# Patient Record
Sex: FEMALE | Race: WHITE | NOT HISPANIC OR LATINO | Employment: FULL TIME | URBAN - METROPOLITAN AREA
[De-identification: names, ages, dates, MRNs, and addresses within clinical notes are randomized per-mention and may not be internally consistent; named-entity substitution may affect disease eponyms.]

---

## 2017-01-27 ENCOUNTER — TELEPHONE (OUTPATIENT)
Dept: ORTHOPEDICS | Facility: CLINIC | Age: 52
End: 2017-01-27

## 2017-01-27 NOTE — TELEPHONE ENCOUNTER
Received faxed refill request from [facility] for tizanidine 4mg.           Prescription last written on 9/22/16  by Dr. De Luna  Last Fill Quantity: #30 with # refills: 0  Sig: take one tab nightly prn   Last Office Visit by provider: 8/26/16  Future Office Visit:   Visit date not found  Patient desires to have faxed or E-prescribe to pharmacy if available  Pharmacy selected in [facility].      Left message (consent to communicate on file) that we had received request for refill and are unable to fill due to appointment needed. Asked that she call to make an appointment to see Dr. De Luna. Pharmacy also informed.     GERRY Vega RN

## 2017-01-30 ENCOUNTER — TELEPHONE (OUTPATIENT)
Dept: ORTHOPEDICS | Facility: CLINIC | Age: 52
End: 2017-01-30

## 2017-01-30 DIAGNOSIS — G89.29 CHRONIC RIGHT-SIDED LOW BACK PAIN WITH RIGHT-SIDED SCIATICA: Primary | ICD-10-CM

## 2017-01-30 DIAGNOSIS — M54.41 CHRONIC RIGHT-SIDED LOW BACK PAIN WITH RIGHT-SIDED SCIATICA: Primary | ICD-10-CM

## 2017-01-30 NOTE — TELEPHONE ENCOUNTER
Received refill request from [facility] in [place].      Last refill request was in Sept. 2016 and last office visit was in 8/26/16    Last visit plan: PLAN:  1. Acetaminophen/Aleve/ice as needed for improved pain control.  2. Activity modification as discussed, including limitation of activities that cause pain/discomfort.  3. Initiate use of Tizanidine as prescribed at night as needed for improvement of pain/discomfort.  4. Formal physical therapy - exercises to include quadriceps/hamstring/hip adductor/gluteal strengthening/stretching along with pelvic stabilization exercises, restoration of postural and dynamic muscle imbalances, correction of gait abnormalities, and use of manual therapy/modalities as needed with home exercise prescription.  5. Follow-up after 4-6 weeks of physical therapy if not improving for further evaluation/medical care.  If symptoms resolve completely, can follow-up as needed.  Consider MRI of the lumbar spine without contrast, right sacroiliac joint corticosteroid injection, etc. as deemed appropriate moving forward.  Instructed to contact our office should the condition evolve or worsen, or should any new/progressive neurologic symptoms develop.    Please review and inform if you would like patient to follow up.     [place] ATC/THAI

## 2017-01-30 NOTE — TELEPHONE ENCOUNTER
Please call patient to obtain an update on symptoms to determine if needs to come in for an appointment for further evaluation of current medical state or if can obtain additional refill at this time.    Negro De Luna DO, CAQSM  [facility]

## 2017-01-31 NOTE — TELEPHONE ENCOUNTER
Called patient and LVM to have patient call back with the below information needed.    Location/radicular pain    Worse/better    Medications/treatments:

## 2017-02-08 NOTE — TELEPHONE ENCOUNTER
I called and LVM with a request to call us back with the information from the previous message.    Jeanmarie Latham, ATC

## 2017-02-09 NOTE — TELEPHONE ENCOUNTER
Called patient and LVM asking for a return call with an update on the information requested (Location/radicular pain, Worse/better, Medications/treatments)    Michell Kim M.Ed., ATC

## 2017-02-10 NOTE — TELEPHONE ENCOUNTER
Called patient, 3rd attempt, and LVM asking for a return call regarding status.  Michell Kim M.Ed., ATC

## 2017-02-14 NOTE — TELEPHONE ENCOUNTER
Multiple attempts were made to reach patient, she did not return calls to clinic.  Closing encounter at this time.

## 2017-03-01 DIAGNOSIS — N95.1 FEMALE CLIMACTERIC: ICD-10-CM

## 2017-03-01 RX ORDER — CITALOPRAM HYDROBROMIDE 40 MG/1
40 TABLET ORAL DAILY
Qty: 90 TABLET | Refills: 0 | Status: SHIPPED | OUTPATIENT
Start: 2017-03-01 | End: 2017-06-07

## 2017-03-01 NOTE — TELEPHONE ENCOUNTER
Citalopram      Last Written Prescription Date:  12/1/16  Last Fill Quantity: 90,   # refills: 0  Last Office Visit with [facility], [facility] or [facility] prescribing provider: 9/15/16  Future Office visit:   None scheduled    Routing refill request to provider for review/approval because:  Pt was given 90 day rx with instructions to make appt, none scheduled.  KARLA Boyle R.N.

## 2017-03-01 NOTE — TELEPHONE ENCOUNTER
She needs to make an appointment with Dr Reed for additional refills after this   3 months refill sent in

## 2017-06-07 ENCOUNTER — OFFICE VISIT (OUTPATIENT)
Dept: INTERNAL MEDICINE | Facility: CLINIC | Age: 52
End: 2017-06-07
Payer: COMMERCIAL

## 2017-06-07 VITALS
BODY MASS INDEX: 33.72 KG/M2 | OXYGEN SATURATION: 96 % | RESPIRATION RATE: 16 BRPM | TEMPERATURE: 98.4 F | DIASTOLIC BLOOD PRESSURE: 74 MMHG | HEART RATE: 79 BPM | WEIGHT: 209.8 LBS | SYSTOLIC BLOOD PRESSURE: 110 MMHG | HEIGHT: 66 IN

## 2017-06-07 DIAGNOSIS — E06.3 HYPOTHYROIDISM DUE TO HASHIMOTO'S THYROIDITIS: ICD-10-CM

## 2017-06-07 DIAGNOSIS — G47.9 SLEEP DISORDER: ICD-10-CM

## 2017-06-07 DIAGNOSIS — N95.1 FEMALE CLIMACTERIC: Primary | ICD-10-CM

## 2017-06-07 DIAGNOSIS — M54.30 SCIATIC LEG PAIN: ICD-10-CM

## 2017-06-07 LAB
ALBUMIN UR-MCNC: ABNORMAL MG/DL
ALT SERPL W P-5'-P-CCNC: 32 U/L (ref 0–50)
ANION GAP SERPL CALCULATED.3IONS-SCNC: 9 MMOL/L (ref 3–14)
APPEARANCE UR: ABNORMAL
BILIRUB UR QL STRIP: ABNORMAL
BUN SERPL-MCNC: 15 MG/DL (ref 7–30)
CALCIUM SERPL-MCNC: 9.1 MG/DL (ref 8.5–10.1)
CHLORIDE SERPL-SCNC: 109 MMOL/L (ref 94–109)
CHOLEST SERPL-MCNC: 219 MG/DL
CO2 SERPL-SCNC: 26 MMOL/L (ref 20–32)
COLOR UR AUTO: ABNORMAL
CREAT SERPL-MCNC: 0.67 MG/DL (ref 0.52–1.04)
ERYTHROCYTE [DISTWIDTH] IN BLOOD BY AUTOMATED COUNT: 14.8 % (ref 10–15)
GFR SERPL CREATININE-BSD FRML MDRD: ABNORMAL ML/MIN/1.7M2
GLUCOSE SERPL-MCNC: 113 MG/DL (ref 70–99)
GLUCOSE UR STRIP-MCNC: ABNORMAL MG/DL
HCT VFR BLD AUTO: 39 % (ref 35–47)
HDLC SERPL-MCNC: 33 MG/DL
HGB BLD-MCNC: 12.8 G/DL (ref 11.7–15.7)
HGB UR QL STRIP: ABNORMAL
KETONES UR STRIP-MCNC: ABNORMAL MG/DL
LDLC SERPL CALC-MCNC: 151 MG/DL
LEUKOCYTE ESTERASE UR QL STRIP: ABNORMAL
MCH RBC QN AUTO: 31.1 PG (ref 26.5–33)
MCHC RBC AUTO-ENTMCNC: 32.8 G/DL (ref 31.5–36.5)
MCV RBC AUTO: 95 FL (ref 78–100)
NITRATE UR QL: ABNORMAL
NONHDLC SERPL-MCNC: 186 MG/DL
PH UR STRIP: ABNORMAL PH (ref 5–7)
PLATELET # BLD AUTO: 336 10E9/L (ref 150–450)
POTASSIUM SERPL-SCNC: 4 MMOL/L (ref 3.4–5.3)
RBC # BLD AUTO: 4.11 10E12/L (ref 3.8–5.2)
SODIUM SERPL-SCNC: 144 MMOL/L (ref 133–144)
SP GR UR STRIP: ABNORMAL (ref 1–1.03)
TRIGL SERPL-MCNC: 177 MG/DL
TSH SERPL DL<=0.05 MIU/L-ACNC: 0.31 MU/L (ref 0.4–4)
URN SPEC COLLECT METH UR: ABNORMAL
UROBILINOGEN UR STRIP-ACNC: ABNORMAL EU/DL (ref 0.2–1)
WBC # BLD AUTO: 8.4 10E9/L (ref 4–11)

## 2017-06-07 PROCEDURE — 84460 ALANINE AMINO (ALT) (SGPT): CPT | Performed by: NURSE PRACTITIONER

## 2017-06-07 PROCEDURE — 84443 ASSAY THYROID STIM HORMONE: CPT | Performed by: NURSE PRACTITIONER

## 2017-06-07 PROCEDURE — 99396 PREV VISIT EST AGE 40-64: CPT | Performed by: NURSE PRACTITIONER

## 2017-06-07 PROCEDURE — 80061 LIPID PANEL: CPT | Performed by: NURSE PRACTITIONER

## 2017-06-07 PROCEDURE — 80048 BASIC METABOLIC PNL TOTAL CA: CPT | Performed by: NURSE PRACTITIONER

## 2017-06-07 PROCEDURE — 36415 COLL VENOUS BLD VENIPUNCTURE: CPT | Performed by: NURSE PRACTITIONER

## 2017-06-07 PROCEDURE — 85027 COMPLETE CBC AUTOMATED: CPT | Performed by: NURSE PRACTITIONER

## 2017-06-07 RX ORDER — GABAPENTIN 300 MG/1
600 CAPSULE ORAL 3 TIMES DAILY
Qty: 180 CAPSULE | Refills: 5 | Status: SHIPPED | OUTPATIENT
Start: 2017-06-07 | End: 2018-04-02

## 2017-06-07 RX ORDER — LEVOTHYROXINE SODIUM 150 UG/1
150 TABLET ORAL DAILY
Qty: 90 TABLET | Refills: 3 | Status: SHIPPED | OUTPATIENT
Start: 2017-06-07 | End: 2018-06-22

## 2017-06-07 RX ORDER — CITALOPRAM HYDROBROMIDE 40 MG/1
40 TABLET ORAL DAILY
Qty: 90 TABLET | Refills: 0 | Status: SHIPPED | OUTPATIENT
Start: 2017-06-07 | End: 2017-09-10

## 2017-06-07 RX ORDER — ZOLPIDEM TARTRATE 5 MG/1
5 TABLET ORAL
Qty: 30 TABLET | Refills: 1 | Status: SHIPPED | OUTPATIENT
Start: 2017-06-07 | End: 2018-05-09

## 2017-06-07 NOTE — MR AVS SNAPSHOT
After Visit Summary   6/7/2017    Arabella Hart    MRN: [id number]           Patient Information     Date Of Birth          1965        Visit Information        Provider Department      6/7/2017 7:40 AM Ladi López NP [facility]        Today's Diagnoses     Female climacteric    -  1    Hypothyroidism due to Hashimoto's thyroiditis        Sleep disorder          Care Instructions      Preventive Health Recommendations  Female Ages 50 - 64    Yearly exam: See your health care provider every year in order to  o Review health changes.   o Discuss preventive care.    o Review your medicines if your doctor has prescribed any.      Get a Pap test every three years (unless you have an abnormal result and your provider advises testing more often).    If you get Pap tests with HPV test, you only need to test every 5 years, unless you have an abnormal result.     You do not need a Pap test if your uterus was removed (hysterectomy) and you have not had cancer.    You should be tested each year for STDs (sexually transmitted diseases) if you're at risk.     Have a mammogram every 1 to 2 years.    Have a colonoscopy at age 50, or have a yearly FIT test (stool test). These exams screen for colon cancer.      Have a cholesterol test every 5 years, or more often if advised.    Have a diabetes test (fasting glucose) every three years. If you are at risk for diabetes, you should have this test more often.     If you are at risk for osteoporosis (brittle bone disease), think about having a bone density scan (DEXA).    Shots: Get a flu shot each year. Get a tetanus shot every 10 years.    Nutrition:     Eat at least 5 servings of fruits and vegetables each day.    Eat whole-grain bread, whole-wheat pasta and brown rice instead of white grains and rice.    Talk to your provider about Calcium and Vitamin D.     Lifestyle    Exercise at least 150 minutes a week (30 minutes a day, 5 days  a week). This will help you control your weight and prevent disease.    Limit alcohol to one drink per day.    No smoking.     Wear sunscreen to prevent skin cancer.     See your dentist every six months for an exam and cleaning.    See your eye doctor every 1 to 2 years.            Follow-ups after your visit        Additional Services     GASTROENTEROLOGY ADULT REF PROCEDURE ONLY       Last Lab Result: Creatinine (mg/dL)       Date                     Value                 01/29/2016               0.64             ----------  Body mass index is 33.86 kg/(m^2).     Needed:  No  Language:  English    Patient will be contacted to schedule procedure.     Please be aware that coverage of these services is subject to the terms and limitations of your health insurance plan.  Call member services at your health plan with any benefit or coverage questions.  Any procedures must be performed at a [facility] facility OR coordinated by your clinic's referral office.    Please bring the following with you to your appointment:    (1) Any X-Rays, CTs or MRIs which have been performed.  Contact the facility where they were done to arrange for  prior to your scheduled appointment.    (2) List of current medications   (3) This referral request   (4) Any documents/labs given to you for this referral                  Who to contact     If you have questions or need follow up information about today's clinic visit or your schedule please contact [facility] directly at [phone].  Normal or non-critical lab and imaging results will be communicated to you by MyChart, letter or phone within 4 business days after the clinic has received the results. If you do not hear from us within 7 days, please contact the clinic through MyChart or phone. If you have a critical or abnormal lab result, we will notify you by phone as soon as possible.  Submit refill requests through [employer] or call your pharmacy and  "they will forward the refill request to us. Please allow 3 business days for your refill to be completed.          Additional Information About Your Visit        [employer] Information     [employer] gives you secure access to your electronic health record. If you see a primary care provider, you can also send messages to your care team and make appointments. If you have questions, please call your primary care clinic.  If you do not have a primary care provider, please call [phone] and they will assist you.        Care EveryWhere ID     This is your Care EveryWhere ID. This could be used by other organizations to access your [facility] medical records  [id number]        Your Vitals Were     Pulse Temperature Respirations Height Last Period Pulse Oximetry    79 98.4  F (36.9  C) (Oral) 16 5' 6\" (1.676 m) 08/12/2012 96%    BMI (Body Mass Index)                   33.86 kg/m2            Blood Pressure from Last 3 Encounters:   06/07/17 110/74   09/15/16 110/80   08/26/16 118/68    Weight from Last 3 Encounters:   06/07/17 209 lb 12.8 oz (95.2 kg)   09/15/16 205 lb 9.6 oz (93.3 kg)   08/26/16 203 lb (92.1 kg)              We Performed the Following     ALT     Basic metabolic panel     CBC with platelets     GASTROENTEROLOGY ADULT REF PROCEDURE ONLY     Lipid Profile     TSH     UA reflex to Microscopic and Culture          Where to get your medicines      These medications were sent to [facility]/pharmacy #[id number] - [place], [place] - [address]  [address] [facility] [address]     Phone:  [phone]     citalopram 40 MG tablet    levothyroxine 150 MCG tablet         Some of these will need a paper prescription and others can be bought over the counter.  Ask your nurse if you have questions.     Bring a paper prescription for each of these medications     zolpidem 5 MG tablet          Primary Care Provider Office Phone # Fax #    ERNESTINA Serna [phone]       [facility] [address] [facility] [address]        Thank you!     Thank you for choosing [facility]  for your care. Our goal is always to provide you with excellent care. Hearing back from our patients is one way we can continue to improve our services. Please take a few minutes to complete the written survey that you may receive in the mail after your visit with us. Thank you!             Your Updated Medication List - Protect others around you: Learn how to safely use, store and throw away your medicines at www.disposemymeds.org.          This list is accurate as of: 6/7/17  8:37 AM.  Always use your most recent med list.                   Brand Name Dispense Instructions for use    ACETAMINOPHEN PO      Take 325-750 mg by mouth every 6 hours as needed.       ALLEGRA ALLERGY PO      Take  by mouth.       citalopram 40 MG tablet    celeXA    90 tablet    Take 1 tablet (40 mg) by mouth daily       docusate sodium 100 MG capsule    COLACE    100 capsule    Take 1 capsule by mouth 2 times daily as needed for constipation.       FIBERCON 625 MG tablet   Generic drug:  calcium polycarbophil     30    1 TABLET TWICE DAILY AS NEEDED       gabapentin 300 MG capsule    NEURONTIN    180 capsule    Take 2 capsules (600 mg) by mouth 3 times daily       ibuprofen 600 MG tablet    ADVIL/MOTRIN    30 tablet    Take 1 tablet (600 mg) by mouth every 6 hours as needed for pain (mild)       levothyroxine 150 MCG tablet    SYNTHROID/LEVOTHROID    90 tablet    Take 1 tablet (150 mcg) by mouth daily       nicotine 21 MG/24HR 24 hr patch    NICODERM CQ    30 patch    Place 1 patch onto the skin every 24 hours       zolpidem 5 MG tablet    AMBIEN    30 tablet    Take 1 tablet (5 mg) by mouth nightly as needed for sleep

## 2017-06-07 NOTE — TELEPHONE ENCOUNTER
Pt left voice message. She has appt with Ladi today and did not request refill for Gabapentin. Pt states her prescription will  next month and asks if refill can be sent.     Gabapentin 300mg      Last Written Prescription Date:  16  Last Fill Quantity: 180,   # refills: 5  Last Office Visit with [facility], [facility] or  Health prescribing provider: 17  Future Office visit:       Routing refill request to provider for review/approval because:  Drug not on the [facility], [facility] or  Health refill protocol or controlled substance

## 2017-06-07 NOTE — PROGRESS NOTES
SUBJECTIVE:     CC: Arabella Hart is an 52 year old woman who presents for preventive health visit.     Healthy Habits:  Do you get at least three servings of calcium containing foods daily (dairy, green leafy vegetables, etc.)? Answers for HPI/ROS submitted by the patient on 6/3/2017   Annual Exam:  Getting at least 3 servings of Calcium per day:: Yes  Bi-annual eye exam:: Yes  Dental care twice a year:: NO  Sleep apnea or symptoms of sleep apnea:: Daytime drowsiness, Excessive snoring  Diet:: Regular (no restrictions)  Frequency of exercise:: 4-5 days/week  Taking medications regularly:: Yes  Medication side effects:: None  Additional concerns today:: YES  PHQ-2 Score: 0  Duration of exercise:: 30-45 minutes        Amount of exercise or daily activities, outside of work: 4 day(s) per week    Problems taking medications regularly No    Medication side effects: No    Have you had an eye exam in the past two years? yes    Do you see a dentist twice per year? yes    Do you have sleep apnea, excessive snoring or daytime drowsiness?yes            Today's PHQ-2 Score:   PHQ-2 ( 1999 [employer]) 6/3/2017 3/3/2017   Q1: Little interest or pleasure in doing things 0 -   Q2: Feeling down, depressed or hopeless 0 -   PHQ-2 Score 0 -   Q1: Little interest or pleasure in doing things Not at all Not at all   Q2: Feeling down, depressed or hopeless Not at all Not at all   PHQ-2 Score 0 0       Abuse: Current or Past(Physical, Sexual or Emotional)- No  Do you feel safe in your environment - Yes    Social History   Substance Use Topics     Smoking status: Current Some Day Smoker     Packs/day: 0.50     Years: 15.00     Types: Cigarettes     Smokeless tobacco: Never Used     Alcohol use Yes      Comment: 1-2 drinks seldom     The patient does not drink >3 drinks per day nor >7 drinks per week.    Recent Labs   Lab Test  01/29/16   0834  06/20/14   0938  05/30/13   0916   CHOL  201*  168  187   HDL  37*  35*  45*   LDL  123*   104  119   TRIG  207*  145  112   CHOLHDLRATIO   --   4.9  4.1   NHDL  164*   --    --        Reviewed orders with patient.  Reviewed health maintenance and updated orders accordingly - Yes    Mammo Decision Support:  Patient over age 50, mutual decision to screen reflected in health maintenance.    Pertinent mammograms are reviewed under the imaging tab.  History of abnormal Pap smear: Status post hysterectomy. Pap still indicated. no    Reviewed and updated as needed this visit by clinical staff  Tobacco  Allergies  Meds  Med Hx  Surg Hx  Fam Hx  Soc Hx        Reviewed and updated as needed this visit by Provider            ROS:  C: NEGATIVE for fever, chills, change in weight  ENT: NEGATIVE for ear, mouth and throat problems  R: NEGATIVE for significant cough or SOB  CV: NEGATIVE for chest pain, palpitations or peripheral edema  GI: NEGATIVE for nausea, abdominal pain, heartburn, or change in bowel habits  M: NEGATIVE for significant arthralgias or myalgia  N: NEGATIVE for weakness, dizziness or paresthesias  P: NEGATIVE for changes in mood or affect     BP Readings from Last 3 Encounters:   06/07/17 110/74   09/15/16 110/80   08/26/16 118/68    Wt Readings from Last 3 Encounters:   06/07/17 209 lb 12.8 oz (95.2 kg)   09/15/16 205 lb 9.6 oz (93.3 kg)   08/26/16 203 lb (92.1 kg)                  Patient Active Problem List   Diagnosis     Acute reaction to stress     Allergic rhinitis     CARDIOVASCULAR SCREENING; LDL GOAL LESS THAN 160     Uric acid kidney stones     Menorrhagia     ASCUS on Pap smear     Sleep disturbances     S/P hysterectomy     RUQ abdominal pain     Calculus of gallbladder with acute on chronic cholecystitis without obstruction     History of Hashimoto thyroiditis     Hypothyroidism due to Hashimoto's thyroiditis     Irritable bowel syndrome without diarrhea     NAGI (generalized anxiety disorder)     Tobacco dependence syndrome     Past Surgical History:   Procedure Laterality Date      C LIGATE FALLOPIAN TUBE,POSTPARTUM      Tubal Ligation     C NONSPECIFIC PROCEDURE      Right Ankle Surgery     LAPAROSCOPIC CHOLECYSTECTOMY N/A 2015    Procedure: LAPAROSCOPIC CHOLECYSTECTOMY;  Surgeon: Maria Fernanda Regalado MD;  Location: [facility] OR     LAPAROSCOPIC HYSTERECTOMY TOTAL, BILATERAL SALPINGO-OOPHORECTOMY, COMBINED  2012    Procedure: COMBINED LAPAROSCOPIC HYSTERECTOMY TOTAL, SALPINGO-OOPHORECTOMY;  LAPAROSCOPIC HYSTERECTOMY TOTAL, Bilateral SALPINGO-OOPHORECTOMY, pelvic exam under anesthesia;  Surgeon: Jolene Baez MD;  Location: [facility] OR       Social History   Substance Use Topics     Smoking status: Current Some Day Smoker     Packs/day: 0.50     Years: 15.00     Types: Cigarettes     Smokeless tobacco: Never Used     Alcohol use Yes      Comment: 1-2 drinks seldom     Family History   Problem Relation Age of Onset     Breast Cancer Paternal Grandmother      Thyroid Disease Mother      Hypothyroid     CANCER Mother      Ovarian - age 61     Coronary Artery Disease Paternal Grandfather      Hypertension Father      Heart complication after MI.       CANCER Father      Throat (Epiglottis) smoker     HEART DISEASE Father       of heart attack     Coronary Artery Disease Father      Breast Cancer Paternal Aunt      and P great aunt     Breast Cancer Paternal Aunt      Coronary Artery Disease Brother          Current Outpatient Prescriptions   Medication Sig Dispense Refill     zolpidem (AMBIEN) 5 MG tablet Take 1 tablet (5 mg) by mouth nightly as needed for sleep 30 tablet 1     citalopram (CELEXA) 40 MG tablet Take 1 tablet (40 mg) by mouth daily 90 tablet 0     levothyroxine (SYNTHROID/LEVOTHROID) 150 MCG tablet Take 1 tablet (150 mcg) by mouth daily 90 tablet 3     gabapentin (NEURONTIN) 300 MG capsule Take 2 capsules (600 mg) by mouth 3 times daily 180 capsule 5     nicotine (NICODERM CQ) 21 MG/24HR patch 2h hr Place 1 patch onto the skin every 24 hours 30 patch  "2     ibuprofen (ADVIL,MOTRIN) 600 MG tablet Take 1 tablet (600 mg) by mouth every 6 hours as needed for pain (mild) 30 tablet 0     docusate sodium (COLACE) 100 MG capsule Take 1 capsule by mouth 2 times daily as needed for constipation. 100 capsule 0     Fexofenadine HCl (ALLEGRA ALLERGY PO) Take  by mouth.         ACETAMINOPHEN PO Take 325-750 mg by mouth every 6 hours as needed.         FIBERCON 625 MG OR TABS 1 TABLET TWICE DAILY AS NEEDED 30 1     [DISCONTINUED] citalopram (CELEXA) 40 MG tablet Take 1 tablet (40 mg) by mouth daily 90 tablet 0     [DISCONTINUED] levothyroxine (SYNTHROID, LEVOTHROID) 150 MCG tablet Take 1 tablet (150 mcg) by mouth daily 90 tablet 3     OBJECTIVE:     /74 (BP Location: Right arm, Patient Position: Chair, Cuff Size: Adult Large)  Pulse 79  Temp 98.4  F (36.9  C) (Oral)  Resp 16  Ht 5' 6\" (1.676 m)  Wt 209 lb 12.8 oz (95.2 kg)  LMP 08/12/2012  SpO2 96%  BMI 33.86 kg/m2  EXAM:  GENERAL: alert, no distress and obese  NECK: no adenopathy, no asymmetry, masses, or scars and thyroid normal to palpation  RESP: lungs clear to auscultation - no rales, rhonchi or wheezes  CV: regular rate and rhythm, normal S1 S2, no S3 or S4, no murmur, click or rub, no peripheral edema and peripheral pulses strong  ABDOMEN: soft, nontender, no hepatosplenomegaly, no masses and bowel sounds normal  MS: no gross musculoskeletal defects noted, no edema  PSYCH: mentation appears normal, affect normal/bright    ASSESSMENT/PLAN:         ICD-10-CM    1. Health care maintenance Z00.00 CBC with platelets     Basic metabolic panel     Lipid Profile     ALT     GASTROENTEROLOGY ADULT REF PROCEDURE ONLY     UA reflex to Microscopic and Culture     CANCELED: Pap imaged thin layer screen with HPV - recommended age 30 - 65 years (select HPV order below)   2. Insomnia G47.00    3. Female climacteric N95.1 citalopram (CELEXA) 40 MG tablet   4. Hypothyroidism due to Hashimoto's thyroiditis E03.8 levothyroxine " "(SYNTHROID/LEVOTHROID) 150 MCG tablet    E06.3 TSH   5. Sleep disorder G47.9 zolpidem (AMBIEN) 5 MG tablet       COUNSELING:   Reviewed preventive health counseling, as reflected in patient instructions         reports that she has been smoking Cigarettes.  She has a 7.50 pack-year smoking history. She has never used smokeless tobacco.  Tobacco Cessation Action Plan: Information offered: Patient not interested at this time  Estimated body mass index is 33.86 kg/(m^2) as calculated from the following:    Height as of this encounter: 5' 6\" (1.676 m).    Weight as of this encounter: 209 lb 12.8 oz (95.2 kg).   Weight management plan: Discussed healthy diet and exercise guidelines and patient will follow up in 6 months in clinic to re-evaluate.    Counseling Resources:  ATP IV Guidelines  Pooled Cohorts Equation Calculator  Breast Cancer Risk Calculator  FRAX Risk Assessment  ICSI Preventive Guidelines  Dietary Guidelines for Americans, 2010  USDA's MyPlate  ASA Prophylaxis  Lung CA Screening    Ladi López NP  [facility]  "

## 2017-06-07 NOTE — NURSING NOTE
"Chief Complaint   Patient presents with     Physical     needs pap,fasting       Initial /74 (BP Location: Right arm, Patient Position: Chair, Cuff Size: Adult Large)  Pulse 79  Temp 98.4  F (36.9  C) (Oral)  Resp 16  Ht 5' 6\" (1.676 m)  Wt 209 lb 12.8 oz (95.2 kg)  LMP 08/12/2012  SpO2 96%  BMI 33.86 kg/m2 Estimated body mass index is 33.86 kg/(m^2) as calculated from the following:    Height as of this encounter: 5' 6\" (1.676 m).    Weight as of this encounter: 209 lb 12.8 oz (95.2 kg).  Medication Reconciliation: complete    "

## 2017-06-08 ASSESSMENT — PATIENT HEALTH QUESTIONNAIRE - PHQ9: SUM OF ALL RESPONSES TO PHQ QUESTIONS 1-9: 4

## 2017-06-27 ENCOUNTER — TELEPHONE (OUTPATIENT)
Dept: GASTROENTEROLOGY | Facility: CLINIC | Age: 52
End: 2017-06-27

## 2017-06-27 NOTE — TELEPHONE ENCOUNTER
Third attempt to reach patient to schedule Colonoscopy. Not Scheduled at [facility]. Left Messages

## 2017-09-10 DIAGNOSIS — N95.1 FEMALE CLIMACTERIC: ICD-10-CM

## 2017-09-11 RX ORDER — CITALOPRAM HYDROBROMIDE 40 MG/1
TABLET ORAL
Qty: 90 TABLET | Refills: 0 | Status: SHIPPED | OUTPATIENT
Start: 2017-09-11 | End: 2017-12-15

## 2017-09-11 NOTE — TELEPHONE ENCOUNTER
Citalopram     Last Written Prescription Date: 06/07/17  Last Fill Quantity: 90, # refills: 0  [place] Office Visit with [facility] [place] provider:  06/07/17 López        Last PHQ-9 score on record=   PHQ-9 SCORE 6/7/2017   Total Score -   Total Score 4

## 2017-12-15 DIAGNOSIS — N95.1 FEMALE CLIMACTERIC: ICD-10-CM

## 2017-12-18 RX ORDER — CITALOPRAM HYDROBROMIDE 40 MG/1
TABLET ORAL
Qty: 90 TABLET | Refills: 1 | Status: SHIPPED | OUTPATIENT
Start: 2017-12-18 | End: 2018-06-22

## 2017-12-18 NOTE — TELEPHONE ENCOUNTER
Requested Prescriptions   Signed Prescriptions Disp Refills     citalopram (CELEXA) 40 MG tablet 90 tablet 1     Sig: TAKE 1 TABLET (40 MG) BY MOUTH DAILY    SSRIs Protocol Passed    12/15/2017  3:32 AM       Passed - Recent or future visit with authorizing provider    Patient had office visit in the last year or has a visit in the next 30 days with authorizing provider.  See chart review.              Passed - Medication is NOT Bupropion    If the medication is Bupropion (Wellbutrin), and the patient is taking for smoking cessation; OK to refill.         Passed - Patient is age 18 or older       Passed - No active pregnancy on record       Passed - No positive pregnancy test in last 12 months        Prescription approved per [facility] Refill Protocol.

## 2018-01-30 ENCOUNTER — TELEPHONE (OUTPATIENT)
Dept: INTERNAL MEDICINE | Facility: CLINIC | Age: 53
End: 2018-01-30

## 2018-01-30 NOTE — TELEPHONE ENCOUNTER
1/30/2018    Call Regarding Preventive Health Screening Colonoscopy and Mammogram    Attempt 3    Message on voicemail    Comments:       Outreach   Belkis Huitron

## 2018-04-02 DIAGNOSIS — M54.30 SCIATIC LEG PAIN: ICD-10-CM

## 2018-04-02 RX ORDER — GABAPENTIN 300 MG/1
CAPSULE ORAL
Qty: 180 CAPSULE | Refills: 2 | Status: SHIPPED | OUTPATIENT
Start: 2018-04-02 | End: 2018-05-26

## 2018-05-09 ENCOUNTER — OFFICE VISIT (OUTPATIENT)
Dept: FAMILY MEDICINE | Facility: CLINIC | Age: 53
End: 2018-05-09
Payer: COMMERCIAL

## 2018-05-09 VITALS
DIASTOLIC BLOOD PRESSURE: 88 MMHG | SYSTOLIC BLOOD PRESSURE: 130 MMHG | HEART RATE: 76 BPM | HEIGHT: 65 IN | BODY MASS INDEX: 35.49 KG/M2 | WEIGHT: 213 LBS | TEMPERATURE: 98.3 F

## 2018-05-09 DIAGNOSIS — R59.0 CERVICAL LYMPHADENOPATHY: Primary | ICD-10-CM

## 2018-05-09 DIAGNOSIS — Z12.31 ENCOUNTER FOR SCREENING MAMMOGRAM FOR BREAST CANCER: ICD-10-CM

## 2018-05-09 DIAGNOSIS — Z12.11 SPECIAL SCREENING FOR MALIGNANT NEOPLASMS, COLON: ICD-10-CM

## 2018-05-09 DIAGNOSIS — F17.200 TOBACCO DEPENDENCE SYNDROME: ICD-10-CM

## 2018-05-09 DIAGNOSIS — E06.3 HYPOTHYROIDISM DUE TO HASHIMOTO'S THYROIDITIS: ICD-10-CM

## 2018-05-09 DIAGNOSIS — R03.0 ELEVATED BLOOD PRESSURE READING WITHOUT DIAGNOSIS OF HYPERTENSION: ICD-10-CM

## 2018-05-09 PROBLEM — N95.1 MENOPAUSAL FLUSHING: Status: ACTIVE | Noted: 2018-05-09

## 2018-05-09 LAB
BASOPHILS # BLD AUTO: 0.1 10E9/L (ref 0–0.2)
BASOPHILS NFR BLD AUTO: 0.8 %
DIFFERENTIAL METHOD BLD: NORMAL
EOSINOPHIL # BLD AUTO: 0.4 10E9/L (ref 0–0.7)
EOSINOPHIL NFR BLD AUTO: 4.3 %
ERYTHROCYTE [DISTWIDTH] IN BLOOD BY AUTOMATED COUNT: 13.9 % (ref 10–15)
ERYTHROCYTE [SEDIMENTATION RATE] IN BLOOD BY WESTERGREN METHOD: 7 MM/H (ref 0–30)
HCT VFR BLD AUTO: 42.3 % (ref 35–47)
HGB BLD-MCNC: 14 G/DL (ref 11.7–15.7)
LYMPHOCYTES # BLD AUTO: 2 10E9/L (ref 0.8–5.3)
LYMPHOCYTES NFR BLD AUTO: 20.5 %
MCH RBC QN AUTO: 30.7 PG (ref 26.5–33)
MCHC RBC AUTO-ENTMCNC: 33.1 G/DL (ref 31.5–36.5)
MCV RBC AUTO: 93 FL (ref 78–100)
MONOCYTES # BLD AUTO: 0.6 10E9/L (ref 0–1.3)
MONOCYTES NFR BLD AUTO: 5.7 %
NEUTROPHILS # BLD AUTO: 6.8 10E9/L (ref 1.6–8.3)
NEUTROPHILS NFR BLD AUTO: 68.7 %
PLATELET # BLD AUTO: 375 10E9/L (ref 150–450)
RBC # BLD AUTO: 4.56 10E12/L (ref 3.8–5.2)
WBC # BLD AUTO: 10 10E9/L (ref 4–11)

## 2018-05-09 PROCEDURE — 36415 COLL VENOUS BLD VENIPUNCTURE: CPT | Performed by: FAMILY MEDICINE

## 2018-05-09 PROCEDURE — 84443 ASSAY THYROID STIM HORMONE: CPT | Performed by: FAMILY MEDICINE

## 2018-05-09 PROCEDURE — 86140 C-REACTIVE PROTEIN: CPT | Performed by: FAMILY MEDICINE

## 2018-05-09 PROCEDURE — 85652 RBC SED RATE AUTOMATED: CPT | Performed by: FAMILY MEDICINE

## 2018-05-09 PROCEDURE — 85025 COMPLETE CBC W/AUTO DIFF WBC: CPT | Performed by: FAMILY MEDICINE

## 2018-05-09 PROCEDURE — 99214 OFFICE O/P EST MOD 30 MIN: CPT | Performed by: FAMILY MEDICINE

## 2018-05-09 ASSESSMENT — ANXIETY QUESTIONNAIRES
3. WORRYING TOO MUCH ABOUT DIFFERENT THINGS: NOT AT ALL
GAD7 TOTAL SCORE: 0
2. NOT BEING ABLE TO STOP OR CONTROL WORRYING: NOT AT ALL
5. BEING SO RESTLESS THAT IT IS HARD TO SIT STILL: NOT AT ALL
1. FEELING NERVOUS, ANXIOUS, OR ON EDGE: NOT AT ALL
7. FEELING AFRAID AS IF SOMETHING AWFUL MIGHT HAPPEN: NOT AT ALL
6. BECOMING EASILY ANNOYED OR IRRITABLE: NOT AT ALL
IF YOU CHECKED OFF ANY PROBLEMS ON THIS QUESTIONNAIRE, HOW DIFFICULT HAVE THESE PROBLEMS MADE IT FOR YOU TO DO YOUR WORK, TAKE CARE OF THINGS AT HOME, OR GET ALONG WITH OTHER PEOPLE: NOT DIFFICULT AT ALL

## 2018-05-09 ASSESSMENT — PATIENT HEALTH QUESTIONNAIRE - PHQ9: 5. POOR APPETITE OR OVEREATING: NOT AT ALL

## 2018-05-09 NOTE — MR AVS SNAPSHOT
After Visit Summary   5/9/2018    Arabella Hart    MRN: [id number]           Patient Information     Date Of Birth          1965        Visit Information        Provider Department      5/9/2018 10:40 AM Ely Dozier MD [facility]        Today's Diagnoses     Cervical lymphadenopathy    -  1    Hypothyroidism due to Hashimoto's thyroiditis        Encounter for screening mammogram for breast cancer        Special screening for malignant neoplasms, colon        Tobacco dependence syndrome        Elevated blood pressure reading without diagnosis of hypertension           Follow-ups after your visit        Additional Services     GASTROENTEROLOGY ADULT REF PROCEDURE ONLY Hunter Portillo [phone]; [facility]       Last Lab Result: Creatinine (mg/dL)       Date                     Value                 06/07/2017               0.67             ----------  There is no height or weight on file to calculate BMI.     Needed:  No  Language:  English    Patient will be contacted to schedule procedure.     Please be aware that coverage of these services is subject to the terms and limitations of your health insurance plan.  Call member services at your health plan with any benefit or coverage questions.  Any procedures must be performed at a [place] facility OR coordinated by your clinic's referral office.    Please bring the following with you to your appointment:    (1) Any X-Rays, CTs or MRIs which have been performed.  Contact the facility where they were done to arrange for  prior to your scheduled appointment.    (2) List of current medications   (3) This referral request   (4) Any documents/labs given to you for this referral                  Follow-up notes from your care team     Return in about 2 months (around 7/9/2018) for Yearly Physical Exam.      Future tests that were ordered for you today     Open Future Orders        Priority Expected  "Expires Ordered    US Head Neck Soft Tissue Routine  5/9/2019 5/9/2018    *MA Screening Digital Bilateral Routine  5/9/2019 5/9/2018            Who to contact     If you have questions or need follow up information about today's clinic visit or your schedule please contact [facility] directly at [phone].  Normal or non-critical lab and imaging results will be communicated to you by MyChart, letter or phone within 4 business days after the clinic has received the results. If you do not hear from us within 7 days, please contact the clinic through [employer] or phone. If you have a critical or abnormal lab result, we will notify you by phone as soon as possible.  Submit refill requests through [employer] or call your pharmacy and they will forward the refill request to us. Please allow 3 business days for your refill to be completed.          Additional Information About Your Visit        [employer] Information     [employer] gives you secure access to your electronic health record. If you see a primary care provider, you can also send messages to your care team and make appointments. If you have questions, please call your primary care clinic.  If you do not have a primary care provider, please call [phone] and they will assist you.        Care EveryWhere ID     This is your Care EveryWhere ID. This could be used by other organizations to access your [facility] medical records  [id number]        Your Vitals Were     Pulse Temperature Height Last Period Breastfeeding? BMI (Body Mass Index)    76 98.3  F (36.8  C) (Oral) 5' 4.75\" (1.645 m) 08/12/2012 No 35.72 kg/m2       Blood Pressure from Last 3 Encounters:   05/09/18 130/88   06/07/17 110/74   09/15/16 110/80    Weight from Last 3 Encounters:   05/09/18 213 lb (96.6 kg)   06/07/17 209 lb 12.8 oz (95.2 kg)   09/15/16 205 lb 9.6 oz (93.3 kg)              We Performed the Following     CBC with platelets and differential     CRP, inflammation     ESR: " Erythrocyte sedimentation rate     GASTROENTEROLOGY ADULT REF PROCEDURE ONLY Hunter Portillo [phone]; [facility]     TSH with free T4 reflex          Today's Medication Changes          These changes are accurate as of 5/9/18 11:33 AM.  If you have any questions, ask your nurse or doctor.               Stop taking these medicines if you haven't already. Please contact your care team if you have questions.     docusate sodium 100 MG capsule   Commonly known as:  COLACE   Stopped by:  Ely Dozier MD           FIBERCON 625 MG tablet   Generic drug:  calcium polycarbophil   Stopped by:  Ely Dozier MD           ibuprofen 600 MG tablet   Commonly known as:  ADVIL/MOTRIN   Stopped by:  Ely Dozier MD           nicotine 21 MG/24HR 24 hr patch   Commonly known as:  NICODERM CQ   Stopped by:  Ely Dozier MD           zolpidem 5 MG tablet   Commonly known as:  AMBIEN   Stopped by:  Ely Dozier MD                    Primary Care Provider Office Phone # Fax #    Ely Dozier [phone] [id number] [facility] [address]        Equal Access to Services     [facility] AH: Hadii aad ku hadasho Soomaali, waaxda luqadaha, qaybta kaalmada adeegyada, waxay idiin hayaan adeeg kharash la'aan ah. So [facility] [phone].    ATENCIÓN: Si habla español, tiene a parikh disposición servicios gratuitos de asistencia lingüística. Llame al [phone].    We comply with applicable federal civil rights laws and [place] laws. We do not discriminate on the basis of race, color, national origin, age, disability, sex, sexual orientation, or gender identity.            Thank you!     Thank you for choosing [facility]  for your care. Our goal is always to provide you with excellent care. Hearing back from our patients is one way we can continue to improve our services. Please take a few minutes to complete the written survey that you may receive in  the mail after your visit with us. Thank you!             Your Updated Medication List - Protect others around you: Learn how to safely use, store and throw away your medicines at www.disposemymeds.org.          This list is accurate as of 5/9/18 11:33 AM.  Always use your most recent med list.                   Brand Name Dispense Instructions for use Diagnosis    ACETAMINOPHEN PO      Take 325-750 mg by mouth every 6 hours as needed.        ALLEGRA ALLERGY PO      Take  by mouth.        citalopram 40 MG tablet    celeXA    90 tablet    TAKE 1 TABLET (40 MG) BY MOUTH DAILY    Female climacteric       gabapentin 300 MG capsule    NEURONTIN    180 capsule    TAKE 2 CAPSULES (600 MG) BY MOUTH 3 TIMES DAILY    Sciatic leg pain       levothyroxine 150 MCG tablet    SYNTHROID/LEVOTHROID    90 tablet    Take 1 tablet (150 mcg) by mouth daily    Hypothyroidism due to Hashimoto's thyroiditis

## 2018-05-09 NOTE — PROGRESS NOTES
SUBJECTIVE:   Arabella Hart is a 52 year old female who presents to clinic today for the following health issues:      Noticed left side neck mass last night. Hasn't noticed any pain or fullness in the area previously. Some pain to touch.     No history of similar.     Having trouble with allergy symptoms - ears plugging, nasal congestion.     No fevers.   Smoker.     Hx of Hashimoto's.   Dad with throat cancer - was a smoker.         Problem list and histories reviewed & adjusted, as indicated.  Additional history: none    Patient Active Problem List   Diagnosis     Allergic rhinitis     Uric acid kidney stones     ASCUS on Pap smear     S/P hysterectomy     Hypothyroidism due to Hashimoto's thyroiditis     Irritable bowel syndrome without diarrhea     NAGI (generalized anxiety disorder)     Tobacco dependence syndrome     Past Surgical History:   Procedure Laterality Date     C LIGATE FALLOPIAN TUBE,POSTPARTUM  1998    Tubal Ligation     C NONSPECIFIC PROCEDURE  1983    Right Ankle Surgery     LAPAROSCOPIC CHOLECYSTECTOMY N/A 6/4/2015    Procedure: LAPAROSCOPIC CHOLECYSTECTOMY;  Surgeon: Maria Fernanda Regalado MD;  Location: [facility] OR     LAPAROSCOPIC HYSTERECTOMY TOTAL, BILATERAL SALPINGO-OOPHORECTOMY, COMBINED  8/14/2012    Procedure: COMBINED LAPAROSCOPIC HYSTERECTOMY TOTAL, SALPINGO-OOPHORECTOMY;  LAPAROSCOPIC HYSTERECTOMY TOTAL, Bilateral SALPINGO-OOPHORECTOMY, pelvic exam under anesthesia;  Surgeon: Jolene Baez MD;  Location: [facility] OR       Social History   Substance Use Topics     Smoking status: Current Some Day Smoker     Packs/day: 0.50     Years: 15.00     Types: Cigarettes     Smokeless tobacco: Never Used     Alcohol use Yes      Comment: 1-2 drinks seldom     Family History   Problem Relation Age of Onset     Breast Cancer Paternal Grandmother      Thyroid Disease Mother      Hypothyroid     CANCER Mother      Ovarian - age 61     Coronary Artery Disease Paternal Grandfather       "Hypertension Father      Heart complication after MI.       CANCER Father      Throat (Epiglottis) smoker     HEART DISEASE Father       of heart attack     Coronary Artery Disease Father      Breast Cancer Paternal Aunt      and P great aunt     Breast Cancer Paternal Aunt      Coronary Artery Disease Brother            Reviewed and updated as needed this visit by clinical staff  Allergies       Reviewed and updated as needed this visit by Provider         ROS:  Constitutional, HEENT, cardiovascular, pulmonary, gi and gu systems are negative, except as otherwise noted.    OBJECTIVE:     /88 (BP Location: Right arm, Patient Position: Sitting, Cuff Size: Adult Large)  Pulse 76  Temp 98.3  F (36.8  C) (Oral)  Ht 5' 4.75\" (1.645 m)  Wt 213 lb (96.6 kg)  LMP 2012  Breastfeeding? No  BMI 35.72 kg/m2  Body mass index is 35.72 kg/(m^2).  GENERAL: healthy, alert and no distress  HENT: serous effusion bilaterally, clear rhinorrhea, boggy nasal turbinates  NECK: 2 cm solitary submandibular mass, feels fluid filled, no other cervical LAD   RESP: lungs clear to auscultation - no rales, rhonchi or wheezes  CV: regular rate and rhythm, normal S1 S2, no S3 or S4, no murmur    Diagnostic Test Results:  none     ASSESSMENT/PLAN:     1. Cervical lymphadenopathy - would expect lymphadenitis to be more painful. Possibly LAD from allergies, cannot rule out cancer as she is a smoker, palpates as a cyst - need more information - lab work today with ultrasound in near future.   - CBC with platelets and differential  - ESR: Erythrocyte sedimentation rate  - CRP, inflammation  - US Head Neck Soft Tissue; Future    2. Hypothyroidism due to Hashimoto's thyroiditis - will check levels today  - TSH with free T4 reflex    3. Encounter for screening mammogram for breast cancer  - *MA Screening Digital Bilateral; Future    4. Special screening for malignant neoplasms, colon  - GASTROENTEROLOGY ADULT REF PROCEDURE " ONLY [facility] Lindsey [phone]; [facility]    5. Tobacco dependence syndrome - precontemplative    6. Elevated blood pressure reading without diagnosis of hypertension - will continue to monitor, may just be situational      Ely Dozier MD  [facility]

## 2018-05-09 NOTE — NURSING NOTE
"  Chief Complaint   Patient presents with     Edema       Initial /88 (BP Location: Right arm, Patient Position: Sitting, Cuff Size: Adult Large)  Pulse 76  Temp 98.3  F (36.8  C) (Oral)  Ht 5' 4.75\" (1.645 m)  Wt 213 lb (96.6 kg)  LMP 08/12/2012  Breastfeeding? No  BMI 35.72 kg/m2 Estimated body mass index is 35.72 kg/(m^2) as calculated from the following:    Height as of this encounter: 5' 4.75\" (1.645 m).    Weight as of this encounter: 213 lb (96.6 kg).  Medication Reconciliation: complete      Health Maintenance addressed:  All orders pending to sign    Saeid Barrios CMA          "

## 2018-05-10 ENCOUNTER — HOSPITAL ENCOUNTER (OUTPATIENT)
Dept: ULTRASOUND IMAGING | Facility: CLINIC | Age: 53
Discharge: HOME | End: 2018-05-10
Attending: FAMILY MEDICINE | Admitting: FAMILY MEDICINE
Payer: COMMERCIAL

## 2018-05-10 DIAGNOSIS — R59.0 CERVICAL LYMPHADENOPATHY: ICD-10-CM

## 2018-05-10 DIAGNOSIS — K11.8 PAROTID MASS: Primary | ICD-10-CM

## 2018-05-10 LAB
CRP SERPL-MCNC: 10 MG/L (ref 0–8)
TSH SERPL DL<=0.005 MIU/L-ACNC: 1.18 MU/L (ref 0.4–4)

## 2018-05-10 PROCEDURE — 76536 US EXAM OF HEAD AND NECK: CPT

## 2018-05-10 ASSESSMENT — PATIENT HEALTH QUESTIONNAIRE - PHQ9: SUM OF ALL RESPONSES TO PHQ QUESTIONS 1-9: 4

## 2018-05-10 ASSESSMENT — ANXIETY QUESTIONNAIRES: GAD7 TOTAL SCORE: 0

## 2018-05-14 ENCOUNTER — MYC MEDICAL ADVICE (OUTPATIENT)
Dept: FAMILY MEDICINE | Facility: CLINIC | Age: 53
End: 2018-05-14

## 2018-05-16 ENCOUNTER — TRANSFERRED RECORDS (OUTPATIENT)
Dept: HEALTH INFORMATION MANAGEMENT | Facility: CLINIC | Age: 53
End: 2018-05-16

## 2018-05-26 DIAGNOSIS — M54.30 SCIATIC LEG PAIN: ICD-10-CM

## 2018-05-29 RX ORDER — GABAPENTIN 300 MG/1
CAPSULE ORAL
Qty: 180 CAPSULE | Refills: 0 | Status: SHIPPED | OUTPATIENT
Start: 2018-05-29 | End: 2018-07-23

## 2018-06-11 NOTE — TELEPHONE ENCOUNTER
Spoke with pt and informed her she will need to stay within the [facility].  [facility] is out of [facility].     Yareli-Referrals

## 2018-06-12 NOTE — TELEPHONE ENCOUNTER
Yareli    Can you look at most recent response from patient about [facility].  Unsure if we have to do anything  Graeme Whittington RN, BSN

## 2018-06-12 NOTE — TELEPHONE ENCOUNTER
Spoke with ENT and they are unable to perform this procedure at [facility].  Pt will have surgery at [facility]. Referral has been approved and facilitated to pt's insurance.  LM for pt to call Yareli back.    Yareli-Referrals

## 2018-06-22 ENCOUNTER — OFFICE VISIT (OUTPATIENT)
Dept: FAMILY MEDICINE | Facility: CLINIC | Age: 53
End: 2018-06-22
Payer: COMMERCIAL

## 2018-06-22 VITALS
SYSTOLIC BLOOD PRESSURE: 118 MMHG | HEART RATE: 81 BPM | TEMPERATURE: 98.7 F | HEIGHT: 65 IN | WEIGHT: 207 LBS | DIASTOLIC BLOOD PRESSURE: 76 MMHG | BODY MASS INDEX: 34.49 KG/M2

## 2018-06-22 DIAGNOSIS — Z01.818 PREOP GENERAL PHYSICAL EXAM: Primary | ICD-10-CM

## 2018-06-22 DIAGNOSIS — Z12.31 ENCOUNTER FOR SCREENING MAMMOGRAM FOR BREAST CANCER: ICD-10-CM

## 2018-06-22 DIAGNOSIS — F17.200 TOBACCO USE DISORDER: ICD-10-CM

## 2018-06-22 DIAGNOSIS — F41.1 GAD (GENERALIZED ANXIETY DISORDER): ICD-10-CM

## 2018-06-22 DIAGNOSIS — K11.8 PAROTID MASS: ICD-10-CM

## 2018-06-22 DIAGNOSIS — E06.3 HYPOTHYROIDISM DUE TO HASHIMOTO'S THYROIDITIS: ICD-10-CM

## 2018-06-22 DIAGNOSIS — N95.1 FEMALE CLIMACTERIC: ICD-10-CM

## 2018-06-22 PROBLEM — R03.0 ELEVATED BLOOD PRESSURE READING WITHOUT DIAGNOSIS OF HYPERTENSION: Status: RESOLVED | Noted: 2018-05-09 | Resolved: 2018-06-22

## 2018-06-22 PROCEDURE — 93000 ELECTROCARDIOGRAM COMPLETE: CPT | Performed by: FAMILY MEDICINE

## 2018-06-22 PROCEDURE — 99214 OFFICE O/P EST MOD 30 MIN: CPT | Performed by: FAMILY MEDICINE

## 2018-06-22 RX ORDER — CITALOPRAM HYDROBROMIDE 40 MG/1
TABLET ORAL
Qty: 90 TABLET | Refills: 3 | Status: SHIPPED | OUTPATIENT
Start: 2018-06-22 | End: 2019-06-10

## 2018-06-22 RX ORDER — PSEUDOEPHEDRINE HCL 120 MG/1
120 TABLET, FILM COATED, EXTENDED RELEASE ORAL EVERY 12 HOURS
Qty: 28 TABLET | COMMUNITY
Start: 2018-06-22 | End: 2018-06-22

## 2018-06-22 RX ORDER — LEVOTHYROXINE SODIUM 150 UG/1
150 TABLET ORAL DAILY
Qty: 90 TABLET | Refills: 3 | Status: SHIPPED | OUTPATIENT
Start: 2018-06-22 | End: 2019-07-10

## 2018-06-22 NOTE — LETTER
My Depression Action Plan  Name: Arabella Hart   Date of Birth 1965  Date: 6/22/2018    My doctor: Ely Dozier   My clinic: [facility]  [id number] [place] [id number] [phone]          GREEN    ZONE   Good Control    What it looks like:     Things are going generally well. You have normal up s and down s. You may even feel depressed from time to time, but bad moods usually last less than a day.   What you need to do:  1. Continue to care for yourself (see self care plan)  2. Check your depression survival kit and update it as needed  3. Follow your physician s recommendations including any medication.  4. Do not stop taking medication unless you consult with your physician first.           YELLOW         ZONE Getting Worse    What it looks like:     Depression is starting to interfere with your life.     It may be hard to get out of bed; you may be starting to isolate yourself from others.    Symptoms of depression are starting to last most all day and this has happened for several days.     You may have suicidal thoughts but they are not constant.   What you need to do:     1. Call your care team, your response to treatment will improve if you keep your care team informed of your progress. Yellow periods are signs an adjustment may need to be made.     2. Continue your self-care, even if you have to fake it!    3. Talk to someone in your support network    4. Open up your depression survival kit           RED    ZONE Medical Alert - Get Help    What it looks like:     Depression is seriously interfering with your life.     You may experience these or other symptoms: You can t get out of bed most days, can t work or engage in other necessary activities, you have trouble taking care of basic hygiene, or basic responsibilities, thoughts of suicide or death that will not go away, self-injurious behavior.     What you need to do:  1. Call your care team  and request a same-day appointment. If they are not available (weekends or after hours) call your local crisis line, emergency room or 911.            Depression Self Care Plan / Survival Kit    Self-Care for Depression  Here s the deal. Your body and mind are really not as separate as most people think.  What you do and think affects how you feel and how you feel influences what you do and think. This means if you do things that people who feel good do, it will help you feel better.  Sometimes this is all it takes.  There is also a place for medication and therapy depending on how severe your depression is, so be sure to consult with your medical provider and/ or Behavioral Health Consultant if your symptoms are worsening or not improving.     In order to better manage my stress, I will:    Exercise  Get some form of exercise, every day. This will help reduce pain and release endorphins, the  feel good  chemicals in your brain. This is almost as good as taking antidepressants!  This is not the same as joining a gym and then never going! (they count on that by the way ) It can be as simple as just going for a walk or doing some gardening, anything that will get you moving.      Hygiene   Maintain good hygiene (Get out of bed in the morning, Make your bed, Brush your teeth, Take a shower, and Get dressed like you were going to work, even if you are unemployed).  If your clothes don't fit try to get ones that do.    Diet  I will strive to eat foods that are good for me, drink plenty of water, and avoid excessive sugar, caffeine, alcohol, and other mood-altering substances.  Some foods that are helpful in depression are: complex carbohydrates, B vitamins, flaxseed, fish or fish oil, fresh fruits and vegetables.    Psychotherapy  I agree to participate in Individual Therapy (if recommended).    Medication  If prescribed medications, I agree to take them.  Missing doses can result in serious side effects.  I understand  that drinking alcohol, or other illicit drug use, may cause potential side effects.  I will not stop my medication abruptly without first discussing it with my provider.    Staying Connected With Others  I will stay in touch with my friends, family members, and my primary care provider/team.    Use your imagination  Be creative.  We all have a creative side; it doesn t matter if it s oil painting, sand castles, or mud pies! This will also kick up the endorphins.    Witness Beauty  (AKA stop and smell the roses) Take a look outside, even in mid-winter. Notice colors, textures. Watch the squirrels and birds.     Service to others  Be of service to others.  There is always someone else in need.  By helping others we can  get out of ourselves  and remember the really important things.  This also provides opportunities for practicing all the other parts of the program.    Humor  Laugh and be silly!  Adjust your TV habits for less news and crime-drama and more comedy.    Control your stress  Try breathing deep, massage therapy, biofeedback, and meditation. Find time to relax each day.     My support system    Clinic Contact:  Phone number:    Contact 1:  Phone number:    Contact 2:  Phone number:    Congregation/:  Phone number:    Therapist:  Phone number:    Local crisis center:    Phone number:    Other community support:  Phone number:

## 2018-06-22 NOTE — PROGRESS NOTES
[facility]  [id number] [place] [id number] [phone]  Dept: [phone]    PRE-OP EVALUATION:  Today's date: 2018    Arabella Hart (: 1965) presents for pre-operative evaluation assessment as requested by Dr. Boyle.  She requires evaluation and anesthesia risk assessment prior to undergoing surgery/procedure for treatment of left parotid mass.    Fax number for surgical facility: [phone]  Primary Physician: Ely Dozier  Type of Anesthesia Anticipated: General    Patient has a Health Care Directive or Living Will:  NO    Preop Questions 2018   Who is doing your surgery? Dr. Boyle   What are you having done? Left parotidectomy   Date of Surgery/Procedure: 2018   Facility or Hospital where procedure/surgery will be performed: [facility]   1.  Do you have a history of Heart attack, stroke, stent, coronary bypass surgery, or other heart surgery? No   2.  Do you ever have any pain or discomfort in your chest? No   3.  Do you have a history of  Heart Failure? No   4.   Are you troubled by shortness of breath when:  walking on a level surface, or up a slight hill, or at night? No   5.  Do you currently have a cold, bronchitis or other respiratory infection? No   6.  Do you have a cough, shortness of breath, or wheezing? No   7.  Do you sometimes get pains in the calves of your legs when you walk? No   8. Do you or anyone in your family have previous history of blood clots? No   9.  Do you or does anyone in your family have a serious bleeding problem such as prolonged bleeding following surgeries or cuts? No   10. Have you ever had problems with anemia or been told to take iron pills? No   11. Have you had any abnormal blood loss such as black, tarry or bloody stools, or abnormal vaginal bleeding? No   12. Have you ever had a blood transfusion? No   13. Have you or any of your relatives ever had problems with anesthesia?  No   14. Do you have sleep apnea, excessive snoring or daytime drowsiness? No   15. Do you have any prosthetic heart valves? No   16. Do you have prosthetic joints? No   17. Is there any chance that you may be pregnant? No       HPI:     HPI related to upcoming procedure: left parotid mass    See problem list for active medical problems.  Problems all longstanding and stable, except as noted/documented.  See ROS for pertinent symptoms related to these conditions.                                                                                                                                                          .    MEDICAL HISTORY:     Patient Active Problem List    Diagnosis Date Noted     Tobacco use disorder 06/22/2018     Priority: Medium     Menopausal flushing 05/09/2018     Priority: Medium     Hypothyroidism due to Hashimoto's thyroiditis 08/18/2016     Priority: Medium     Irritable bowel syndrome without diarrhea 08/18/2016     Priority: Medium     NAGI (generalized anxiety disorder) 08/18/2016     Priority: Medium     S/P hysterectomy 08/14/2012     Priority: Medium     ASCUS on Pap smear 01/11/2012     Priority: Medium     12/20/06 ASCUS +HPV  1/24/07 [place] Atypical squamous metaplasia  7/11/07 LSIL pap  1/4/12 ASCUS pap.  Await HPV.  HPV positive.  Pt to have colp.   7/11/12 Colposcopy and Dx pap=HSIL, high grade precancerous abnormality.  Hysterectomy completed on 08/14/12. Yearly paps. Reminder in epic.  4/8/14 Dx pap NIL with Neg HPV             Uric acid kidney stones 09/27/2011     Priority: Medium     Problem list name updated by automated process. Provider to review and confirm       Allergic rhinitis      Priority: Medium     Problem list name updated by automated process. Provider to review        Past Medical History:   Diagnosis Date     Abnormal Papanicolaou smear of cervix and cervical HPV     colpo done     ASCUS on Pap smear 12/20/06    + HPV     History of colposcopy with cervical  biopsy 1/24/07    Atypical squamous metaplasia     Past Surgical History:   Procedure Laterality Date     C LIGATE FALLOPIAN TUBE,POSTPARTUM  1998    Tubal Ligation     C NONSPECIFIC PROCEDURE  1983    Right Ankle Surgery     LAPAROSCOPIC CHOLECYSTECTOMY N/A 6/4/2015    Procedure: LAPAROSCOPIC CHOLECYSTECTOMY;  Surgeon: Maria Fernanda Regalado MD;  Location: [facility] OR     LAPAROSCOPIC HYSTERECTOMY TOTAL, BILATERAL SALPINGO-OOPHORECTOMY, COMBINED  8/14/2012    Procedure: COMBINED LAPAROSCOPIC HYSTERECTOMY TOTAL, SALPINGO-OOPHORECTOMY;  LAPAROSCOPIC HYSTERECTOMY TOTAL, Bilateral SALPINGO-OOPHORECTOMY, pelvic exam under anesthesia;  Surgeon: Jolene Baez MD;  Location: [facility] OR     Current Outpatient Prescriptions   Medication Sig Dispense Refill     ACETAMINOPHEN PO Take 325-750 mg by mouth every 6 hours as needed.         citalopram (CELEXA) 40 MG tablet TAKE 1 TABLET (40 MG) BY MOUTH DAILY 90 tablet 1     Fexofenadine HCl (ALLEGRA ALLERGY PO) Take  by mouth.         gabapentin (NEURONTIN) 300 MG capsule TAKE 2 CAPSULES (600 MG) BY MOUTH 3 TIMES DAILY 180 capsule 0     levothyroxine (SYNTHROID/LEVOTHROID) 150 MCG tablet Take 1 tablet (150 mcg) by mouth daily 90 tablet 3     OTC products: None, except as noted above    Allergies   Allergen Reactions     Ibuprofen Itching     Monosodium Glutamate      MSG=sinus headache     Percocet [Oxycodone-Acetaminophen]      Itching         Latex Allergy: NO    Social History   Substance Use Topics     Smoking status: Current Some Day Smoker     Packs/day: 0.50     Years: 15.00     Types: Cigarettes     Smokeless tobacco: Never Used     Alcohol use Yes      Comment: 1-2 drinks seldom     History   Drug Use No       REVIEW OF SYSTEMS:   Constitutional, neuro, ENT, endocrine, pulmonary, cardiac, gastrointestinal, genitourinary, musculoskeletal, integument and psychiatric systems are negative, except as otherwise noted.    EXAM:   /76 (BP Location: Right arm, Patient  "Position: Sitting, Cuff Size: Adult Large)  Pulse 81  Temp 98.7  F (37.1  C) (Oral)  Ht 5' 5\" (1.651 m)  Wt 207 lb (93.9 kg)  LMP 08/12/2012  Breastfeeding? Yes  BMI 34.45 kg/m2    GENERAL APPEARANCE: healthy, alert and no distress     EYES: EOMI, PERRL     HENT: ear canals and TM's normal and nose and mouth without ulcers or lesions     NECK: no adenopathy, no asymmetry, masses, or scars and thyroid normal to palpation     RESP: lungs clear to auscultation - no rales, rhonchi or wheezes     CV: regular rates and rhythm, normal S1 S2, no S3 or S4 and no murmur, click or rub     ABDOMEN:  soft, nontender, no HSM or masses and bowel sounds normal     MS: extremities normal- no gross deformities noted, no evidence of inflammation in joints, FROM in all extremities.     SKIN: no suspicious lesions or rashes     NEURO: Normal strength and tone, sensory exam grossly normal, mentation intact and speech normal     PSYCH: mentation appears normal. and affect normal/bright     LYMPHATICS: No cervical adenopathy    DIAGNOSTICS:   EKG: appears normal, NSR, normal axis, normal intervals, no acute ST/T changes c/w ischemia, no LVH by voltage criteria, unchanged from previous tracings    Recent Labs   Lab Test  05/09/18   1126  06/07/17   0824  01/29/16   0834   05/09/11   1445   HGB  14.0  12.8  14.0   < >   --    PLT  375  336  337   < >   --    NA   --   144  141   < >   --    POTASSIUM   --   4.0  4.0   < >   --    CR   --   0.67  0.64   < >   --    A1C   --    --   5.7   --   5.6    < > = values in this interval not displayed.      IMPRESSION:   Diagnosis/reason for consult: pre operative clearance for parotidectomy    The proposed surgical procedure is considered INTERMEDIATE risk.    REVISED CARDIAC RISK INDEX  The patient has the following serious cardiovascular risks for perioperative complications such as (MI, PE, VFib and 3  AV Block):  No serious cardiac risks  INTERPRETATION: 0 risks: Class I (very low risk - " 0.4% complication rate)    The patient has the following additional risks for perioperative complications:  No identified additional risks      ICD-10-CM    1. Preop general physical exam Z01.818 EKG 12-lead complete w/read - Clinics   2. Parotid mass K11.9    3. NAGI (generalized anxiety disorder) F41.1    4. Tobacco use disorder F17.200        RECOMMENDATIONS:     --Patient is to take all scheduled medications on the day of surgery EXCEPT for modifications listed below.    APPROVAL GIVEN to proceed with proposed procedure, without further diagnostic evaluation       Signed Electronically by: Ely Dozier MD

## 2018-06-22 NOTE — NURSING NOTE
"  Chief Complaint   Patient presents with     Pre-Op Exam       Initial /76 (BP Location: Right arm, Patient Position: Sitting, Cuff Size: Adult Large)  Pulse 81  Temp 98.7  F (37.1  C) (Oral)  Ht 5' 5\" (1.651 m)  Wt 207 lb (93.9 kg)  LMP 08/12/2012  Breastfeeding? Yes  BMI 34.45 kg/m2 Estimated body mass index is 34.45 kg/(m^2) as calculated from the following:    Height as of this encounter: 5' 5\" (1.651 m).    Weight as of this encounter: 207 lb (93.9 kg).  Medication Reconciliation: complete      Health Maintenance addressed:  Mammogram and Colonoscopy/FIT. Pt aware they are do. Wants to get surgery done 1st.    Saeid Barrios CMA          "

## 2018-06-22 NOTE — MR AVS SNAPSHOT
After Visit Summary   6/22/2018    Arabella Hart    MRN: [id number]           Patient Information     Date Of Birth          1965        Visit Information        Provider Department      6/22/2018 10:20 AM Ely Dozier MD [facility]        Today's Diagnoses     Preop general physical exam    -  1    Parotid mass        NAGI (generalized anxiety disorder)        Tobacco use disorder        Hypothyroidism due to Hashimoto's thyroiditis        Female climacteric        Encounter for screening mammogram for breast cancer          Care Instructions      Before Your Surgery      Call your surgeon if there is any change in your health. This includes signs of a cold or flu (such as a sore throat, runny nose, cough, rash or fever).    Do not smoke, drink alcohol or take over the counter medicine (unless your surgeon or primary care doctor tells you to) for the 24 hours before and after surgery.    If you take prescribed drugs: Follow your doctor s orders about which medicines to take and which to stop until after surgery.    Eating and drinking prior to surgery: follow the instructions from your surgeon    Take a shower or bath the night before surgery. Use the soap your surgeon gave you to gently clean your skin. If you do not have soap from your surgeon, use your regular soap. Do not shave or scrub the surgery site.  Wear clean pajamas and have clean sheets on your bed.           Follow-ups after your visit        Follow-up notes from your care team     Return in about 1 year (around 6/22/2019) for Yearly Physical Exam.      Future tests that were ordered for you today     Open Future Orders        Priority Expected Expires Ordered    MA Screening Digital Bilateral Routine  6/22/2019 6/22/2018            Who to contact     If you have questions or need follow up information about today's clinic visit or your schedule please contact [facility] directly at  "[phone].  Normal or non-critical lab and imaging results will be communicated to you by MyChart, letter or phone within 4 business days after the clinic has received the results. If you do not hear from us within 7 days, please contact the clinic through [employer] or phone. If you have a critical or abnormal lab result, we will notify you by phone as soon as possible.  Submit refill requests through [employer] or call your pharmacy and they will forward the refill request to us. Please allow 3 business days for your refill to be completed.          Additional Information About Your Visit        [employer] Information     [employer] gives you secure access to your electronic health record. If you see a primary care provider, you can also send messages to your care team and make appointments. If you have questions, please call your primary care clinic.  If you do not have a primary care provider, please call [phone] and they will assist you.        Care EveryWhere ID     This is your Care EveryWhere ID. This could be used by other organizations to access your [place] medical records  [id number]        Your Vitals Were     Pulse Temperature Height Last Period Breastfeeding? BMI (Body Mass Index)    81 98.7  F (37.1  C) (Oral) 5' 5\" (1.651 m) 08/12/2012 Yes 34.45 kg/m2       Blood Pressure from Last 3 Encounters:   06/22/18 118/76   05/09/18 130/88   06/07/17 110/74    Weight from Last 3 Encounters:   06/22/18 207 lb (93.9 kg)   05/09/18 213 lb (96.6 kg)   06/07/17 209 lb 12.8 oz (95.2 kg)              We Performed the Following     DEPRESSION ACTION PLAN (DAP)     EKG 12-lead complete w/read - Clinics          Today's Medication Changes          These changes are accurate as of 6/22/18 11:16 AM.  If you have any questions, ask your nurse or doctor.               Stop taking these medicines if you haven't already. Please contact your care team if you have questions.     pseudoePHEDrine 120 MG 12 hr tablet   Commonly " known as:  SUDAFED   Stopped by:  Ely Dozier MD                Where to get your medicines      These medications were sent to [facility]/pharmacy #[id number] - [place], [place] - [address] [address].  [address] [address], [facility] [address]     Phone:  [phone]     citalopram 40 MG tablet    levothyroxine 150 MCG tablet                Primary Care Provider Office Phone # Fax #    Ely Dozier [phone] [address]  [facility] [address]        Equal Access to Services     [facility]: Hadii aad ku hadasho Soomaali, waaxda luqadaha, qaybta kaalmada adeegyada, waxay idiin haypradeepn kwabena gutierres . So [facility] [phone].    ATENCIÓN: Si habla español, tiene a parikh disposición servicios gratuitos de asistencia lingüística. [facility] [phone].    We comply with applicable federal civil rights laws and [place] laws. We do not discriminate on the basis of race, color, national origin, age, disability, sex, sexual orientation, or gender identity.            Thank you!     Thank you for choosing [facility]  for your care. Our goal is always to provide you with excellent care. Hearing back from our patients is one way we can continue to improve our services. Please take a few minutes to complete the written survey that you may receive in the mail after your visit with us. Thank you!             Your Updated Medication List - Protect others around you: Learn how to safely use, store and throw away your medicines at www.disposemymeds.org.          This list is accurate as of 6/22/18 11:16 AM.  Always use your most recent med list.                   Brand Name Dispense Instructions for use Diagnosis    ACETAMINOPHEN PO      Take 325-750 mg by mouth every 6 hours as needed.        ALLEGRA ALLERGY PO      Take  by mouth.        citalopram 40 MG tablet    celeXA    90 tablet    TAKE 1 TABLET (40 MG) BY MOUTH DAILY    Female climacteric       gabapentin 300 MG capsule    NEURONTIN     180 capsule    TAKE 2 CAPSULES (600 MG) BY MOUTH 3 TIMES DAILY    Sciatic leg pain       levothyroxine 150 MCG tablet    SYNTHROID/LEVOTHROID    90 tablet    Take 1 tablet (150 mcg) by mouth daily    Hypothyroidism due to Hashimoto's thyroiditis

## 2018-06-27 ENCOUNTER — TRANSFERRED RECORDS (OUTPATIENT)
Dept: HEALTH INFORMATION MANAGEMENT | Facility: CLINIC | Age: 53
End: 2018-06-27

## 2018-07-03 DIAGNOSIS — E06.3 HYPOTHYROIDISM DUE TO HASHIMOTO'S THYROIDITIS: ICD-10-CM

## 2018-07-03 RX ORDER — LEVOTHYROXINE SODIUM 150 UG/1
TABLET ORAL
Qty: 90 TABLET | Refills: 3 | Status: SHIPPED | OUTPATIENT
Start: 2018-07-03 | End: 2018-12-12

## 2018-07-03 NOTE — TELEPHONE ENCOUNTER
"Requested Prescriptions   Pending Prescriptions Disp Refills     levothyroxine (SYNTHROID/LEVOTHROID) 150 MCG tablet [Pharmacy Med Name: LEVOTHYROXINE 150 MCG TABLET]  Last Written Prescription Date:  6/22/2018  Last Fill Quantity: 90,  # refills: 3   Last office visit: 6/7/2017 with prescribing provider:     Future Office Visit:   90 tablet 3     Sig: TAKE 1 TABLET (150 MCG) BY MOUTH DAILY    Thyroid Protocol Failed    7/3/2018  1:44 AM       Failed - Recent (12 mo) or future (30 days) visit within the authorizing provider's specialty    Patient had office visit in the last 12 months or has a visit in the next 30 days with authorizing provider or within the authorizing provider's specialty.  See \"Patient Info\" tab in inbasket, or \"Choose Columns\" in Meds & Orders section of the refill encounter.           Passed - Patient is 12 years or older       Passed - Normal TSH on file in past 12 months    Recent Labs   Lab Test  05/09/18   1126   TSH  1.18             Passed - No active pregnancy on record    If patient is pregnant or has had a positive pregnancy test, please check TSH.         Passed - No positive pregnancy test in past 12 months    If patient is pregnant or has had a positive pregnancy test, please check TSH.          "

## 2018-07-12 ENCOUNTER — TRANSFERRED RECORDS (OUTPATIENT)
Dept: HEALTH INFORMATION MANAGEMENT | Facility: CLINIC | Age: 53
End: 2018-07-12

## 2018-07-23 DIAGNOSIS — M54.30 SCIATIC LEG PAIN: ICD-10-CM

## 2018-07-23 RX ORDER — GABAPENTIN 300 MG/1
CAPSULE ORAL
Qty: 180 CAPSULE | Refills: 0 | Status: SHIPPED | OUTPATIENT
Start: 2018-07-23 | End: 2018-08-25

## 2018-07-23 NOTE — TELEPHONE ENCOUNTER
RX [place] ([place] reviewed:  reviewed- no concerns    [place]  [email or web address]

## 2018-07-23 NOTE — TELEPHONE ENCOUNTER
Controlled Substance Refill Request for gabapentin (NEURONTIN) 300 MG capsule  Problem List Complete:  No     PROVIDER TO CONSIDER COMPLETION OF PROBLEM LIST AND OVERVIEW/CONTROLLED SUBSTANCE AGREEMENT    Last Written Prescription Date:  05/29/2018  Last Fill Quantity: 180 capsule,   # refills: 0    Last Office Visit with [facility] primary care provider: 06/22/2018    Future Office visit:     Controlled substance agreement on file: No.     Processing:  Fax Rx to [facility] pharmacy   checked in past 3 months?  No, route to IRENA STODDARD

## 2018-08-02 ENCOUNTER — OFFICE VISIT (OUTPATIENT)
Dept: FAMILY MEDICINE | Facility: CLINIC | Age: 53
End: 2018-08-02
Payer: COMMERCIAL

## 2018-08-02 ENCOUNTER — RADIANT APPOINTMENT (OUTPATIENT)
Dept: GENERAL RADIOLOGY | Facility: CLINIC | Age: 53
End: 2018-08-02
Attending: FAMILY MEDICINE
Payer: COMMERCIAL

## 2018-08-02 VITALS
DIASTOLIC BLOOD PRESSURE: 74 MMHG | SYSTOLIC BLOOD PRESSURE: 126 MMHG | HEART RATE: 88 BPM | TEMPERATURE: 98.8 F | HEIGHT: 65 IN | OXYGEN SATURATION: 98 %

## 2018-08-02 DIAGNOSIS — M25.461 EFFUSION OF RIGHT KNEE: ICD-10-CM

## 2018-08-02 DIAGNOSIS — M25.561 ACUTE PAIN OF RIGHT KNEE: ICD-10-CM

## 2018-08-02 DIAGNOSIS — M25.561 ACUTE PAIN OF RIGHT KNEE: Primary | ICD-10-CM

## 2018-08-02 LAB
APPEARANCE FLD: NORMAL
COLOR FLD: YELLOW
CRP SERPL-MCNC: 9.2 MG/L (ref 0–8)
CRYSTALS SNV MICRO: NORMAL
ERYTHROCYTE [DISTWIDTH] IN BLOOD BY AUTOMATED COUNT: 15.1 % (ref 10–15)
ERYTHROCYTE [SEDIMENTATION RATE] IN BLOOD BY WESTERGREN METHOD: 7 MM/H (ref 0–30)
GRAM STN SPEC: NORMAL
HCT VFR BLD AUTO: 38.3 % (ref 35–47)
HGB BLD-MCNC: 12.5 G/DL (ref 11.7–15.7)
LYMPHOCYTES NFR FLD MANUAL: 4 %
MCH RBC QN AUTO: 30.9 PG (ref 26.5–33)
MCHC RBC AUTO-ENTMCNC: 32.6 G/DL (ref 31.5–36.5)
MCV RBC AUTO: 95 FL (ref 78–100)
MONOS+MACROS NFR FLD MANUAL: 95 %
NEUTS BAND NFR FLD MANUAL: 1 %
PLATELET # BLD AUTO: 363 10E9/L (ref 150–450)
RBC # BLD AUTO: 4.04 10E12/L (ref 3.8–5.2)
SPECIMEN SOURCE FLD: NORMAL
SPECIMEN SOURCE: NORMAL
SPECIMEN SOURCE: NORMAL
URATE SERPL-MCNC: 4.1 MG/DL (ref 2.6–6)
WBC # BLD AUTO: 14.8 10E9/L (ref 4–11)
WBC # FLD AUTO: 518 /UL

## 2018-08-02 PROCEDURE — 89051 BODY FLUID CELL COUNT: CPT | Performed by: FAMILY MEDICINE

## 2018-08-02 PROCEDURE — 85027 COMPLETE CBC AUTOMATED: CPT | Performed by: FAMILY MEDICINE

## 2018-08-02 PROCEDURE — 84550 ASSAY OF BLOOD/URIC ACID: CPT | Performed by: FAMILY MEDICINE

## 2018-08-02 PROCEDURE — 99214 OFFICE O/P EST MOD 30 MIN: CPT | Performed by: FAMILY MEDICINE

## 2018-08-02 PROCEDURE — 89060 EXAM SYNOVIAL FLUID CRYSTALS: CPT | Performed by: FAMILY MEDICINE

## 2018-08-02 PROCEDURE — 86140 C-REACTIVE PROTEIN: CPT | Performed by: FAMILY MEDICINE

## 2018-08-02 PROCEDURE — 87205 SMEAR GRAM STAIN: CPT | Performed by: FAMILY MEDICINE

## 2018-08-02 PROCEDURE — 85652 RBC SED RATE AUTOMATED: CPT | Performed by: FAMILY MEDICINE

## 2018-08-02 PROCEDURE — 73562 X-RAY EXAM OF KNEE 3: CPT | Mod: RT

## 2018-08-02 PROCEDURE — 86618 LYME DISEASE ANTIBODY: CPT | Performed by: FAMILY MEDICINE

## 2018-08-02 PROCEDURE — 36415 COLL VENOUS BLD VENIPUNCTURE: CPT | Performed by: FAMILY MEDICINE

## 2018-08-02 PROCEDURE — 87070 CULTURE OTHR SPECIMN AEROBIC: CPT | Performed by: FAMILY MEDICINE

## 2018-08-02 RX ORDER — INDOMETHACIN 50 MG/1
50 CAPSULE ORAL
Qty: 42 CAPSULE | Refills: 0 | Status: SHIPPED | OUTPATIENT
Start: 2018-08-02 | End: 2018-08-22

## 2018-08-02 RX ORDER — COVID-19 ANTIGEN TEST
220 KIT MISCELLANEOUS 2 TIMES DAILY WITH MEALS
COMMUNITY
End: 2021-03-08

## 2018-08-02 NOTE — MR AVS SNAPSHOT
After Visit Summary   8/2/2018    Arabella Hart    MRN: [id number]           Patient Information     Date Of Birth          1965        Visit Information        Provider Department      8/2/2018 7:40 AM Elian Hernandez MD [facility]        Today's Diagnoses     Acute pain of right knee    -  1    Effusion of right knee           Follow-ups after your visit        Follow-up notes from your care team     Return in about 1 month (around 9/2/2018) for if not improving or sooner if worsening symptoms.      Future tests that were ordered for you today     Open Future Orders        Priority Expected Expires Ordered    MR Knee Right w/o Contrast Routine  8/3/2019 8/3/2018            Who to contact     If you have questions or need follow up information about today's clinic visit or your schedule please contact [facility] directly at [phone].  Normal or non-critical lab and imaging results will be communicated to you by MyChart, letter or phone within 4 business days after the clinic has received the results. If you do not hear from us within 7 days, please contact the clinic through [employer] or phone. If you have a critical or abnormal lab result, we will notify you by phone as soon as possible.  Submit refill requests through [employer] or call your pharmacy and they will forward the refill request to us. Please allow 3 business days for your refill to be completed.          Additional Information About Your Visit        MyChart Information     [employer] gives you secure access to your electronic health record. If you see a primary care provider, you can also send messages to your care team and make appointments. If you have questions, please call your primary care clinic.  If you do not have a primary care provider, please call [phone] and they will assist you.        Care EveryWhere ID     This is your Care EveryWhere ID. This could be used by other  "organizations to access your [employer] medical records  [id number]        Your Vitals Were     Pulse Temperature Height Last Period Pulse Oximetry Breastfeeding?    88 98.8  F (37.1  C) (Oral) 5' 5\" (1.651 m) 08/12/2012 98% No       Blood Pressure from Last 3 Encounters:   08/02/18 126/74   06/22/18 118/76   05/09/18 130/88    Weight from Last 3 Encounters:   06/22/18 207 lb (93.9 kg)   05/09/18 213 lb (96.6 kg)   06/07/17 209 lb 12.8 oz (95.2 kg)              We Performed the Following     CBC with platelets     Cell count with differential fluid     CRP inflammation     Crystal ID synovial fluid     Erythrocyte sedimentation rate auto     Fluid Culture Aerobic Bacterial     Gram stain     Gram stain     Lyme Disease Jumana with reflex to WB Serum     Uric acid          Today's Medication Changes          These changes are accurate as of 8/2/18 11:59 PM.  If you have any questions, ask your nurse or doctor.               Start taking these medicines.        Dose/Directions    indomethacin 50 MG capsule   Commonly known as:  INDOCIN   Used for:  Acute pain of right knee, Effusion of right knee   Started by:  Elian Hernandez MD        Dose:  50 mg   Take 1 capsule (50 mg) by mouth 3 times daily (with meals)   Quantity:  42 capsule   Refills:  0            Where to get your medicines      These medications were sent to [facility]/pharmacy #[id number] - [place], [place] - [address] [address].  [address] [place] [address]     Phone:  [phone]     indomethacin 50 MG capsule                Primary Care Provider Office Phone # Fax #    Ely Calixto Dozier [phone] [address]  [facility] [address]        Equal Access to Services     AUSTYN AGUILAR : Hadned ayala Soozzy, waaxda luqadaha, qaybta kaalmada jolynn ceja. So [facility] [phone].    ATENCIÓN: Si habla español, tiene a parikh disposición servicios gratuitos de asistencia lingüística. Llame al " [phone].    We comply with applicable federal civil rights laws and [place] laws. We do not discriminate on the basis of race, color, national origin, age, disability, sex, sexual orientation, or gender identity.            Thank you!     Thank you for choosing [facility]  for your care. Our goal is always to provide you with excellent care. Hearing back from our patients is one way we can continue to improve our services. Please take a few minutes to complete the written survey that you may receive in the mail after your visit with us. Thank you!             Your Updated Medication List - Protect others around you: Learn how to safely use, store and throw away your medicines at www.disposemymeds.org.          This list is accurate as of 8/2/18 11:59 PM.  Always use your most recent med list.                   Brand Name Dispense Instructions for use Diagnosis    ACETAMINOPHEN PO      Take 325-750 mg by mouth every 6 hours as needed.        ALLEGRA ALLERGY PO      Take  by mouth.        citalopram 40 MG tablet    celeXA    90 tablet    TAKE 1 TABLET (40 MG) BY MOUTH DAILY    Female climacteric       gabapentin 300 MG capsule    NEURONTIN    180 capsule    TAKE 2 CAPSULES (600 MG) BY MOUTH 3 TIMES DAILY    Sciatic leg pain       indomethacin 50 MG capsule    INDOCIN    42 capsule    Take 1 capsule (50 mg) by mouth 3 times daily (with meals)    Acute pain of right knee, Effusion of right knee       * levothyroxine 150 MCG tablet    SYNTHROID/LEVOTHROID    90 tablet    Take 1 tablet (150 mcg) by mouth daily    Hypothyroidism due to Hashimoto's thyroiditis       * levothyroxine 150 MCG tablet    SYNTHROID/LEVOTHROID    90 tablet    TAKE 1 TABLET (150 MCG) BY MOUTH DAILY    Hypothyroidism due to Hashimoto's thyroiditis       naproxen sodium 220 MG capsule      Take 220 mg by mouth 2 times daily (with meals)    Acute pain of right knee, Effusion of right knee       * Notice:  This list has 2  medication(s) that are the same as other medications prescribed for you. Read the directions carefully, and ask your doctor or other care provider to review them with you.

## 2018-08-02 NOTE — PROGRESS NOTES
SUBJECTIVE:   Arabella Hart is a 53 year old female who presents to clinic today for the following health issues:    Patient reports right knee swelling for the past week. She also notes burning pain and decreased range of motion due to the swelling. The burning sensation worsens in the morning. The knee feels unstable. She has tried naproxen, rest, ice, elevation and compression. The pain improves with walking. One year ago, she fell while climbing down stairs and denies other injuries. Denies tick bite. Denies other joint pain or redness.     Problem list and histories reviewed & adjusted, as indicated.  Additional history: as documented    Patient Active Problem List   Diagnosis     Allergic rhinitis     Uric acid kidney stones     ASCUS on Pap smear     S/P hysterectomy     Hypothyroidism due to Hashimoto's thyroiditis     Irritable bowel syndrome without diarrhea     NAGI (generalized anxiety disorder)     Menopausal flushing     Tobacco use disorder     Past Surgical History:   Procedure Laterality Date     C LIGATE FALLOPIAN TUBE,POSTPARTUM  1998    Tubal Ligation     C NONSPECIFIC PROCEDURE  1983    Right Ankle Surgery     LAPAROSCOPIC CHOLECYSTECTOMY N/A 6/4/2015    Procedure: LAPAROSCOPIC CHOLECYSTECTOMY;  Surgeon: Maria Fernanda Regalado MD;  Location: [facility]     LAPAROSCOPIC HYSTERECTOMY TOTAL, BILATERAL SALPINGO-OOPHORECTOMY, COMBINED  8/14/2012    Procedure: COMBINED LAPAROSCOPIC HYSTERECTOMY TOTAL, SALPINGO-OOPHORECTOMY;  LAPAROSCOPIC HYSTERECTOMY TOTAL, Bilateral SALPINGO-OOPHORECTOMY, pelvic exam under anesthesia;  Surgeon: Jolene Baez MD;  Location: [facility] OR       Social History   Substance Use Topics     Smoking status: Current Some Day Smoker     Packs/day: 0.50     Years: 15.00     Types: Cigarettes     Smokeless tobacco: Never Used     Alcohol use Yes      Comment: 1-2 drinks seldom     Family History   Problem Relation Age of Onset     Breast Cancer Paternal Grandmother       "Thyroid Disease Mother      Hypothyroid     Cancer Mother      Ovarian - age 61     Coronary Artery Disease Paternal Grandfather      Hypertension Father      Heart complication after MI.       Cancer Father      Throat (Epiglottis) smoker     HEART DISEASE Father       of heart attack     Coronary Artery Disease Father      Breast Cancer Paternal Aunt      and P great aunt     Breast Cancer Paternal Aunt      Coronary Artery Disease Brother            Reviewed and updated as needed this visit by clinical staff  Tobacco  Allergies  Meds  Problems  Med Hx  Surg Hx  Fam Hx  Soc Hx        Reviewed and updated as needed this visit by Provider  Problems         ROS:  MUSCULOSKELETAL: as noted above   NEURO: as noted above     This document serves as a record of the services and decisions personally performed and made by Elian Hernandez MD. It was created on his behalf by Helen Alford, a trained medical scribe. The creation of this document is based on the provider's statements to the medical scribe.  Helen Alford 7:55 AM 2018    OBJECTIVE:     /74 (BP Location: Right arm, Patient Position: Chair, Cuff Size: Adult Regular)  Pulse 88  Temp 98.8  F (37.1  C) (Oral)  Ht 5' 5\" (1.651 m)  LMP 2012  SpO2 98%  Breastfeeding? No  There is no height or weight on file to calculate BMI.  GENERAL: healthy, alert and no distress  MS: moderate effusion, no erythema, no warmth, range of motion slightly limited by swelling    X-ray Right knee ordered and interpreted in the office today. X-ray shows: normal.  Radiology read pending.    PROCEDURE: Aspiration right knee  Consent discussed including, but not limited to, risk of infection, fat atrophy, and bleeding.  Effected area cleaned with betadine x 3.  1 pecent plain lidocaine 0.5 mL injected in overlying skin.  18 ga needle used to aspirate 5 ml clear/yellow fluid.  Patient tolerated procedure well.  No complications.    Diagnostic " Test Results:  Results for orders placed or performed in visit on 08/02/18 (from the past 24 hour(s))   Uric acid   Result Value Ref Range    Uric Acid 4.1 2.6 - 6.0 mg/dL   CRP inflammation   Result Value Ref Range    CRP Inflammation 9.2 (H) 0.0 - 8.0 mg/L   CBC with platelets   Result Value Ref Range    WBC 14.8 (H) 4.0 - 11.0 10e9/L    RBC Count 4.04 3.8 - 5.2 10e12/L    Hemoglobin 12.5 11.7 - 15.7 g/dL    Hematocrit 38.3 35.0 - 47.0 %    MCV 95 78 - 100 fl    MCH 30.9 26.5 - 33.0 pg    MCHC 32.6 31.5 - 36.5 g/dL    RDW 15.1 (H) 10.0 - 15.0 %    Platelet Count 363 150 - 450 10e9/L   Erythrocyte sedimentation rate auto   Result Value Ref Range    Sed Rate 7 0 - 30 mm/h   Cell count with differential fluid   Result Value Ref Range    Body Fluid Analysis Source Synovial fluid     Color Fluid Yellow     Appearance Fluid Slightly Cloudy     WBC Fluid 518 /uL    % Neutrophils Fluid 1 %    % Lymphocytes Fluid 4 %    % Mono/Macro Fluid 95 %   Gram stain   Result Value Ref Range    Specimen Description Synovial fluid     Gram Stain       Canceled, Test credited  Duplicate request  Accession [id number]     Crystal ID synovial fluid   Result Value Ref Range    Crystal Analysis No clincally significant crystals seen.  NOCRYS^No clincally significant crystals seen.   Fluid Culture Aerobic Bacterial   Result Value Ref Range    Specimen Description Joint fluid Right Knee     Culture Micro PENDING    Gram stain   Result Value Ref Range    Specimen Description Joint fluid Right Knee     Gram Stain No organisms seen     Gram Stain No WBC's seen        ASSESSMENT/PLAN:     1. Acute pain of right knee  Knee pain with effusion - XR normal.  Possible occult injury or meniscus injury vs gout or pseudogout with her history uric acid nephrolithiasis.  Joint is not warm/red as typical for significant gout but will do fluid evaluation.  MRI looking for occult injury if other tests normal.  - naproxen sodium 220 MG capsule; Take 220 mg by  mouth 2 times daily (with meals)  - XR Knee Right 3 Views; Future  - Uric acid  - Lyme Disease Jumana with reflex to WB Serum  - CRP inflammation  - CBC with platelets  - Erythrocyte sedimentation rate auto  - Cell count with differential fluid  - Gram stain  - Crystal ID synovial fluid  - indomethacin (INDOCIN) 50 MG capsule; Take 1 capsule (50 mg) by mouth 3 times daily (with meals)  Dispense: 42 capsule; Refill: 0    2. Effusion of right knee  - naproxen sodium 220 MG capsule; Take 220 mg by mouth 2 times daily (with meals)  - XR Knee Right 3 Views; Future  - Uric acid  - Lyme Disease Jumana with reflex to WB Serum  - CRP inflammation  - CBC with platelets  - Erythrocyte sedimentation rate auto  - Cell count with differential fluid  - Gram stain  - Crystal ID synovial fluid  - indomethacin (INDOCIN) 50 MG capsule; Take 1 capsule (50 mg) by mouth 3 times daily (with meals)  Dispense: 42 capsule; Refill: 0    The information in this document, created by the medical scribe for me, accurately reflects the services I personally performed and the decisions made by me. I have reviewed and approved this document for accuracy prior to leaving the patient care area.  August 2, 2018 8:35 AM    Elian Hernandez MD  [facility]

## 2018-08-02 NOTE — PROGRESS NOTES
Per lab needs aerobic culture in order to do a gram stain so order placed  Graeme Whittington RN, BSN

## 2018-08-03 LAB
B BURGDOR IGG+IGM SER QL: 0.02 (ref 0–0.89)
RHEUMATOID FACT SER NEPH-ACNC: <20 IU/ML (ref 0–20)

## 2018-08-03 PROCEDURE — 86431 RHEUMATOID FACTOR QUANT: CPT | Performed by: FAMILY MEDICINE

## 2018-08-03 PROCEDURE — 36415 COLL VENOUS BLD VENIPUNCTURE: CPT | Performed by: FAMILY MEDICINE

## 2018-08-07 LAB
BACTERIA SPEC CULT: NO GROWTH
SPECIMEN SOURCE: NORMAL

## 2018-08-22 DIAGNOSIS — M25.461 EFFUSION OF RIGHT KNEE: ICD-10-CM

## 2018-08-22 DIAGNOSIS — M25.561 ACUTE PAIN OF RIGHT KNEE: ICD-10-CM

## 2018-08-22 NOTE — TELEPHONE ENCOUNTER
"Requested Prescriptions   Pending Prescriptions Disp Refills     indomethacin (INDOCIN) 50 MG capsule [Pharmacy Med Name: INDOMETHACIN 50 MG CAPSULE] 42 capsule 0    Last Written Prescription Date:  08/02/2018  Last Fill Quantity: 42 capsule,  # refills: 0   Last office visit: 8/2/2018 with prescribing provider:  08/02/2018   Future Office Visit:     Sig: TAKE 1 CAPSULE (50 MG) BY MOUTH 3 TIMES DAILY (WITH MEALS)    Gout Agents Protocol Failed    8/22/2018  1:15 PM       Failed - ALT on file in past 12 months    Recent Labs   Lab Test  06/07/17   0824   ALT  32            Failed - Normal serum creatinine on file in the past 12 months    Recent Labs   Lab Test  06/07/17   0824   CR  0.67            Passed - CBC on file in past 12 months    Recent Labs   Lab Test  08/02/18   0837   WBC  14.8*   RBC  4.04   HGB  12.5   HCT  38.3   PLT  363       For [facility] ONLY: [id number] = WBC, UPSK129 = RBC         Passed - Has Uric Acid on file in past 12 months and value is less than 6    Recent Labs   Lab Test  08/02/18   0837   URIC  4.1     If level is 6mg/dL or greater, ok to refill one time and refer to provider.          Passed - Recent (12 mo) or future (30 days) visit within the authorizing provider's specialty    Patient had office visit in the last 12 months or has a visit in the next 30 days with authorizing provider or within the authorizing provider's specialty.  See \"Patient Info\" tab in inbasket, or \"Choose Columns\" in Meds & Orders section of the refill encounter.           Passed - Patient is age 18 or older       Passed - No active pregnancy on record       Passed - No positive pregnancy test in past year          Gonzalez HERNANDEZT  "

## 2018-08-22 NOTE — TELEPHONE ENCOUNTER
Mychart sent to clarify request.  Pt wanted an MRI ordered but hasn't scheduled yet  Graeme Whittington RN, BSN

## 2018-08-24 RX ORDER — INDOMETHACIN 50 MG/1
CAPSULE ORAL
Qty: 42 CAPSULE | Refills: 0 | Status: ON HOLD | OUTPATIENT
Start: 2018-08-24 | End: 2018-12-11

## 2018-08-24 NOTE — TELEPHONE ENCOUNTER
I think MRI looking for occult meniscus tear or other injury that could cause recurrent swelling is best as labs were normal for inflammatory arthritis.    Refilled med for symptoms until then.

## 2018-08-25 DIAGNOSIS — M54.30 SCIATIC LEG PAIN: ICD-10-CM

## 2018-08-25 NOTE — TELEPHONE ENCOUNTER
Requested Prescriptions   Pending Prescriptions Disp Refills     gabapentin (NEURONTIN) 300 MG capsule  Last Written Prescription Date:  07/23/2018  Last Fill Quantity: 180,  # refills: 0   Last office visit: 8/2/2018 with prescribing provider:  08/02/2018   Future Office Visit:     180 capsule 0     Sig: TAKE 2 CAPSULES (600 MG) BY MOUTH 3 TIMES DAILY    There is no refill protocol information for this order

## 2018-08-27 NOTE — TELEPHONE ENCOUNTER
RX [place] reviewed:  reviewed- no concerns    [place]:  [email or web address]

## 2018-08-29 RX ORDER — GABAPENTIN 300 MG/1
CAPSULE ORAL
Qty: 180 CAPSULE | Refills: 0 | Status: SHIPPED | OUTPATIENT
Start: 2018-08-29 | End: 2018-09-24

## 2018-09-04 ENCOUNTER — HOSPITAL ENCOUNTER (OUTPATIENT)
Dept: MRI IMAGING | Facility: CLINIC | Age: 53
Discharge: HOME | End: 2018-09-04
Attending: FAMILY MEDICINE | Admitting: FAMILY MEDICINE
Payer: COMMERCIAL

## 2018-09-04 DIAGNOSIS — M25.561 ACUTE PAIN OF RIGHT KNEE: ICD-10-CM

## 2018-09-04 DIAGNOSIS — M25.461 EFFUSION OF RIGHT KNEE: ICD-10-CM

## 2018-09-04 PROCEDURE — 73721 MRI JNT OF LWR EXTRE W/O DYE: CPT | Mod: RT

## 2018-09-05 ENCOUNTER — OFFICE VISIT (OUTPATIENT)
Dept: ORTHOPEDICS | Facility: CLINIC | Age: 53
End: 2018-09-05
Payer: COMMERCIAL

## 2018-09-05 ENCOUNTER — MYC MEDICAL ADVICE (OUTPATIENT)
Dept: ORTHOPEDICS | Facility: CLINIC | Age: 53
End: 2018-09-05

## 2018-09-05 VITALS — WEIGHT: 210 LBS | SYSTOLIC BLOOD PRESSURE: 100 MMHG | DIASTOLIC BLOOD PRESSURE: 62 MMHG | BODY MASS INDEX: 34.95 KG/M2

## 2018-09-05 DIAGNOSIS — M25.461 EFFUSION OF KNEE JOINT RIGHT: Primary | ICD-10-CM

## 2018-09-05 DIAGNOSIS — M17.11 PRIMARY OSTEOARTHRITIS OF RIGHT KNEE: ICD-10-CM

## 2018-09-05 PROCEDURE — 20610 DRAIN/INJ JOINT/BURSA W/O US: CPT | Mod: RT | Performed by: ORTHOPAEDIC SURGERY

## 2018-09-05 PROCEDURE — 99203 OFFICE O/P NEW LOW 30 MIN: CPT | Mod: 25 | Performed by: ORTHOPAEDIC SURGERY

## 2018-09-05 RX ORDER — LIDOCAINE HYDROCHLORIDE 10 MG/ML
8 INJECTION, SOLUTION EPIDURAL; INFILTRATION; INTRACAUDAL; PERINEURAL
Status: DISCONTINUED | OUTPATIENT
Start: 2018-09-05 | End: 2019-07-10

## 2018-09-05 RX ORDER — TESTOSTERONE CYPIONATE 200 MG/ML
2 INJECTION INTRAMUSCULAR
Status: DISCONTINUED | OUTPATIENT
Start: 2018-09-05 | End: 2019-07-10

## 2018-09-05 RX ADMIN — LIDOCAINE HYDROCHLORIDE 8 ML: 10 INJECTION, SOLUTION EPIDURAL; INFILTRATION; INTRACAUDAL; PERINEURAL at 09:47

## 2018-09-05 RX ADMIN — TESTOSTERONE CYPIONATE 2 ML: 200 INJECTION INTRAMUSCULAR at 09:47

## 2018-09-05 NOTE — LETTER
9/5/2018         RE: Arabella Hart  [address] [facility] [address]        Dear Colleague,    Thank you for referring your patient, Arabella Hart, to the [facility]. Please see a copy of my visit note below.    HISTORY OF PRESENT ILLNESS:    Arabella Hart is a 53 year old female who is seen in consultation at the request of Dr. Hernandez for right knee pain. Patient reports gradual onset of pain progressively worsening over the last 3 months, and most severe pain over that last 1 month.  Patient does recall a fall 1 year ago where she 2 stairs, but does not recall having significant knee pain afterwards.   Patient works as a nurse.     Present symptoms: Anterolateral knee pain. Pain can be burning, sharp, aching, and throbbing.  Constant swelling of the right knee.  Patient reports frequent popping of the knee. She notes limited ROM due to swelling in the knee. Patient reports bouts of instability, with no recent falls.     Treatments tried to this point:  Ice, Rest, Elevation, Compression, 50mg Indocin   Orthopedic PMH: Right sided sciatica.     Past Medical History:   Diagnosis Date     Abnormal Papanicolaou smear of cervix and cervical HPV     colpo done     ASCUS on Pap smear 12/20/06    + HPV     History of colposcopy with cervical biopsy 1/24/07    Atypical squamous metaplasia       Past Surgical History:   Procedure Laterality Date     C LIGATE FALLOPIAN TUBE,POSTPARTUM  1998    Tubal Ligation     C NONSPECIFIC PROCEDURE  1983    Right Ankle Surgery     LAPAROSCOPIC CHOLECYSTECTOMY N/A 6/4/2015    Procedure: LAPAROSCOPIC CHOLECYSTECTOMY;  Surgeon: Maria Fernanda Regalado MD;  Location: [facility]     LAPAROSCOPIC HYSTERECTOMY TOTAL, BILATERAL SALPINGO-OOPHORECTOMY, COMBINED  8/14/2012    Procedure: COMBINED LAPAROSCOPIC HYSTERECTOMY TOTAL, SALPINGO-OOPHORECTOMY;  LAPAROSCOPIC HYSTERECTOMY TOTAL, Bilateral SALPINGO-OOPHORECTOMY, pelvic exam under anesthesia;   Surgeon: Jolene Baez MD;  Location: [facility] OR       Family History   Problem Relation Age of Onset     Breast Cancer Paternal Grandmother      Thyroid Disease Mother      Hypothyroid     Cancer Mother      Ovarian - age 61     Coronary Artery Disease Paternal Grandfather      Hypertension Father      Heart complication after MI.       Cancer Father      Throat (Epiglottis) smoker     HEART DISEASE Father       of heart attack     Coronary Artery Disease Father      Breast Cancer Paternal Aunt      and P great aunt     Breast Cancer Paternal Aunt      Coronary Artery Disease Brother        Social History     Social History     Marital status:      Spouse name: N/A     Number of children: N/A     Years of education: N/A     Occupational History     Not on file.     Social History Main Topics     Smoking status: Current Some Day Smoker     Packs/day: 0.50     Years: 15.00     Types: Cigarettes     Smokeless tobacco: Never Used     Alcohol use Yes      Comment: 1-2 drinks seldom     Drug use: No     Sexual activity: Yes     Partners: Male     Birth control/ protection: Female Surgical      Comment: hysterectomy     Other Topics Concern     Not on file     Social History Narrative       Current Outpatient Prescriptions   Medication Sig Dispense Refill     ACETAMINOPHEN PO Take 325-750 mg by mouth every 6 hours as needed.         citalopram (CELEXA) 40 MG tablet TAKE 1 TABLET (40 MG) BY MOUTH DAILY 90 tablet 3     Fexofenadine HCl (ALLEGRA ALLERGY PO) Take  by mouth.         gabapentin (NEURONTIN) 300 MG capsule TAKE 2 CAPSULES (600 MG) BY MOUTH 3 TIMES DAILY 180 capsule 0     indomethacin (INDOCIN) 50 MG capsule TAKE 1 CAPSULE (50 MG) BY MOUTH 3 TIMES DAILY (WITH MEALS) 42 capsule 0     levothyroxine (SYNTHROID/LEVOTHROID) 150 MCG tablet TAKE 1 TABLET (150 MCG) BY MOUTH DAILY 90 tablet 3     levothyroxine (SYNTHROID/LEVOTHROID) 150 MCG tablet Take 1 tablet (150 mcg) by mouth daily 90 tablet  3     naproxen sodium 220 MG capsule Take 220 mg by mouth 2 times daily (with meals)         Allergies   Allergen Reactions     Ibuprofen Itching     Monosodium Glutamate      MSG=sinus headache     Percocet [Oxycodone-Acetaminophen]      Itching          REVIEW OF SYSTEMS:  CONSTITUTIONAL:  NEGATIVE for fever, chills, change in weight  INTEGUMENTARY/SKIN:  NEGATIVE for worrisome rashes, moles or lesions  EYES:  NEGATIVE for vision changes or irritation  ENT/MOUTH:  NEGATIVE for ear, mouth and throat problems  RESP:  NEGATIVE for significant cough or SOB  BREAST:  NEGATIVE for masses, tenderness or discharge  CV:  NEGATIVE for chest pain, palpitations or peripheral edema  GI:  NEGATIVE for nausea, abdominal pain, heartburn, or change in bowel habits  :  Negative   MUSCULOSKELETAL:  See HPI above  NEURO:  NEGATIVE for weakness, dizziness or paresthesias  ENDOCRINE:  NEGATIVE for temperature intolerance, skin/hair changes  HEME/ALLERGY/IMMUNE:  NEGATIVE for bleeding problems  PSYCHIATRIC:  NEGATIVE for changes in mood or affect      PHYSICAL EXAM:  /62 (BP Location: Right arm, Patient Position: Chair, Cuff Size: Adult Large)  Wt 210 lb (95.3 kg)  LMP 08/12/2012  BMI 34.95 kg/m2  Body mass index is 34.95 kg/(m^2).   GENERAL APPEARANCE: healthy, alert and no distress   HEENT: No apparent thyroid megaly. Clear sclera with normal ocular movement  RESPIRATORY: No labored breathing  SKIN: no suspicious lesions or rashes  NEURO: Normal strength and tone, mentation intact and speech normal  VASCULAR: Good pulses, and capillary refill   LYMPH: no lymphadenopathy   PSYCH:  mentation appears normal and affect normal/bright    MUSCULOSKELETAL:  Some difficulty of getting up from sitting  Mild limping when she walks  Mild to moderate effusion, right knee  Range of motion is fairly symmetrical  Moderate patellofemoral crepitus, right and mild patellofemoral crepitus, left  Pain with patellofemoral compression, more  noticeable in the right  Ligaments are stable throughout the knee  Negative Caryn's  No significant medial joint line pain with a palpation  Collateral ligaments are stable  Extensor mechanism is intact     ASSESSMENT:  Patellofemoral chondromalacia with patella maltracking  Effusion, right knee  Mild degenerative medial meniscus tear, most likely not the source of the symptoms    PLAN:  We visualized the images of the right knee performed just yesterday, September 4, 2018, and findings were explained.  Despite presence of degenerative medial meniscus tear, based on her symptoms and physical examination findings, medial meniscus tear was not felt to be the main source of her pain.  We talked about maltracking of the patellofemoral joint and chondromalacia of the lateral facet.  With understanding of our discussion, this point she opted for aspiration and cortisone injection.  What to expect from the cortisone injection was explained.  We may try another injection in 6-8 weeks if partial relief is noted.  In the long run, we talked about potential benefit of stabilizing patella sleeve, strengthening of the VMO as well as weight loss.  All the questions were answered.      Large Joint Injection/Arthocentesis  Date/Time: 9/5/2018 9:47 AM  Performed by: [facility]  Authorized by: [facility]     Indications:  Pain and osteoarthritis  Needle Size:  22 G  Guidance: landmark guided    Approach:  Anteromedial  Location:  Knee  Site:  R knee joint  Medications:  8 mL lidocaine (PF) 1 %; 2 mL Dexamethasone Sodium Phosphate 120 MG/30ML  Outcome:  Tolerated well, no immediate complications  Procedure discussed: discussed risks, benefits, and alternatives    Consent Given by:  Patient  Timeout: timeout called immediately prior to procedure    Prep: patient was prepped and draped in usual sterile fashion              Imaging Interpretation:       Recent Results (from the past 744 hour(s))   MR Knee Right w/o  Contrast    Narrative    MR KNEE RIGHT WITHOUT CONTRAST September 4, 2018 8:10 AM    HISTORY: Knee effusion and pain. Acute pain of right knee.    TECHNIQUE: Axial and coronal T2 with fat suppression. Coronal T1.  Sagittal dual echo T2.    FINDINGS:   Medial Meniscus: Moderate extrusion of the body segment. Degenerative  tearing of the posterior horn with extension of both surfaces.       Lateral Meniscus: On series 7 image 10, there is a curvilinear area of  increased signal within the posterior horn; this extends outside of  the meniscus and therefore may well be artifactual. No articular  surface tear is seen otherwise.       Anterior Cruciate Ligament: Unremarkable. No sprain or tear  identified.     Posterior Cruciate Ligament: Unremarkable. No sprain or tear  identified.     Medial Collateral Ligament: No sprain or tear identified.    Lateral Collateral Ligament Complex, Popliteus Tendon: The iliotibial  band, fibular collateral ligament, biceps femoris tendon, and  popliteus tendon are intact.    Osseous and Cartilaginous Structures: There is very mild subchondral  edema of the medial tibial plateau. This is felt to represent reactive  edema since the patient did not describe a recent injury in order to  suggest bone contusion. Mild lateral patellar subluxation. Grade 2  chondromalacia of the patella. There is also chondromalacia of the  medial trochlea which appears to have focal grade 3 involvement.    Extensor Mechanism: The quadriceps and patellar tendons are intact.  The medial and lateral patellar retinacula appear unremarkable.    Joint Space: Synovitis and moderate-prominent joint effusion. At the  anterior aspect of the notch, there is an intra-articular density  measuring 1.3 x 0.8 x 0.5 cm. This could simply represent focal  synovitis or debris, but a loose body cannot be excluded.    Additional Findings: Minimal Baker's cyst. No semimembranosus-tibial  collateral ligament or pes anserine bursitis.  Moderate semimembranosus  tendinosis distally.         Impression    IMPRESSION:  1. Degenerative tearing of the posterior horn medial meniscus.  2. Patellofemoral chondromalacia. Mild lateral patellar subluxation.  3. Synovitis and moderate-prominent effusion. Loose body cannot be  excluded.  4. Minimal Baker's cyst.  5. Moderate semimembranosus tendinosis.    MD Jax HINDS MD  Department of Orthopedic Surgery        Disclaimer: This note consists of symbols derived from keyboarding, dictation and/or voice recognition software. As a result, there may be errors in the script t have gone undetected. Please consider this when interpreting information found in this chart.      Again, thank you for allowing me to participate in the care of your patient.        Sincerely,        Jax Epps MD

## 2018-09-05 NOTE — MR AVS SNAPSHOT
After Visit Summary   9/5/2018    Arabella Hart    MRN: [id number]           Patient Information     Date Of Birth          1965        Visit Information        Provider Department      9/5/2018 9:20 AM Jax Epps MD [facility] ORTHOPEDIC SURGERY        Today's Diagnoses     Effusion of knee joint right    -  1    Primary osteoarthritis of right knee          Care Instructions    Follow-up in 6 day weeks if pain persists          Follow-ups after your visit        Who to contact     If you have questions or need follow up information about today's clinic visit or your schedule please contact [facility] ORTHOPEDIC SURGERY directly at [phone].  Normal or non-critical lab and imaging results will be communicated to you by [employer], letter or phone within 4 business days after the clinic has received the results. If you do not hear from us within 7 days, please contact the clinic through [employer] or phone. If you have a critical or abnormal lab result, we will notify you by phone as soon as possible.  Submit refill requests through [facility] or call your pharmacy and they will forward the refill request to us. Please allow 3 business days for your refill to be completed.          Additional Information About Your Visit        MyChart Information     [facility] gives you secure access to your electronic health record. If you see a primary care provider, you can also send messages to your care team and make appointments. If you have questions, please call your primary care clinic.  If you do not have a primary care provider, please call [phone] and they will assist you.        Care EveryWhere ID     This is your Care EveryWhere ID. This could be used by other organizations to access your [facility] medical records  [id number]        Your Vitals Were     Last Period BMI (Body Mass Index)                08/12/2012 34.95 kg/m2           Blood Pressure from Last 3 Encounters:    09/05/18 100/62   08/02/18 126/74   06/22/18 118/76    Weight from Last 3 Encounters:   09/05/18 210 lb (95.3 kg)   06/22/18 207 lb (93.9 kg)   05/09/18 213 lb (96.6 kg)              We Performed the Following     Large Joint Injection/Arthocentesis        Primary Care Provider Office Phone # Fax #    Ely Calixto Dozier [phone] [address]  [facility] [place] [address]        Equal Access to Services     [facility]: Hadii aad ku hadasho Soomaali, waaxda luqadaha, qaybta kaalmada adeegyada, waxcarroll gutierres . So [facility] [phone].    ATENCIÓN: Si habla español, tiene a parikh disposición servicios gratuitos de asistencia lingüística. [facility] [phone].    We comply with applicable federal civil rights laws and [place] laws. We do not discriminate on the basis of race, color, national origin, age, disability, sex, sexual orientation, or gender identity.            Thank you!     Thank you for choosing [facility] ORTHOPEDIC SURGERY  for your care. Our goal is always to provide you with excellent care. Hearing back from our patients is one way we can continue to improve our services. Please take a few minutes to complete the written survey that you may receive in the mail after your visit with us. Thank you!             Your Updated Medication List - Protect others around you: Learn how to safely use, store and throw away your medicines at www.disposemymeds.org.          This list is accurate as of 9/5/18 10:02 AM.  Always use your most recent med list.                   Brand Name Dispense Instructions for use Diagnosis    ACETAMINOPHEN PO      Take 325-750 mg by mouth every 6 hours as needed.        ALLEGRA ALLERGY PO      Take  by mouth.        citalopram 40 MG tablet    celeXA    90 tablet    TAKE 1 TABLET (40 MG) BY MOUTH DAILY    Female climacteric       gabapentin 300 MG capsule    NEURONTIN    180 capsule    TAKE 2 CAPSULES (600 MG) BY MOUTH 3 TIMES  DAILY    Sciatic leg pain       indomethacin 50 MG capsule    INDOCIN    42 capsule    TAKE 1 CAPSULE (50 MG) BY MOUTH 3 TIMES DAILY (WITH MEALS)    Acute pain of right knee, Effusion of right knee       * levothyroxine 150 MCG tablet    SYNTHROID/LEVOTHROID    90 tablet    Take 1 tablet (150 mcg) by mouth daily    Hypothyroidism due to Hashimoto's thyroiditis       * levothyroxine 150 MCG tablet    SYNTHROID/LEVOTHROID    90 tablet    TAKE 1 TABLET (150 MCG) BY MOUTH DAILY    Hypothyroidism due to Hashimoto's thyroiditis       naproxen sodium 220 MG capsule      Take 220 mg by mouth 2 times daily (with meals)    Acute pain of right knee, Effusion of right knee       * Notice:  This list has 2 medication(s) that are the same as other medications prescribed for you. Read the directions carefully, and ask your doctor or other care provider to review them with you.

## 2018-09-06 ENCOUNTER — TELEPHONE (OUTPATIENT)
Dept: FAMILY MEDICINE | Facility: CLINIC | Age: 53
End: 2018-09-06

## 2018-09-06 NOTE — TELEPHONE ENCOUNTER
Panel Management Review      Patient has the following on her problem list: None      Composite cancer screening  Chart review shows that this patient is due/due soon for the following Mammogram and Colonoscopy  Summary:    Patient is due/failing the following:   COLONOSCOPY and MAMMOGRAM    Action needed:   colonoscopy and mammogram reminder    Type of outreach:    Sent Relypsat message.    Questions for provider review:    None                                                                                                                                    Saeid Barrios [facility]           Chart routed to none .

## 2018-09-15 ENCOUNTER — MYC MEDICAL ADVICE (OUTPATIENT)
Dept: ORTHOPEDICS | Facility: CLINIC | Age: 53
End: 2018-09-15

## 2018-09-17 NOTE — TELEPHONE ENCOUNTER
I would not recommend continuation of Indocin because of high risk of GI side effects.  If she has persisting issues, we should try another cortisone injection about 6 weeks from the last injection of September 5.

## 2018-09-24 DIAGNOSIS — M54.30 SCIATIC LEG PAIN: ICD-10-CM

## 2018-09-24 NOTE — TELEPHONE ENCOUNTER
Controlled Substance Refill Request for gabapentin (NEURONTIN) 300 MG capsule  Problem List Complete:  No     PROVIDER TO CONSIDER COMPLETION OF PROBLEM LIST AND OVERVIEW/CONTROLLED SUBSTANCE AGREEMENT    Last Written Prescription Date:  08/25/2018  Last Fill Quantity: 180 capsule,   # refills: 0    [place] Office Visit with [facility] [place] care provider: 08/02/2018    Future Office visit:     Controlled substance agreement on file: No.     Processing:  Fax Rx to [facility] pharmacy   checked in past 3 months?  Yes 08/25/2018      Gonzalez HERNANDEZT

## 2018-09-25 RX ORDER — GABAPENTIN 300 MG/1
CAPSULE ORAL
Qty: 180 CAPSULE | Refills: 1 | Status: SHIPPED | OUTPATIENT
Start: 2018-09-25 | End: 2019-01-11

## 2018-09-25 NOTE — TELEPHONE ENCOUNTER
RX monitoring program ([place]) reviewed:  reviewed- no concerns    [place] profile:  [email or web address]

## 2018-10-26 ENCOUNTER — TELEPHONE (OUTPATIENT)
Dept: FAMILY MEDICINE | Facility: CLINIC | Age: 53
End: 2018-10-26

## 2018-10-26 NOTE — LETTER
November 9, 2018      Arabella M Tanner  [address] [place] [address]        Dear Arabella,     Our records indicate that you may be due for preventative health care services.  Please make an appointment for a routine physical so we can help you set up the following tests as recommended.  If you have already had this testing done at another clinic please contact us to help us update our records to reflect the preventative care that you have had.    Breast cancer screen (mammogram) - Recommended every 1-2 years for women age 50 and older. Mammograms help detect breast cancer, which is the most common cancer among women in the [place]. You may need to start having mammograms earlier and more often if you have had breast cancer, breast problems, or have a family history of breast cancer.                                                                                                                                    Colon cancer screen (colonoscopy) - Recommended every ten years for everyone age 50 and older. Screening is done by a colonoscopy every 10 years starting at age 50 or earlier if you have a family history of colon cancer.  If you cannot or do not want to have a colonoscopy you can opt to do an annual fecal occult blood immunoassay test (FIT stool test). We strongly urge our patients to consider having a colonoscopy done, which is the best screening test available and only needs to be done every 10 years if normal.                                                                                                                                              Thank you for choosing [facility]. We appreciate the opportunity to serve you and look forward to supporting your healthcare needs in the future.    If you have any questions or concerns, please contact us at [phone].    Sincerely,        Ely Dozier MD

## 2018-10-26 NOTE — TELEPHONE ENCOUNTER
Panel Management Review      Patient has the following on her problem list: None      Composite cancer screening  Chart review shows that this patient is due/due soon for the following Mammogram and Colonoscopy  Summary:    Patient is due/failing the following:   COLONOSCOPY and MAMMOGRAM    Action needed:   Patient needs office visit for physical and set up mammogram and colonoscopy.    Type of outreach:    Phone, left message for patient to call back.     Questions for provider review:    None                                                                                                                                    Saeid Barrios [facility]           Chart routed to Care Team .

## 2018-12-11 ENCOUNTER — HOSPITAL ENCOUNTER (OUTPATIENT)
Facility: CLINIC | Age: 53
Discharge: HOME | End: 2018-12-11
Attending: INTERNAL MEDICINE | Admitting: INTERNAL MEDICINE
Payer: COMMERCIAL

## 2018-12-11 VITALS
SYSTOLIC BLOOD PRESSURE: 104 MMHG | WEIGHT: 200 LBS | OXYGEN SATURATION: 95 % | HEIGHT: 66 IN | BODY MASS INDEX: 32.14 KG/M2 | DIASTOLIC BLOOD PRESSURE: 70 MMHG | RESPIRATION RATE: 19 BRPM

## 2018-12-11 LAB — COLONOSCOPY: NORMAL

## 2018-12-11 PROCEDURE — G0500 MOD SEDAT ENDO SERVICE >5YRS: HCPCS | Performed by: INTERNAL MEDICINE

## 2018-12-11 PROCEDURE — 25000128 H RX IP 250 OP 636: Performed by: INTERNAL MEDICINE

## 2018-12-11 PROCEDURE — G0121 COLON CA SCRN NOT HI RSK IND: HCPCS | Performed by: INTERNAL MEDICINE

## 2018-12-11 PROCEDURE — 45378 DIAGNOSTIC COLONOSCOPY: CPT | Performed by: INTERNAL MEDICINE

## 2018-12-11 RX ORDER — FLUMAZENIL 0.1 MG/ML
0.2 INJECTION, SOLUTION INTRAVENOUS
Status: DISCONTINUED | OUTPATIENT
Start: 2018-12-11 | End: 2018-12-11 | Stop reason: HOSPADM

## 2018-12-11 RX ORDER — NALOXONE HYDROCHLORIDE 0.4 MG/ML
.1-.4 INJECTION, SOLUTION INTRAMUSCULAR; INTRAVENOUS; SUBCUTANEOUS
Status: DISCONTINUED | OUTPATIENT
Start: 2018-12-11 | End: 2018-12-11 | Stop reason: HOSPADM

## 2018-12-11 RX ORDER — ONDANSETRON 2 MG/ML
4 INJECTION INTRAMUSCULAR; INTRAVENOUS EVERY 6 HOURS PRN
Status: DISCONTINUED | OUTPATIENT
Start: 2018-12-11 | End: 2018-12-11 | Stop reason: HOSPADM

## 2018-12-11 RX ORDER — ONDANSETRON 4 MG/1
4 TABLET, ORALLY DISINTEGRATING ORAL EVERY 6 HOURS PRN
Status: DISCONTINUED | OUTPATIENT
Start: 2018-12-11 | End: 2018-12-11 | Stop reason: HOSPADM

## 2018-12-11 RX ORDER — LIDOCAINE 40 MG/G
CREAM TOPICAL
Status: DISCONTINUED | OUTPATIENT
Start: 2018-12-11 | End: 2018-12-11 | Stop reason: HOSPADM

## 2018-12-11 RX ORDER — FENTANYL CITRATE 50 UG/ML
INJECTION, SOLUTION INTRAMUSCULAR; INTRAVENOUS PRN
Status: DISCONTINUED | OUTPATIENT
Start: 2018-12-11 | End: 2018-12-11 | Stop reason: HOSPADM

## 2018-12-11 RX ORDER — ONDANSETRON 2 MG/ML
4 INJECTION INTRAMUSCULAR; INTRAVENOUS
Status: DISCONTINUED | OUTPATIENT
Start: 2018-12-11 | End: 2018-12-11 | Stop reason: HOSPADM

## 2018-12-11 ASSESSMENT — MIFFLIN-ST. JEOR: SCORE: 1538.94

## 2018-12-11 NOTE — H&P
Pre-Endoscopy History and Physical     Arabella Hart MRN# [id number]   YOB: 1965 Age: 53 year old     Date of Procedure: 12/11/2018  Primary care provider: Ely Dozier  Type of Endoscopy: Colonoscopy with possible biopsy, possible polypectomy  Reason for Procedure: screen  Type of Anesthesia Anticipated: Conscious Sedation    HPI:    Arabella is a 53 year old female who will be undergoing the above procedure.      A history and physical has been performed. The patient's medications and allergies have been reviewed. The risks and benefits of the procedure and the sedation options and risks were discussed with the patient.  All questions were answered and informed consent was obtained.      She denies a personal or family history of anesthesia complications or bleeding disorders.     Patient Active Problem List   Diagnosis     Allergic rhinitis     Uric acid kidney stones     ASCUS on Pap smear     S/P hysterectomy     Hypothyroidism due to Hashimoto's thyroiditis     Irritable bowel syndrome without diarrhea     NAGI (generalized anxiety disorder)     Menopausal flushing     Tobacco use disorder        Past Medical History:   Diagnosis Date     Abnormal Papanicolaou smear of cervix and cervical HPV     colpo done     ASCUS on Pap smear 12/20/06    + HPV     History of colposcopy with cervical biopsy 1/24/07    Atypical squamous metaplasia        Past Surgical History:   Procedure Laterality Date     C LIGATE FALLOPIAN TUBE,POSTPARTUM  1998    Tubal Ligation     C NONSPECIFIC PROCEDURE  1983    Right Ankle Surgery     LAPAROSCOPIC CHOLECYSTECTOMY N/A 6/4/2015    Procedure: LAPAROSCOPIC CHOLECYSTECTOMY;  Surgeon: Maria Fernanda Regalado MD;  Location: [facility] OR     LAPAROSCOPIC HYSTERECTOMY TOTAL, BILATERAL SALPINGO-OOPHORECTOMY, COMBINED  8/14/2012    Procedure: COMBINED LAPAROSCOPIC HYSTERECTOMY TOTAL, SALPINGO-OOPHORECTOMY;  LAPAROSCOPIC HYSTERECTOMY TOTAL, Bilateral SALPINGO-OOPHORECTOMY,  pelvic exam under anesthesia;  Surgeon: Jolene Baez MD;  Location: [facility] OR       Social History     Tobacco Use     Smoking status: Current Some Day Smoker     Packs/day: 0.50     Years: 15.00     Pack years: 7.50     Types: Cigarettes     Smokeless tobacco: Never Used   Substance Use Topics     Alcohol use: Yes     Comment: 1-2 drinks seldom       Family History   Problem Relation Age of Onset     Breast Cancer Paternal Grandmother      Thyroid Disease Mother         Hypothyroid     Cancer Mother         Ovarian - age 61     Coronary Artery Disease Paternal Grandfather      Hypertension Father         Heart complication after MI.       Cancer Father         Throat (Epiglottis) smoker     Heart Disease Father          of heart attack     Coronary Artery Disease Father      Breast Cancer Paternal Aunt         and P great aunt     Breast Cancer Paternal Aunt      Coronary Artery Disease Brother      Colon Cancer No family hx of        Prior to Admission medications    Medication Sig Start Date End Date Taking? Authorizing Provider   ACETAMINOPHEN PO Take 325-750 mg by mouth every 6 hours as needed.     Yes Reported, Patient   citalopram (CELEXA) 40 MG tablet TAKE 1 TABLET (40 MG) BY MOUTH DAILY 18  Yes Ely Dozier MD   Fexofenadine HCl (ALLEGRA ALLERGY PO) Take  by mouth.     Yes Reported, Patient   gabapentin (NEURONTIN) 300 MG capsule TAKE 2 CAPSULES (600 MG) BY MOUTH 3 TIMES DAILY 18  Yes Ely Dozier MD   levothyroxine (SYNTHROID/LEVOTHROID) 150 MCG tablet TAKE 1 TABLET (150 MCG) BY MOUTH DAILY 7/3/18  Yes Ely Dozier MD   levothyroxine (SYNTHROID/LEVOTHROID) 150 MCG tablet Take 1 tablet (150 mcg) by mouth daily 18  Yes Ely Dozier MD   naproxen sodium 220 MG capsule Take 220 mg by mouth 2 times daily (with meals)   Yes Reported, Patient       Allergies   Allergen Reactions     Ibuprofen Itching     Monosodium Glutamate      MSG=sinus  "headache     Percocet [Oxycodone-Acetaminophen]      Itching           REVIEW OF SYSTEMS:   5 point ROS negative except as noted above in HPI, including Gen., Resp., CV, GI &  system review.    PHYSICAL EXAM:   /68   Resp 13   Ht 1.676 m (5' 6\")   Wt 90.7 kg (200 lb)   LMP 08/12/2012   SpO2 96%   BMI 32.28 kg/m   Estimated body mass index is 32.28 kg/m  as calculated from the following:    Height as of this encounter: 1.676 m (5' 6\").    Weight as of this encounter: 90.7 kg (200 lb).   GENERAL APPEARANCE: alert, and oriented  MENTAL STATUS: alert  AIRWAY EXAM: Mallampatti Class I (visualization of the soft palate, fauces, uvula, anterior and posterior pillars)  RESP: lungs clear to auscultation - no rales, rhonchi or wheezes  CV: regular rates and rhythm  DIAGNOSTICS:    Not indicated    IMPRESSION   ASA Class 2 - Mild systemic disease    PLAN:   Plan for Colonoscopy with possible biopsy, possible polypectomy. We discussed the risks, benefits and alternatives and the patient wished to proceed.    The above has been forwarded to the consulting provider.      Signed Electronically by: Vishnu Mandujano  December 11, 2018          "

## 2018-12-12 ENCOUNTER — HOSPITAL ENCOUNTER (OUTPATIENT)
Dept: MAMMOGRAPHY | Facility: CLINIC | Age: 53
Discharge: HOME | End: 2018-12-12
Attending: FAMILY MEDICINE | Admitting: FAMILY MEDICINE
Payer: COMMERCIAL

## 2018-12-12 ENCOUNTER — OFFICE VISIT (OUTPATIENT)
Dept: INTERNAL MEDICINE | Facility: CLINIC | Age: 53
End: 2018-12-12
Payer: COMMERCIAL

## 2018-12-12 VITALS
SYSTOLIC BLOOD PRESSURE: 110 MMHG | DIASTOLIC BLOOD PRESSURE: 80 MMHG | TEMPERATURE: 98.8 F | HEART RATE: 84 BPM | RESPIRATION RATE: 24 BRPM | BODY MASS INDEX: 35.07 KG/M2 | WEIGHT: 210.5 LBS | HEIGHT: 65 IN | OXYGEN SATURATION: 95 %

## 2018-12-12 DIAGNOSIS — Z12.31 ENCOUNTER FOR SCREENING MAMMOGRAM FOR BREAST CANCER: ICD-10-CM

## 2018-12-12 DIAGNOSIS — G47.00 INSOMNIA, UNSPECIFIED TYPE: ICD-10-CM

## 2018-12-12 DIAGNOSIS — Z00.00 HEALTHCARE MAINTENANCE: Primary | ICD-10-CM

## 2018-12-12 PROCEDURE — 99396 PREV VISIT EST AGE 40-64: CPT | Performed by: NURSE PRACTITIONER

## 2018-12-12 PROCEDURE — 77067 SCR MAMMO BI INCL CAD: CPT

## 2018-12-12 PROCEDURE — 99213 OFFICE O/P EST LOW 20 MIN: CPT | Mod: 25 | Performed by: NURSE PRACTITIONER

## 2018-12-12 ASSESSMENT — ENCOUNTER SYMPTOMS
CONSTIPATION: 0
JOINT SWELLING: 1
FEVER: 0
SORE THROAT: 0
MYALGIAS: 1
ARTHRALGIAS: 1
PARESTHESIAS: 1
DIARRHEA: 0
DIZZINESS: 0
FREQUENCY: 0
NAUSEA: 0
PALPITATIONS: 0
HEARTBURN: 0
COUGH: 0
EYE PAIN: 0
DYSURIA: 0
BREAST MASS: 0
HEMATOCHEZIA: 0
CHILLS: 0
NERVOUS/ANXIOUS: 0
ABDOMINAL PAIN: 0
WEAKNESS: 0
HEMATURIA: 0
SHORTNESS OF BREATH: 0
HEADACHES: 0

## 2018-12-12 ASSESSMENT — MIFFLIN-ST. JEOR: SCORE: 1562.76

## 2018-12-12 ASSESSMENT — PATIENT HEALTH QUESTIONNAIRE - PHQ9: SUM OF ALL RESPONSES TO PHQ QUESTIONS 1-9: 5

## 2018-12-12 NOTE — PROGRESS NOTES
SUBJECTIVE:   CC: Arabella Hart is an 53 year old woman who presents for preventive health visit.     Physical   Annual:     Getting at least 3 servings of Calcium per day:  Yes    Dental care twice a year:  NO    Sleep apnea or symptoms of sleep apnea:  Daytime drowsiness    Diet:  Regular (no restrictions)    Frequency of exercise:  2-3 days/week    Duration of exercise:  15-30 minutes    Taking medications regularly:  Yes    Medication side effects:  None    PHQ-2 Total Score: 0          Insomnia      Duration: years    Description  Frequency of insomnia:  nightly  Time to fall asleep: 15 minutes  Middle of night awakening:  nightly  Early morning awakening:  occasionally    Accompanying signs and symptoms:  Works evening and AM shifts    History  Similar episodes in past:  YES  Previous evaluation/sleep study:  no     Precipitating or alleviating factors:  New stressful situation: no   Caffeine intake after lunchtime: no   OTC decongestants: no   Any new medications: no     Therapies tried and outcome: none      Today's PHQ-2 Score:   PHQ-2 ( 1999 Pf[employer]) 12/12/2018   Q1: Little interest or pleasure in doing things 0   Q2: Feeling down, depressed or hopeless 0   PHQ-2 Score 0   Q1: Little interest or pleasure in doing things Not at all   Q2: Feeling down, depressed or hopeless Not at all   PHQ-2 Score 0       Abuse: Current or Past(Physical, Sexual or Emotional)- No  Do you feel safe in your environment? Yes    Social History     Tobacco Use     Smoking status: Current Some Day Smoker     Packs/day: 0.50     Years: 15.00     Pack years: 7.50     Types: Cigarettes     Smokeless tobacco: Never Used   Substance Use Topics     Alcohol use: Yes     Comment: 1-2 drinks seldom     Alcohol Use 12/12/2018   If you drink alcohol do you typically have greater than 3 drinks per day OR greater than 7 drinks per week? No   No flowsheet data found.    Reviewed orders with patient.  Reviewed health maintenance and  updated orders accordingly - Yes  BP Readings from Last 3 Encounters:   18 110/80   18 104/70   18 100/62    Wt Readings from Last 3 Encounters:   18 95.5 kg (210 lb 8 oz)   18 90.7 kg (200 lb)   18 95.3 kg (210 lb)                  Patient Active Problem List   Diagnosis     Allergic rhinitis     Uric acid kidney stones     ASCUS on Pap smear     S/P hysterectomy     Hypothyroidism due to Hashimoto's thyroiditis     Irritable bowel syndrome without diarrhea     NAGI (generalized anxiety disorder)     Menopausal flushing     Tobacco use disorder     Past Surgical History:   Procedure Laterality Date     C LIGATE FALLOPIAN TUBE,POSTPARTUM      Tubal Ligation     C NONSPECIFIC PROCEDURE      Right Ankle Surgery     LAPAROSCOPIC CHOLECYSTECTOMY N/A 2015    Procedure: LAPAROSCOPIC CHOLECYSTECTOMY;  Surgeon: Maria Fernanda Regalado MD;  Location: [facility] OR     LAPAROSCOPIC HYSTERECTOMY TOTAL, BILATERAL SALPINGO-OOPHORECTOMY, COMBINED  2012    Procedure: COMBINED LAPAROSCOPIC HYSTERECTOMY TOTAL, SALPINGO-OOPHORECTOMY;  LAPAROSCOPIC HYSTERECTOMY TOTAL, Bilateral SALPINGO-OOPHORECTOMY, pelvic exam under anesthesia;  Surgeon: Jolene Baez MD;  Location: [facility] OR       Social History     Tobacco Use     Smoking status: Current Some Day Smoker     Packs/day: 0.50     Years: 15.00     Pack years: 7.50     Types: Cigarettes     Smokeless tobacco: Never Used   Substance Use Topics     Alcohol use: Yes     Comment: 1-2 drinks seldom     Family History   Problem Relation Age of Onset     Breast Cancer Paternal Grandmother      Thyroid Disease Mother         Hypothyroid     Cancer Mother         Ovarian - age 61     Coronary Artery Disease Paternal Grandfather      Hypertension Father         Heart complication after MI.       Cancer Father         Throat (Epiglottis) smoker     Heart Disease Father          of heart attack     Coronary Artery Disease Father       Breast Cancer Paternal Aunt         and P great aunt     Breast Cancer Paternal Aunt      Coronary Artery Disease Brother      Colon Cancer No family hx of          Current Outpatient Medications   Medication Sig Dispense Refill     ACETAMINOPHEN PO Take 325-750 mg by mouth every 6 hours as needed.         citalopram (CELEXA) 40 MG tablet TAKE 1 TABLET (40 MG) BY MOUTH DAILY 90 tablet 3     Fexofenadine HCl (ALLEGRA ALLERGY PO) Take  by mouth.         gabapentin (NEURONTIN) 300 MG capsule TAKE 2 CAPSULES (600 MG) BY MOUTH 3 TIMES DAILY 180 capsule 1     levothyroxine (SYNTHROID/LEVOTHROID) 150 MCG tablet Take 1 tablet (150 mcg) by mouth daily 90 tablet 3     naproxen sodium 220 MG capsule Take 220 mg by mouth 2 times daily (with meals)         Mammogram Screening: Patient over age 50, mutual decision to screen reflected in health maintenance.    Pertinent mammograms are reviewed under the imaging tab.  History of abnormal Pap smear: Status post benign hysterectomy. Health Maintenance and Surgical History updated.  PAP / HPV Latest Ref Rng & Units 1/22/2016 4/8/2014 7/11/2012   PAP - NIL NIL HSIL(A)   HPV 16 DNA NEG Negative - -   HPV 18 DNA NEG Negative - -   OTHER HR HPV NEG Negative - -     Reviewed and updated as needed this visit by clinical staff         Reviewed and updated as needed this visit by Provider            Review of Systems   Constitutional: Negative for chills and fever.   HENT: Negative for congestion, ear pain, hearing loss and sore throat.    Eyes: Negative for pain and visual disturbance.   Respiratory: Negative for cough and shortness of breath.    Cardiovascular: Negative for chest pain, palpitations and peripheral edema.   Gastrointestinal: Negative for abdominal pain, constipation, diarrhea, heartburn, hematochezia and nausea.   Breasts:  Negative for tenderness, breast mass and discharge.   Genitourinary: Negative for dysuria, frequency, genital sores, hematuria, pelvic pain, urgency,  "vaginal bleeding and vaginal discharge.   Musculoskeletal: Positive for arthralgias, joint swelling and myalgias.   Skin: Negative for rash.   Neurological: Positive for paresthesias. Negative for dizziness, weakness and headaches.   Psychiatric/Behavioral: Negative for mood changes.           OBJECTIVE:   LMP 08/12/2012    /80   Pulse 84   Temp 98.8  F (37.1  C) (Oral)   Resp 24   Ht 1.638 m (5' 4.5\")   Wt 95.5 kg (210 lb 8 oz)   LMP 08/12/2012   SpO2 95%   BMI 35.57 kg/m      Physical Exam  GENERAL: alert, no distress and obese  NECK: no adenopathy, no asymmetry, masses, or scars and thyroid normal to palpation  RESP: lungs clear to auscultation - no rales, rhonchi or wheezes  CV: regular rate and rhythm, normal S1 S2, no S3 or S4, no murmur, click or rub, no peripheral edema and peripheral pulses strong  ABDOMEN: soft, nontender, no hepatosplenomegaly, no masses and bowel sounds normal  MS: no gross musculoskeletal defects noted, no edema  NEURO: Normal strength and tone, mentation intact and speech normal  PSYCH: mentation appears normal, affect normal/bright        ASSESSMENT/PLAN:       (Z00.00) Healthcare maintenance  (primary encounter diagnosis)  Comment:   Plan:     (G47.00) Insomnia, unspecified type  Comment:   Plan: SLEEP EVALUATION & MANAGEMENT REFERRAL - [facility]          [phone] (Age 18 and up)                COUNSELING:  Reviewed preventive health counseling, as reflected in patient instructions    BP Readings from Last 1 Encounters:   12/11/18 104/70     Estimated body mass index is 32.28 kg/m  as calculated from the following:    Height as of 12/11/18: 1.676 m (5' 6\").    Weight as of 12/11/18: 90.7 kg (200 lb).      Weight management plan: Discussed healthy diet and exercise guidelines     reports that she has been smoking cigarettes.  She has a 7.50 pack-year smoking history. she has never used smokeless tobacco.  Tobacco Cessation " Action Plan: Information offered: Patient not interested at this time    Counseling Resources:  ATP IV Guidelines  Pooled Cohorts Equation Calculator  Breast Cancer Risk Calculator  FRAX Risk Assessment  ICSI Preventive Guidelines  Dietary Guidelines for Americans, 2010  USDA's MyPlate  ASA Prophylaxis  Lung CA Screening    Ladi López NP  [facility]

## 2018-12-14 ENCOUNTER — OFFICE VISIT (OUTPATIENT)
Dept: FAMILY MEDICINE | Facility: CLINIC | Age: 53
End: 2018-12-14
Payer: COMMERCIAL

## 2018-12-14 VITALS
BODY MASS INDEX: 34.95 KG/M2 | SYSTOLIC BLOOD PRESSURE: 122 MMHG | OXYGEN SATURATION: 94 % | DIASTOLIC BLOOD PRESSURE: 80 MMHG | HEART RATE: 79 BPM | WEIGHT: 209.8 LBS | TEMPERATURE: 97.9 F | HEIGHT: 65 IN

## 2018-12-14 DIAGNOSIS — L02.219 CELLULITIS AND ABSCESS OF TRUNK: ICD-10-CM

## 2018-12-14 DIAGNOSIS — L03.319 CELLULITIS OF TRUNK, UNSPECIFIED SITE OF TRUNK: Primary | ICD-10-CM

## 2018-12-14 DIAGNOSIS — L03.319 CELLULITIS AND ABSCESS OF TRUNK: ICD-10-CM

## 2018-12-14 PROCEDURE — 10060 I&D ABSCESS SIMPLE/SINGLE: CPT | Performed by: PHYSICIAN ASSISTANT

## 2018-12-14 RX ORDER — SULFAMETHOXAZOLE/TRIMETHOPRIM 800-160 MG
1 TABLET ORAL 2 TIMES DAILY
Qty: 20 TABLET | Refills: 0 | Status: SHIPPED | OUTPATIENT
Start: 2018-12-14 | End: 2018-12-24

## 2018-12-14 ASSESSMENT — PATIENT HEALTH QUESTIONNAIRE - PHQ9: SUM OF ALL RESPONSES TO PHQ QUESTIONS 1-9: 5

## 2018-12-14 ASSESSMENT — MIFFLIN-ST. JEOR: SCORE: 1559.59

## 2018-12-14 NOTE — PROGRESS NOTES
"  SUBJECTIVE:   Arabella Hart is a 53 year old female who presents to clinic today for the following health issues:      Cyst under right breast    {additional problems for provider to add:360381}    Problem list and histories reviewed & adjusted, as indicated.  Additional history: {NONE - AS DOCUMENTED:562206::\"as documented\"}    {HIST REVIEW/ LINKS 2:108508}    Reviewed and updated as needed this visit by clinical staff  Tobacco  Allergies  Meds  Med Hx  Surg Hx  Fam Hx  Soc Hx      Reviewed and updated as needed this visit by Provider         {PROVIDER CHARTING PREFERENCE:322584}  "

## 2018-12-14 NOTE — PATIENT INSTRUCTIONS
(L03.971) Cellulitis of trunk, unspecified site of trunk  (primary encounter diagnosis)  Comment:   Plan: sulfamethoxazole-trimethoprim (BACTRIM         DS/SEPTRA DS) 800-160 MG tablet          Please follow-up in 3 days. Sooner if worsening pain , spreading erythema, fever.        Patient Education     Discharge Instructions for Cellulitis  You have been diagnosed with cellulitis. This is an infection in the deepest layer of the skin. In some cases, the infection also affects the muscle. Cellulitis is caused by bacteria. The bacteria can enter the body through broken skin. This can happen with a cut, scratch, animal bite, or an insect bite that has been scratched. You may have been treated in the hospital with antibiotics and fluids. You will likely be given a prescription for antibiotics to take at home. This sheet will help you take care of yourself at home.  Home care  When you are home:    Take the prescribed antibiotic medicine you are given as directed until it is gone. Take it even if you feel better. It treats the infection and stops it from returning. Not taking all the medicine can make future infections hard to treat.    Keep the infected area clean.    When possible, raise the infected area above the level of your heart. This helps keep swelling down.    Talk with your healthcare provider if you are in pain. Ask what kind of over-the-counter medicine you can take for pain.    Apply clean bandages as advised.    Take your temperature once a day for a week.    Wash your hands often to prevent spreading the infection.  In the future, wash your hands before and after you touch cuts, scratches, or bandages. This will help prevent infection.   When to call your healthcare provider  Call your healthcare provider immediately if you have any of the following:    Difficulty or pain when moving the joints above or below the infected area    Discharge or pus draining from the area    Fever of 100.4 F (38 C) or  higher, or as directed by your healthcare provider    Pain that gets worse in or around the infected     Redness that gets worse in or around the infected area, particularly if the area of redness expands to a wider area    Shaking chills    Swelling of the infected area    Vomiting   Date Last Reviewed: 8/1/2016 2000-2018 [employer]. [address] [place] [id number]. All rights reserved. This information is not intended as a substitute for professional medical care. Always follow your healthcare professional's instructions.

## 2018-12-14 NOTE — PROGRESS NOTES
Subjective: Arabella Hart a 53 year old female who presents today for boil on right breastl. The lesion(s) is/are located on the breast, number 2 and measures 3cm She The patient reports the lesion is painfull and denies other significant symptoms on ROS. Medications reviewed.    Pause for the cause has been completed prior to the prceedure.   1. Arabella was identified by both name and date of birth   2. The correct site was identified   3. Site was marked by provider    4. Written informed consent correct and signed or verbal authorization  to proceed was obtained   5. Verifed necessary supplies, equipment, and diagnostics are available    6. Time out was performed immediately prior to procedure    Objective: The lesion(s) is/are of the above mentioned size and location and is/are purulent and erythematous. The area was prepped and appropriately anesthetized. Using the usual technique, incision and drainage of abscess was performed. An appropriate dressing was applied. The procedure was well tolerated and without complications.     Assessment: skin abscess    Plan: Follow up: The patient may return prn.. Wound care instructions provided.  Patient was instructed to be alert for any signs of cutaneous infection.     Arabella was started on bactrim bid x 10 days.  Well follow-up if symptoms worsen over the next few days.

## 2019-01-11 DIAGNOSIS — M54.30 SCIATIC LEG PAIN: ICD-10-CM

## 2019-01-11 RX ORDER — GABAPENTIN 300 MG/1
CAPSULE ORAL
Qty: 180 CAPSULE | Refills: 1 | Status: SHIPPED | OUTPATIENT
Start: 2019-01-11 | End: 2019-05-22

## 2019-01-11 NOTE — TELEPHONE ENCOUNTER
RX monitoring program ([place]MP) reviewed:  reviewed- no concerns    [place] profile:  [email or web address]

## 2019-01-11 NOTE — TELEPHONE ENCOUNTER
Controlled Substance Refill Request for gabapentin (NEURONTIN) 300 MG capsule  Problem List Complete:  No     PROVIDER TO CONSIDER COMPLETION OF PROBLEM LIST AND OVERVIEW/CONTROLLED SUBSTANCE AGREEMENT    Last Written Prescription Date:  9/25/18  Last Fill Quantity: 180 CAPSULE,   # refills: 1    THE MOST RECENT OFFICE VISIT MUST BE WITHIN THE PAST 3 MONTHS. (AT LEAST ONE FACE TO FACE VISIT MUST OCCUR EVERY 6 MONTHS. ADDITIONAL VISITS CAN BE VIRTUAL.)    Last Office Visit with [facility] primary care provider: 12/14/2018 WITH AASEBY-AGUILERA    Future Office visit:     Controlled substance agreement: [unfilled]    Last Urine Drug Screen: No results found for: CDAUT, No results found for: COMDAT, No results found for: THC13, PCP13, COC13, MAMP13, OPI13, AMP13, BZO13, TCA13, MTD13, BAR13, OXY13, PPX13, BUP13     Processing:  Fax Rx to [facility] pharmacy     [email or web address]       checked in past 3 months?  No, route to RN

## 2019-02-05 ENCOUNTER — TELEPHONE (OUTPATIENT)
Dept: FAMILY MEDICINE | Facility: CLINIC | Age: 54
End: 2019-02-05

## 2019-02-05 NOTE — TELEPHONE ENCOUNTER
Panel Management Review      Patient has the following on her problem list: None      Composite cancer screening  Chart review shows that this patient is due/due soon for the following None  Summary:    Patient is due/failing the following:   None at this time as pt has recently had her colonoscopy.     Action needed:   none    Type of outreach:    none    Questions for provider review:    None                                                                                                                                    Saeid Barrios [facility]           Chart routed to none .

## 2019-04-25 ENCOUNTER — ANCILLARY PROCEDURE (OUTPATIENT)
Dept: GENERAL RADIOLOGY | Facility: CLINIC | Age: 54
End: 2019-04-25
Attending: PHYSICIAN ASSISTANT
Payer: COMMERCIAL

## 2019-04-25 ENCOUNTER — OFFICE VISIT (OUTPATIENT)
Dept: ORTHOPEDICS | Facility: CLINIC | Age: 54
End: 2019-04-25
Payer: COMMERCIAL

## 2019-04-25 VITALS — BODY MASS INDEX: 37.15 KG/M2 | SYSTOLIC BLOOD PRESSURE: 122 MMHG | WEIGHT: 219.8 LBS | DIASTOLIC BLOOD PRESSURE: 70 MMHG

## 2019-04-25 DIAGNOSIS — M17.11 PRIMARY OSTEOARTHRITIS OF RIGHT KNEE: ICD-10-CM

## 2019-04-25 DIAGNOSIS — M25.562 LEFT KNEE PAIN, UNSPECIFIED CHRONICITY: ICD-10-CM

## 2019-04-25 DIAGNOSIS — M25.562 LEFT KNEE PAIN, UNSPECIFIED CHRONICITY: Primary | ICD-10-CM

## 2019-04-25 DIAGNOSIS — M17.12 PRIMARY OSTEOARTHRITIS OF LEFT KNEE: ICD-10-CM

## 2019-04-25 PROCEDURE — 20610 DRAIN/INJ JOINT/BURSA W/O US: CPT | Mod: 50 | Performed by: PHYSICIAN ASSISTANT

## 2019-04-25 PROCEDURE — 73562 X-RAY EXAM OF KNEE 3: CPT | Mod: LT

## 2019-04-25 RX ORDER — LIDOCAINE HYDROCHLORIDE 10 MG/ML
5 INJECTION, SOLUTION INFILTRATION; PERINEURAL
Status: DISCONTINUED | OUTPATIENT
Start: 2019-04-25 | End: 2019-07-10

## 2019-04-25 RX ORDER — TESTOSTERONE CYPIONATE 200 MG/ML
2 INJECTION INTRAMUSCULAR
Status: DISCONTINUED | OUTPATIENT
Start: 2019-04-25 | End: 2019-07-10

## 2019-04-25 RX ADMIN — LIDOCAINE HYDROCHLORIDE 5 ML: 10 INJECTION, SOLUTION INFILTRATION; PERINEURAL at 11:19

## 2019-04-25 RX ADMIN — LIDOCAINE HYDROCHLORIDE 5 ML: 10 INJECTION, SOLUTION INFILTRATION; PERINEURAL at 11:17

## 2019-04-25 RX ADMIN — TESTOSTERONE CYPIONATE 2 ML: 200 INJECTION INTRAMUSCULAR at 11:19

## 2019-04-25 RX ADMIN — TESTOSTERONE CYPIONATE 2 ML: 200 INJECTION INTRAMUSCULAR at 11:17

## 2019-04-25 NOTE — PATIENT INSTRUCTIONS
1. Left knee pain, unspecified chronicity    2. Primary osteoarthritis of right knee    3. Primary osteoarthritis of left knee      Steroid injection of the bilateral knee was performed today in clinic    - Would not soak in a hot tub, bath or swimming pool for 48 hours  - Ok to shower  - Ice today and only do your normal amounts of activity  - The lidocaine (what is giving you pain relief right now) will likely stop working in 1-2 hours.  You will then have pain again, similar to before you received the injection. The corticosteroid will not start working until approximately 1-2 weeks from now.  In a small percentage of people, cortisone can cause flushing/redness in the face. This usually lasts for 1-3 days and resolves. Cool compress and Ibuprofen/Tylenol can help if this happens.  Can repeat the injection anytime after 4 months    Follow up for repeat injection when pain returns.

## 2019-04-25 NOTE — PROGRESS NOTES
HISTORY OF PRESENT ILLNESS:    Arabella Hart is a 53 year old female who is seen in follow-up for right knee pain, and evaluation of left knee pain. 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 She notes slow return of knee pain over the last month. No new injury since last office visit. She notes 1 fall over the winter where she was walking laterally along her car, and fell backwards into a snow bank. She denies any pain to her knees after her slip.     Patient notes slow return of pain in the right knee after Cortisione injection in 9/2018, and onset of left knee pain.  She notes increased aching in the knees.  Patient states pain is in the entire joint, (she held her hands across her knee caps). Patient reports varied symptoms throughout the day, issues climbing stairs with a load (laundry) or related to weather.  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  Main complaints is pain at nighttime with difficulties falling and staying asleep, as she cannot stay in one position.  She notes no new symptoms in either the right or left knee since last office visit. She has continued mild swelling in both knees, she has cracking and popping that do not increase her pain.  No changes with ROM, and no weakness.  Patient denies radiating pain, locking, numbness, and tingling.  Current pain: 2/10, Pain at night time 8/10.  Patient reports pain is better with heat, ice, and moving throughout the day.  She reports worsening of pain at nighttime.  Treatments: Cortisone injection of the right knee: 9/5/18, Naproxen PRN, (Ibp allergy), Tylenol, Gabapentin, heat, ice, elevation.   Orthopedic PMH: right sided sciatica, right knee osteoarthritis    Past Medical History:   Diagnosis Date     Abnormal Papanicolaou smear of cervix and cervical HPV     colpo done     ASCUS on Pap smear 12/20/06    + HPV     History of colposcopy with cervical  biopsy 07    Atypical squamous metaplasia       Past Surgical History:   Procedure Laterality Date     C LIGATE FALLOPIAN TUBE,POSTPARTUM      Tubal Ligation     C NONSPECIFIC PROCEDURE      Right Ankle Surgery     COLONOSCOPY N/A 2018    Procedure: COLONOSCOPY;  Surgeon: Vishnu Mandujano MD;  Location: [facility]     LAPAROSCOPIC CHOLECYSTECTOMY N/A 2015    Procedure: LAPAROSCOPIC CHOLECYSTECTOMY;  Surgeon: Maria Fernanda Regalado MD;  Location: [facility] OR     LAPAROSCOPIC HYSTERECTOMY TOTAL, BILATERAL SALPINGO-OOPHORECTOMY, COMBINED  2012    Procedure: COMBINED LAPAROSCOPIC HYSTERECTOMY TOTAL, SALPINGO-OOPHORECTOMY;  LAPAROSCOPIC HYSTERECTOMY TOTAL, Bilateral SALPINGO-OOPHORECTOMY, pelvic exam under anesthesia;  Surgeon: Jolene Baez MD;  Location: [facility] OR       Family History   Problem Relation Age of Onset     Breast Cancer Paternal Grandmother      Thyroid Disease Mother         Hypothyroid     Cancer Mother         Ovarian - age 61     Coronary Artery Disease Paternal Grandfather      Hypertension Father         Heart complication after MI.       Cancer Father         Throat (Epiglottis) smoker     Heart Disease Father          of heart attack     Coronary Artery Disease Father      Breast Cancer Paternal Aunt         and P great aunt     Breast Cancer Paternal Aunt      Coronary Artery Disease Brother      Colon Cancer No family hx of        Social History     Socioeconomic History     Marital status:      Spouse name: Not on file     Number of children: Not on file     Years of education: Not on file     Highest education level: Not on file   Occupational History     Not on file   Social Needs     Financial resource strain: Not on file     Food insecurity:     Worry: Not on file     Inability: Not on file     Transportation needs:     Medical: Not on file     Non-medical: Not on file   Tobacco Use     Smoking status: Current Some Day Smoker     Packs/day:  0.50     Years: 15.00     Pack years: 7.50     Types: Cigarettes     Smokeless tobacco: Never Used   Substance and Sexual Activity     Alcohol use: Yes     Comment: 1-2 drinks seldom     Drug use: No     Sexual activity: Yes     Partners: Male     Birth control/protection: Female Surgical     Comment: hysterectomy   Lifestyle     Physical activity:     Days per week: Not on file     Minutes per session: Not on file     Stress: Not on file   Relationships     Social connections:     Talks on phone: Not on file     Gets together: Not on file     Attends Oriental orthodox service: Not on file     Active member of club or organization: Not on file     Attends meetings of clubs or organizations: Not on file     Relationship status: Not on file     Intimate partner violence:     Fear of current or ex partner: Not on file     Emotionally abused: Not on file     Physically abused: Not on file     Forced sexual activity: Not on file   Other Topics Concern     Parent/sibling w/ CABG, MI or angioplasty before 65F 55M? Not Asked   Social History Narrative     Not on file       Current Outpatient Medications   Medication Sig Dispense Refill     ACETAMINOPHEN PO Take 325-750 mg by mouth every 6 hours as needed.         citalopram (CELEXA) 40 MG tablet TAKE 1 TABLET (40 MG) BY MOUTH DAILY 90 tablet 3     Fexofenadine HCl (ALLEGRA ALLERGY PO) Take  by mouth.         gabapentin (NEURONTIN) 300 MG capsule TAKE 2 CAPSULES (600 MG) BY MOUTH 3 TIMES DAILY 180 capsule 1     levothyroxine (SYNTHROID/LEVOTHROID) 150 MCG tablet Take 1 tablet (150 mcg) by mouth daily 90 tablet 3     naproxen sodium 220 MG capsule Take 220 mg by mouth 2 times daily (with meals)         Allergies   Allergen Reactions     Ibuprofen Itching     Monosodium Glutamate      MSG=sinus headache     Oxycodone        Patient's past medical, surgical, social and family histories are reviewed today.  There are no significant contributory medical issues.    REVIEW OF  SYSTEMS:  CONSTITUTIONAL:  NEGATIVE for fever, chills, change in weight  INTEGUMENTARY/SKIN:  NEGATIVE for worrisome rashes, moles or lesions  EYES:  NEGATIVE for vision changes or irritation  ENT/MOUTH:  NEGATIVE for ear, mouth and throat problems  RESP:  NEGATIVE for significant cough or SOB  BREAST:  NEGATIVE for masses, tenderness or discharge  CV:  NEGATIVE for chest pain, palpitations or peripheral edema  GI:  NEGATIVE for nausea, abdominal pain, heartburn, or change in bowel habits  :  Negative   MUSCULOSKELETAL:  See HPI above  NEURO:  NEGATIVE for weakness, dizziness or paresthesias  ENDOCRINE:  NEGATIVE for temperature intolerance, skin/hair changes  HEME/ALLERGY/IMMUNE:  NEGATIVE for bleeding problems  PSYCHIATRIC:  NEGATIVE for changes in mood or affect    PHYSICAL EXAM:  /70 (BP Location: Left arm, Patient Position: Chair, Cuff Size: Adult Large)   Wt 99.7 kg (219 lb 12.8 oz)   LMP 08/12/2012   BMI 37.15 kg/m    Body mass index is 37.15 kg/m .   GENERAL APPEARANCE: healthy, well nourished. Obese.   NEURO: She is alert and oriented x 3, mentation intact and speech normal  POSTURE: Stands with normal weight bearing line.  PSYCH:  mentation appears normal and affect normal/bright    MUSCULOSKELETAL: Examination of Bilateral knees.  GROSS OBSERVATION:  Notable effusion, no significant deformities or lesions  PALPATION: Pain and crepitus at patella with pressure bilaterally, mild effusion, no warmth, joint line pain.  ROM:  Right:  0-120+.  Left: 0-120+  LIGAMENTOUS:  Intact bilaterall.  STRENGTH:  5/5 flex, ext bilat.  SPECIAL TESTS:  McMurrays negative, patella grind mild pain.    SKIN: as noted., no lesions, erythema noted bilaterally.  VASCULATURE:  Good capillary refill bilaterally.  SENSATION:  Otherwise no gross deficits noted.   COORDINATION:  Able to move joint within above stated ROM with good control.    Imaging Interpretation:       Recent Results (from the past 24 hour(s))   XR Knee  Left 3 Views    Narrative    XR KNEE LT 3 VW 4/25/2019 10:52 AM     HISTORY: Left knee pain, unspecified chronicity      Impression    IMPRESSION: Negative exam.    LUKAS CEDENO MD         MR KNEE RIGHT WITHOUT CONTRAST September 4, 2018 8:10 AM     HISTORY: Knee effusion and pain. Acute pain of right knee.     TECHNIQUE: Axial and coronal T2 with fat suppression. Coronal T1.  Sagittal dual echo T2.     FINDINGS:   Medial Meniscus: Moderate extrusion of the body segment. Degenerative  tearing of the posterior horn with extension of both surfaces.        Lateral Meniscus: On series 7 image 10, there is a curvilinear area of  increased signal within the posterior horn; this extends outside of  the meniscus and therefore may well be artifactual. No articular  surface tear is seen otherwise.        Anterior Cruciate Ligament: Unremarkable. No sprain or tear  identified.      Posterior Cruciate Ligament: Unremarkable. No sprain or tear  identified.      Medial Collateral Ligament: No sprain or tear identified.     Lateral Collateral Ligament Complex, Popliteus Tendon: The iliotibial  band, fibular collateral ligament, biceps femoris tendon, and  popliteus tendon are intact.     Osseous and Cartilaginous Structures: There is very mild subchondral  edema of the medial tibial plateau. This is felt to represent reactive  edema since the patient did not describe a recent injury in order to  suggest bone contusion. Mild lateral patellar subluxation. Grade 2  chondromalacia of the patella. There is also chondromalacia of the  medial trochlea which appears to have focal grade 3 involvement.     Extensor Mechanism: The quadriceps and patellar tendons are intact.  The medial and lateral patellar retinacula appear unremarkable.     Joint Space: Synovitis and moderate-prominent joint effusion. At the  anterior aspect of the notch, there is an intra-articular density  measuring 1.3 x 0.8 x 0.5 cm. This could simply represent  focal  synovitis or debris, but a loose body cannot be excluded.     Additional Findings: Minimal Baker's cyst. No semimembranosus-tibial  collateral ligament or pes anserine bursitis. Moderate semimembranosus  tendinosis distally.                                                                   IMPRESSION:  1. Degenerative tearing of the posterior horn medial meniscus.  2. Patellofemoral chondromalacia. Mild lateral patellar subluxation.  3. Synovitis and moderate-prominent effusion. Loose body cannot be  excluded.  4. Minimal Baker's cyst.  5. Moderate semimembranosus tendinosis.     EDMAR RENNER MD    --    XR KNEE RT 3 VW  8/2/2018 8:16 AM     HISTORY:  Acute right knee pain.     COMPARISON:  None.                                                                      IMPRESSION:  No osseous abnormalities. There may be a small joint  effusion.      KINZA REDDY MD     ASSESSMENT:  1. Left knee pain, unspecified chronicity    2. Primary osteoarthritis of right knee    3. Primary osteoarthritis of left knee      Appears mainly Patello Femoral.    PLAN:    1.  Cortisone injection bilateral knees.  2.  Naproxen 440 mg BID for symptoms.    Follow up in clinic in PRN.     Large Joint Injection/Arthocentesis: R knee joint  Date/Time: 4/25/2019 11:17 AM  Performed by: Héctor Simms PA-C  Authorized by: Héctor Simms PA-C     Indications:  Pain and osteoarthritis  Needle Size:  22 G  Guidance: landmark guided    Approach:  Anterolateral  Location:  Knee      Medications:  16 mg hylan 16 MG/2ML; 2 mL dexamethasone 120 MG/30ML; 5 mL lidocaine 1 %  Outcome:  Tolerated well, no immediate complications  Procedure discussed: discussed risks, benefits, and alternatives    Consent Given by:  Patient  Timeout: timeout called immediately prior to procedure    Prep: patient was prepped and draped in usual sterile fashion    Large Joint Injection/Arthocentesis: L knee joint  Date/Time: 4/25/2019 11:19 AM  Performed  by: Héctor Simms PA-C  Authorized by: Héctor Simms PA-C     Indications:  Pain and osteoarthritis  Needle Size:  22 G  Guidance: landmark guided    Approach:  Anterolateral  Location:  Knee      Medications:  2 mL dexamethasone 120 MG/30ML; 5 mL lidocaine 1 %  Outcome:  Tolerated well, no immediate complications  Procedure discussed: discussed risks, benefits, and alternatives    Consent Given by:  Patient  Timeout: timeout called immediately prior to procedure    Prep: patient was prepped and draped in usual sterile fashion            Héctor Simms PA-C  [facility] - Surgery

## 2019-05-22 DIAGNOSIS — M54.30 SCIATIC LEG PAIN: ICD-10-CM

## 2019-05-22 RX ORDER — GABAPENTIN 300 MG/1
CAPSULE ORAL
Qty: 90 CAPSULE | Refills: 0 | Status: SHIPPED | OUTPATIENT
Start: 2019-05-22 | End: 2019-07-11

## 2019-05-22 NOTE — TELEPHONE ENCOUNTER
Controlled Substance Refill Request for gabapentin (NEURONTIN) 300 MG capsule  Problem List Complete:  No     PROVIDER TO CONSIDER COMPLETION OF PROBLEM LIST AND OVERVIEW/CONTROLLED SUBSTANCE AGREEMENT    Last Written Prescription Date:  01/11/2019  Last Fill Quantity: 180 capsule,   # refills: 1    THE MOST RECENT OFFICE VISIT MUST BE WITHIN THE PAST 3 MONTHS. AT LEAST ONE FACE TO FACE VISIT MUST OCCUR EVERY 6 MONTHS. ADDITIONAL VISITS CAN BE VIRTUAL.  (THIS STATEMENT SHOULD BE DELETED.)    Last Office Visit with [facility] [place] care provider: 12/14/2018    Future Office visit:     Controlled substance agreement:   Encounter-Level CSA:    There are no encounter-level csa.     Patient-Level CSA:    There are no patient-level csa.         Last Urine Drug Screen: No results found for: CDAUT, No results found for: COMDAT, No results found for: THC13, PCP13, COC13, MAMP13, OPI13, AMP13, BZO13, TCA13, MTD13, BAR13, OXY13, PPX13, BUP13     Processing:  Fax Rx to [id number] [place], [place] - [address] [facility]. pharmacy     [email or web address]       checked in past 3 months?  No, route to IRENA STODDARD

## 2019-05-22 NOTE — TELEPHONE ENCOUNTER
RX [place] ([place]) reviewed:  reviewed- no concerns    [place]:  [email or web address]

## 2019-06-10 DIAGNOSIS — N95.1 FEMALE CLIMACTERIC: ICD-10-CM

## 2019-06-10 NOTE — TELEPHONE ENCOUNTER
Routing refill request to provider for review/approval because:  Drug interaction warning    Nathalie Bennett, RN

## 2019-06-10 NOTE — TELEPHONE ENCOUNTER
"Requested Prescriptions   Pending Prescriptions Disp Refills     citalopram (CELEXA) 40 MG tablet 90 tablet 3     Sig: TAKE 1 TABLET (40 MG) BY MOUTH DAILY     Last Written Prescription Date:  06/22/2018  Last Fill Quantity: 90 tablet,  # refills: 3   Last office visit: 12/14/2018 with prescribing provider:  12/14/2018   Future Office Visit:   Next 5 appointments (look out 90 days)    Jun 13, 2019  9:20 AM CDT  MyChart Physical Adult with Ely Dozier MD  [facility] [address] [facility] [address] [phone]               SSRIs Protocol Passed - 6/10/2019  7:28 AM        Passed - Recent (12 mo) or future (30 days) visit within the authorizing provider's specialty     Patient had office visit in the last 12 months or has a visit in the next 30 days with authorizing provider or within the authorizing provider's specialty.  See \"Patient Info\" tab in inbasket, or \"Choose Columns\" in Meds & Orders section of the refill encounter.              Passed - Medication is active on med list        Passed - Patient is age 18 or older        Passed - No active pregnancy on record        Passed - No positive pregnancy test in last 12 months        Gonzalez HERNANDEZT  "

## 2019-06-11 RX ORDER — CITALOPRAM HYDROBROMIDE 40 MG/1
TABLET ORAL
Qty: 90 TABLET | Refills: 0 | Status: SHIPPED | OUTPATIENT
Start: 2019-06-11 | End: 2019-07-10

## 2019-07-10 ENCOUNTER — OFFICE VISIT (OUTPATIENT)
Dept: FAMILY MEDICINE | Facility: CLINIC | Age: 54
End: 2019-07-10
Payer: COMMERCIAL

## 2019-07-10 VITALS
BODY MASS INDEX: 36.17 KG/M2 | WEIGHT: 217.1 LBS | SYSTOLIC BLOOD PRESSURE: 116 MMHG | TEMPERATURE: 98.6 F | OXYGEN SATURATION: 97 % | HEART RATE: 95 BPM | HEIGHT: 65 IN | DIASTOLIC BLOOD PRESSURE: 70 MMHG

## 2019-07-10 DIAGNOSIS — Z90.710 S/P HYSTERECTOMY: ICD-10-CM

## 2019-07-10 DIAGNOSIS — F41.1 GAD (GENERALIZED ANXIETY DISORDER): ICD-10-CM

## 2019-07-10 DIAGNOSIS — N95.1 MENOPAUSAL FLUSHING: ICD-10-CM

## 2019-07-10 DIAGNOSIS — E06.3 HYPOTHYROIDISM DUE TO HASHIMOTO'S THYROIDITIS: ICD-10-CM

## 2019-07-10 DIAGNOSIS — F17.200 TOBACCO USE DISORDER: ICD-10-CM

## 2019-07-10 DIAGNOSIS — Z00.00 ROUTINE GENERAL MEDICAL EXAMINATION AT A HEALTH CARE FACILITY: Primary | ICD-10-CM

## 2019-07-10 DIAGNOSIS — M54.30 SCIATIC LEG PAIN: ICD-10-CM

## 2019-07-10 LAB — HBA1C MFR BLD: 6.3 % (ref 0–5.6)

## 2019-07-10 PROCEDURE — 99396 PREV VISIT EST AGE 40-64: CPT | Performed by: FAMILY MEDICINE

## 2019-07-10 PROCEDURE — 99213 OFFICE O/P EST LOW 20 MIN: CPT | Mod: 25 | Performed by: FAMILY MEDICINE

## 2019-07-10 PROCEDURE — 83036 HEMOGLOBIN GLYCOSYLATED A1C: CPT | Performed by: FAMILY MEDICINE

## 2019-07-10 PROCEDURE — 80061 LIPID PANEL: CPT | Performed by: FAMILY MEDICINE

## 2019-07-10 PROCEDURE — 36415 COLL VENOUS BLD VENIPUNCTURE: CPT | Performed by: FAMILY MEDICINE

## 2019-07-10 PROCEDURE — 84443 ASSAY THYROID STIM HORMONE: CPT | Performed by: FAMILY MEDICINE

## 2019-07-10 RX ORDER — GABAPENTIN 600 MG/1
600 TABLET ORAL 2 TIMES DAILY
Qty: 180 TABLET | Refills: 3 | Status: SHIPPED | OUTPATIENT
Start: 2019-07-10 | End: 2019-07-11

## 2019-07-10 RX ORDER — VENLAFAXINE HYDROCHLORIDE 37.5 MG/1
CAPSULE, EXTENDED RELEASE ORAL
Qty: 60 CAPSULE | Refills: 1 | Status: SHIPPED | OUTPATIENT
Start: 2019-07-10 | End: 2019-07-11

## 2019-07-10 RX ORDER — LEVOTHYROXINE SODIUM 150 UG/1
150 TABLET ORAL DAILY
Qty: 90 TABLET | Refills: 3 | Status: SHIPPED | OUTPATIENT
Start: 2019-07-10 | End: 2020-08-17

## 2019-07-10 ASSESSMENT — ENCOUNTER SYMPTOMS
HEMATOCHEZIA: 0
MYALGIAS: 1
SHORTNESS OF BREATH: 0
NAUSEA: 0
HEADACHES: 0
PARESTHESIAS: 0
CONSTIPATION: 0
ABDOMINAL PAIN: 0
DYSURIA: 0
DIZZINESS: 0
DIARRHEA: 0
JOINT SWELLING: 1
ARTHRALGIAS: 1
FEVER: 0
SORE THROAT: 0
EYE PAIN: 0
CHILLS: 0
FREQUENCY: 0
NERVOUS/ANXIOUS: 1
BREAST MASS: 0
HEARTBURN: 0
WEAKNESS: 0
HEMATURIA: 0
PALPITATIONS: 0
COUGH: 0

## 2019-07-10 ASSESSMENT — PATIENT HEALTH QUESTIONNAIRE - PHQ9
SUM OF ALL RESPONSES TO PHQ QUESTIONS 1-9: 3
5. POOR APPETITE OR OVEREATING: SEVERAL DAYS

## 2019-07-10 ASSESSMENT — ANXIETY QUESTIONNAIRES
3. WORRYING TOO MUCH ABOUT DIFFERENT THINGS: NOT AT ALL
2. NOT BEING ABLE TO STOP OR CONTROL WORRYING: NOT AT ALL
6. BECOMING EASILY ANNOYED OR IRRITABLE: NOT AT ALL
5. BEING SO RESTLESS THAT IT IS HARD TO SIT STILL: NOT AT ALL
IF YOU CHECKED OFF ANY PROBLEMS ON THIS QUESTIONNAIRE, HOW DIFFICULT HAVE THESE PROBLEMS MADE IT FOR YOU TO DO YOUR WORK, TAKE CARE OF THINGS AT HOME, OR GET ALONG WITH OTHER PEOPLE: SOMEWHAT DIFFICULT
GAD7 TOTAL SCORE: 2
1. FEELING NERVOUS, ANXIOUS, OR ON EDGE: SEVERAL DAYS
7. FEELING AFRAID AS IF SOMETHING AWFUL MIGHT HAPPEN: NOT AT ALL

## 2019-07-10 ASSESSMENT — MIFFLIN-ST. JEOR: SCORE: 1587.7

## 2019-07-10 NOTE — PROGRESS NOTES
SUBJECTIVE:   CC: Arabella Hart is an 54 year old woman who presents for preventive health visit.     Healthy Habits:     Getting at least 3 servings of Calcium per day:  Yes    Bi-annual eye exam:  Yes    Dental care twice a year:  NO    Sleep apnea or symptoms of sleep apnea:  Daytime drowsiness    Diet:  Regular (no restrictions)    Frequency of exercise:  2-3 days/week    Duration of exercise:  15-30 minutes    Taking medications regularly:  No    Barriers to taking medications:  None    Medication side effects:  Not applicable    PHQ-2 Total Score: 1    Additional concerns today:  No      1. Tobacco use disorder - would like to quit, unsure how she would do this. Smoking 1/2 ppd. No dyspnea.     2. Hashimoto's thyroiditis - taking synthroid daily, no complaints.     3. NAGI -taking citalopram 40 mg daily.  She feels this is working well for her anxiety.    4. Menopausal hot flashes -was initially started on citalopram to help manage these better.  Notes citalopram does not seem to be working well enough.  She is interested in discussing alternative options    5. Insomnia -having trouble falling asleep after her evening shift.  Works 2-10 30 p.m. has tried over-the-counter agents like Benadryl, was not helpful.  Tried Ambien, had trouble with sleepwalking.  Wonders if melatonin would be helpful for her.    6.  Sciatica-still an ongoing issue for her, although better since her gabapentin dosage was increased to 600 mg twice daily.  She does not do any of her stretching exercises on a regular basis.        Today's PHQ-2 Score:   PHQ-2 ( 1999 [employer]) 7/10/2019   Q1: Little interest or pleasure in doing things 1   Q2: Feeling down, depressed or hopeless 0   PHQ-2 Score 1   Q1: Little interest or pleasure in doing things Several days   Q2: Feeling down, depressed or hopeless Not at all   PHQ-2 Score 1       Abuse: Current or Past(Physical, Sexual or Emotional)- No  Do you feel safe in your environment?  Yes    Social History     Tobacco Use     Smoking status: Current Some Day Smoker     Packs/day: 0.50     Years: 15.00     Pack years: 7.50     Types: Cigarettes     Smokeless tobacco: Never Used   Substance Use Topics     Alcohol use: Yes     Comment: 1-2 drinks seldom     If you drink alcohol do you typically have >3 drinks per day or >7 drinks per week? No    Alcohol Use 7/10/2019   Prescreen: >3 drinks/day or >7 drinks/week? No   Prescreen: >3 drinks/day or >7 drinks/week? -   AUDIT SCORE  -       Reviewed orders with patient.  Reviewed health maintenance and updated orders accordingly - Yes  Patient Active Problem List   Diagnosis     Allergic rhinitis     Uric acid kidney stones     S/P hysterectomy     Hypothyroidism due to Hashimoto's thyroiditis     Irritable bowel syndrome without diarrhea     NAGI (generalized anxiety disorder)     Menopausal flushing     Tobacco use disorder     Past Surgical History:   Procedure Laterality Date     C LIGATE FALLOPIAN TUBE,POSTPARTUM  1998    Tubal Ligation     C NONSPECIFIC PROCEDURE  1983    Right Ankle Surgery     COLONOSCOPY N/A 12/11/2018    Procedure: COLONOSCOPY;  Surgeon: Vishnu Mandujano MD;  Location: [facility]     LAPAROSCOPIC CHOLECYSTECTOMY N/A 6/4/2015    Procedure: LAPAROSCOPIC CHOLECYSTECTOMY;  Surgeon: Maria Fernanda Regalado MD;  Location: [facility]     LAPAROSCOPIC HYSTERECTOMY TOTAL, BILATERAL SALPINGO-OOPHORECTOMY, COMBINED  8/14/2012    Procedure: COMBINED LAPAROSCOPIC HYSTERECTOMY TOTAL, SALPINGO-OOPHORECTOMY;  LAPAROSCOPIC HYSTERECTOMY TOTAL, Bilateral SALPINGO-OOPHORECTOMY, pelvic exam under anesthesia;  Surgeon: Jolene Baez MD;  Location: [facility]       Social History     Tobacco Use     Smoking status: Current Some Day Smoker     Packs/day: 0.50     Years: 15.00     Pack years: 7.50     Types: Cigarettes     Smokeless tobacco: Never Used   Substance Use Topics     Alcohol use: Yes     Comment: 1-2 drinks seldom     Family History   Problem  Relation Age of Onset     Breast Cancer Paternal Grandmother      Thyroid Disease Mother         Hypothyroid     Cancer Mother         Ovarian - age 61     Coronary Artery Disease Paternal Grandfather      Hypertension Father         Heart complication after MI.       Cancer Father         Throat (Epiglottis) smoker     Heart Disease Father          of heart attack     Coronary Artery Disease Father      Breast Cancer Paternal Aunt         and P great aunt     Breast Cancer Paternal Aunt      Coronary Artery Disease Brother      Colon Cancer No family hx of            Mammogram Screening: Patient over age 50, mutual decision to screen reflected in health maintenance.    Pertinent mammograms are reviewed under the imaging tab.  History of abnormal Pap smear: Status post benign hysterectomy. Health Maintenance and Surgical History updated.  PAP / HPV Latest Ref Rng & Units 2016   PAP - NIL NIL HSIL(A)   HPV 16 DNA NEG Negative - -   HPV 18 DNA NEG Negative - -   OTHER HR HPV NEG Negative - -     Reviewed and updated as needed this visit by clinical staff  Tobacco  Allergies  Meds  Med Hx  Surg Hx  Fam Hx  Soc Hx        Reviewed and updated as needed this visit by Provider  Meds            Review of Systems  CONSTITUTIONAL: NEGATIVE for fever, chills, change in weight  INTEGUMENTARY/SKIN: NEGATIVE for worrisome rashes, moles or lesions  EYES: NEGATIVE for vision changes or irritation  ENT: NEGATIVE for ear, mouth and throat problems  RESP: NEGATIVE for significant cough or SOB  BREAST: NEGATIVE for masses, tenderness or discharge  CV: NEGATIVE for chest pain, palpitations or peripheral edema  GI: NEGATIVE for nausea, abdominal pain, heartburn, or change in bowel habits  : NEGATIVE for unusual urinary or vaginal symptoms. No vaginal bleeding.  MUSCULOSKELETAL: NEGATIVE for significant arthralgias or myalgia  NEURO: NEGATIVE for weakness, dizziness or  "paresthesias  PSYCHIATRIC: NEGATIVE for changes in mood or affect      OBJECTIVE:   /70 (BP Location: Right arm, Patient Position: Chair, Cuff Size: Adult Large)   Pulse 95   Temp 98.6  F (37  C) (Oral)   Ht 1.638 m (5' 4.5\")   Wt 98.5 kg (217 lb 1.6 oz)   LMP 08/12/2012   SpO2 97%   BMI 36.69 kg/m    Physical Exam  GENERAL APPEARANCE: healthy, alert and no distress  EYES: Eyes grossly normal to inspection, PERRL and conjunctivae and sclerae normal  HENT: ear canals and TM's normal, nose and mouth without ulcers or lesions, oropharynx clear and oral mucous membranes moist  NECK: no adenopathy, no asymmetry, masses, or scars and thyroid normal to palpation  RESP: lungs clear to auscultation - no rales, rhonchi or wheezes  BREAST: normal without masses, tenderness or nipple discharge and no palpable axillary masses or adenopathy  CV: regular rate and rhythm, normal S1 S2, no S3 or S4, no murmur, click or rub, no peripheral edema and peripheral pulses strong  ABDOMEN: soft, nontender, no hepatosplenomegaly, no masses and bowel sounds normal  MS: no musculoskeletal defects are noted and gait is age appropriate without ataxia  SKIN: no suspicious lesions or rashes  NEURO: Normal strength and tone, sensory exam grossly normal, mentation intact and speech normal  PSYCH: mentation appears normal and affect normal/bright    Diagnostic Test Results:  Labs reviewed in Epic    ASSESSMENT/PLAN:   1. Routine general medical examination at a health care facility  - Lipid panel reflex to direct LDL Fasting  - Hemoglobin A1c    2. Tobacco use disorder - discussed practicing planning meals, prepping snacks for the next 3 months, then setting a quit date. Having the diet piece in place first may be helpful for her to avoid weight gain.     3. Hypothyroidism due to Hashimoto's thyroiditis - will check labs today, refills  - TSH with free T4 reflex  - levothyroxine (SYNTHROID/LEVOTHROID) 150 MCG tablet; Take 1 tablet (150 " "mcg) by mouth daily  Dispense: 90 tablet; Refill: 3    4. S/P hysterectomy - no pap needed    5. NAGI (generalized anxiety disorder) - in citalopram currently, will switch to effexor as below    6. Menopausal flushing - discussed switching to effexor to better help with hot flashes, also helpful for anxiety.   - venlafaxine (EFFEXOR-XR) 37.5 MG 24 hr capsule; Take 1 capsule daily for 1 week, then increase to 2 capsules daily going forward  Dispense: 60 capsule; Refill: 1    7. Sciatic leg pain - advised daily stretching, refills  - gabapentin (NEURONTIN) 600 MG tablet; Take 1 tablet (600 mg) by mouth 2 times daily  Dispense: 180 tablet; Refill: 3    COUNSELING:  Reviewed preventive health counseling, as reflected in patient instructions    Estimated body mass index is 36.69 kg/m  as calculated from the following:    Height as of this encounter: 1.638 m (5' 4.5\").    Weight as of this encounter: 98.5 kg (217 lb 1.6 oz).     reports that she has been smoking cigarettes.  She has a 7.50 pack-year smoking history. She has never used smokeless tobacco.    Ely Dozier MD  [facility]  "

## 2019-07-10 NOTE — PROGRESS NOTES
"Subjective     Arabella Hart is a 54 year old female who presents to clinic today for the following health issues:    HPI   {SUPERLIST (Optional):045398}  {additonal problems for provider to add (Optional):743971}    {HIST REVIEW/ LINKS 2 (Optional):168593}    {Additional problems for the provider to add (optional):695697}  Reviewed and updated as needed this visit by Provider         Review of Systems   {ROS COMP (Optional):797785}      Objective    LMP 08/12/2012   There is no height or weight on file to calculate BMI.  Physical Exam   {Exam List (Optional):977744}    {Diagnostic Test Results (Optional):873816::\"Diagnostic Test Results:\",\"Labs reviewed in Epic\"}        {PROVIDER CHARTING PREFERENCE:998584}        "

## 2019-07-10 NOTE — PATIENT INSTRUCTIONS
Cut citalopram to 20 mg daily  START effexor at 37.5 mg daily    After 1 week, stop citalopram and increase effexor to 75 mg daily      Preventive Health Recommendations  Female Ages 50 - 64    Yearly exam: See your health care provider every year in order to  o Review health changes.   o Discuss preventive care.    o Review your medicines if your doctor has prescribed any.      Get a Pap test every three years (unless you have an abnormal result and your provider advises testing more often).    If you get Pap tests with HPV test, you only need to test every 5 years, unless you have an abnormal result.     You do not need a Pap test if your uterus was removed (hysterectomy) and you have not had cancer.    You should be tested each year for STDs (sexually transmitted diseases) if you're at risk.     Have a mammogram every 1 to 2 years.    Have a colonoscopy at age 50, or have a yearly FIT test (stool test). These exams screen for colon cancer.      Have a cholesterol test every 5 years, or more often if advised.    Have a diabetes test (fasting glucose) every three years. If you are at risk for diabetes, you should have this test more often.     If you are at risk for osteoporosis (brittle bone disease), think about having a bone density scan (DEXA).    Shots: Get a flu shot each year. Get a tetanus shot every 10 years.    Nutrition:     Eat at least 5 servings of fruits and vegetables each day.    Eat whole-grain bread, whole-wheat pasta and brown rice instead of white grains and rice.    Get adequate Calcium and Vitamin D.     Lifestyle    Exercise at least 150 minutes a week (30 minutes a day, 5 days a week). This will help you control your weight and prevent disease.    Limit alcohol to one drink per day.    No smoking.     Wear sunscreen to prevent skin cancer.     See your dentist every six months for an exam and cleaning.    See your eye doctor every 1 to 2 years.

## 2019-07-11 ENCOUNTER — MYC MEDICAL ADVICE (OUTPATIENT)
Dept: FAMILY MEDICINE | Facility: CLINIC | Age: 54
End: 2019-07-11

## 2019-07-11 DIAGNOSIS — M54.30 SCIATIC LEG PAIN: ICD-10-CM

## 2019-07-11 LAB
CHOLEST SERPL-MCNC: 227 MG/DL
HDLC SERPL-MCNC: 37 MG/DL
LDLC SERPL CALC-MCNC: 145 MG/DL
NONHDLC SERPL-MCNC: 190 MG/DL
TRIGL SERPL-MCNC: 226 MG/DL
TSH SERPL DL<=0.005 MIU/L-ACNC: 1.98 MU/L (ref 0.4–4)

## 2019-07-11 RX ORDER — VENLAFAXINE HYDROCHLORIDE 37.5 MG/1
37.5 CAPSULE, EXTENDED RELEASE ORAL DAILY
Qty: 14 CAPSULE | Refills: 0 | Status: SHIPPED | OUTPATIENT
Start: 2019-07-11 | End: 2019-08-21 | Stop reason: DRUGHIGH

## 2019-07-11 RX ORDER — GABAPENTIN 600 MG/1
600 TABLET ORAL 3 TIMES DAILY
Qty: 180 TABLET | Refills: 3 | Status: SHIPPED | OUTPATIENT
Start: 2019-07-11 | End: 2020-04-13

## 2019-07-11 RX ORDER — VENLAFAXINE HYDROCHLORIDE 75 MG/1
75 CAPSULE, EXTENDED RELEASE ORAL DAILY
Qty: 30 CAPSULE | Refills: 1 | Status: SHIPPED | OUTPATIENT
Start: 2019-07-11 | End: 2019-08-21

## 2019-07-11 ASSESSMENT — ANXIETY QUESTIONNAIRES: GAD7 TOTAL SCORE: 2

## 2019-07-11 NOTE — TELEPHONE ENCOUNTER
Please advise  Pt wants to go back to TID dosing of gabapentin instead of BID    Graeme Whittington RN, BSN

## 2019-08-08 DIAGNOSIS — N95.1 MENOPAUSAL FLUSHING: ICD-10-CM

## 2019-08-08 RX ORDER — VENLAFAXINE HYDROCHLORIDE 75 MG/1
75 CAPSULE, EXTENDED RELEASE ORAL DAILY
Qty: 30 CAPSULE | Refills: 1 | Status: CANCELLED | OUTPATIENT
Start: 2019-08-08

## 2019-08-08 NOTE — TELEPHONE ENCOUNTER
Spoke with pharmacy and they have a refill on file and will let patient know when it is ready    Graeme Whittington RN, BSN

## 2019-08-08 NOTE — TELEPHONE ENCOUNTER
Mychart sent to verify if pt needs refill prior.  Should not based on date prescribed    Graeme Whittington RN, BSN

## 2019-08-08 NOTE — TELEPHONE ENCOUNTER
"Requested Prescriptions   Pending Prescriptions Disp Refills     venlafaxine (EFFEXOR-XR) 75 MG 24 hr capsule 30 capsule 1     Sig: Take 1 capsule (75 mg) by mouth daily     Last Written Prescription Date:  07/11/2019  Last Fill Quantity: 30 capsule,  # refills: 1   Last office visit: 7/10/2019 with prescribing provider:  07/10/2019   Future Office Visit:   Next 5 appointments (look out 90 days)    Aug 21, 2019  9:20 AM CDT  [facility] [address] [facility] [address] [phone]               Serotonin-Norepinephrine Reuptake Inhibitors  Failed - 8/8/2019  8:52 AM        Failed - Normal serum creatinine on file in past 12 months     Recent Labs   Lab Test 06/07/17  0824   CR 0.67             Passed - Blood pressure under 140/90 in past 12 months     BP Readings from Last 3 Encounters:   07/10/19 116/70   04/25/19 122/70   12/14/18 122/80                 Passed - Recent (12 mo) or future (30 days) visit within the authorizing provider's specialty     Patient had office visit in the last 12 months or has a visit in the next 30 days with authorizing provider or within the authorizing provider's specialty.  See \"Patient Info\" tab in inbasket, or \"Choose Columns\" in Meds & Orders section of the refill encounter.              Passed - Medication is active on med list        Passed - Patient is age 18 or older        Passed - No active pregnancy on record        Passed - No positive pregnancy test in past 12 months        Gonzalez Davis [id number]  "

## 2019-08-21 ENCOUNTER — VIRTUAL VISIT (OUTPATIENT)
Dept: FAMILY MEDICINE | Facility: CLINIC | Age: 54
End: 2019-08-21
Payer: COMMERCIAL

## 2019-08-21 DIAGNOSIS — N95.1 MENOPAUSAL FLUSHING: Primary | ICD-10-CM

## 2019-08-21 PROCEDURE — 99441 ZZC PHYSICIAN TELEPHONE EVALUATION 5-10 MIN: CPT | Performed by: FAMILY MEDICINE

## 2019-08-21 RX ORDER — VENLAFAXINE HYDROCHLORIDE 75 MG/1
75 CAPSULE, EXTENDED RELEASE ORAL DAILY
Qty: 30 CAPSULE | Refills: 3 | Status: SHIPPED | OUTPATIENT
Start: 2019-08-21 | End: 2019-12-13

## 2019-08-21 RX ORDER — VENLAFAXINE HYDROCHLORIDE 37.5 MG/1
37.5 CAPSULE, EXTENDED RELEASE ORAL DAILY
Qty: 30 CAPSULE | Refills: 3 | Status: SHIPPED | OUTPATIENT
Start: 2019-08-21 | End: 2019-12-13

## 2019-08-21 ASSESSMENT — PATIENT HEALTH QUESTIONNAIRE - PHQ9
5. POOR APPETITE OR OVEREATING: NOT AT ALL
SUM OF ALL RESPONSES TO PHQ QUESTIONS 1-9: 2

## 2019-08-21 ASSESSMENT — ANXIETY QUESTIONNAIRES
IF YOU CHECKED OFF ANY PROBLEMS ON THIS QUESTIONNAIRE, HOW DIFFICULT HAVE THESE PROBLEMS MADE IT FOR YOU TO DO YOUR WORK, TAKE CARE OF THINGS AT HOME, OR GET ALONG WITH OTHER PEOPLE: NOT DIFFICULT AT ALL
5. BEING SO RESTLESS THAT IT IS HARD TO SIT STILL: NOT AT ALL
1. FEELING NERVOUS, ANXIOUS, OR ON EDGE: SEVERAL DAYS
3. WORRYING TOO MUCH ABOUT DIFFERENT THINGS: NOT AT ALL
7. FEELING AFRAID AS IF SOMETHING AWFUL MIGHT HAPPEN: NOT AT ALL
6. BECOMING EASILY ANNOYED OR IRRITABLE: NOT AT ALL
GAD7 TOTAL SCORE: 1
2. NOT BEING ABLE TO STOP OR CONTROL WORRYING: NOT AT ALL

## 2019-08-21 NOTE — PROGRESS NOTES
"Arabella Hart is a 54 year old female who is being evaluated via a billable telephone visit.      The patient has been notified of following:     \"This telephone visit will be conducted via a call between you and your physician/provider. We have found that certain health care needs can be provided without the need for a physical exam.  This service lets us provide the care you need with a short phone conversation.  If a prescription is necessary we can send it directly to your pharmacy.  If lab work is needed we can place an order for that and you can then stop by our lab to have the test done at a later time.    If during the course of the call the physician/provider feels a telephone visit is not appropriate, you will not be charged for this service.\"     Consent has been obtained for this service by 1 care team member: yes. See the scanned image in the medical record.    Arabella Hart complains of    Chief Complaint   Patient presents with     Recheck Medication       I have reviewed and updated the patient's Past Medical History, Social History, Family History and Medication List.    ALLERGIES  Ibuprofen; Monosodium glutamate; and Oxycodone    Saeid Barrios CMA       (MA signature)    Additional provider notes:     Started on Effexor 75 mg in July 2019.  She reports an improvement in her hot flashes since initiation.  She still has occasional high anxiety episodes.  She would like more help with this.  Overall she is pleased with medication.  No side effects.      Assessment/Plan:  1. Menopausal flushing -improved, will increase Effexor to 112.5 mg daily to account for continued anxiety.  Reviewed potential side effects.  She should contact me in the next 2 to 4 weeks if she feels there is still room for improvement, otherwise can request refills.  - venlafaxine (EFFEXOR-XR) 75 MG 24 hr capsule; Take 1 capsule (75 mg) by mouth daily  Dispense: 30 capsule; Refill: 3  - venlafaxine (EFFEXOR-XR) 37.5 MG " 24 hr capsule; Take 1 capsule (37.5 mg) by mouth daily  Dispense: 30 capsule; Refill: 3      I have reviewed the note as documented above.  This accurately captures the substance of my conversation with the patient.      Total time of call between patient and provider was 5 minutes     Ely Dozier MD

## 2019-08-22 ASSESSMENT — ANXIETY QUESTIONNAIRES: GAD7 TOTAL SCORE: 1

## 2019-09-12 ENCOUNTER — OFFICE VISIT (OUTPATIENT)
Dept: ORTHOPEDICS | Facility: CLINIC | Age: 54
End: 2019-09-12
Payer: COMMERCIAL

## 2019-09-12 VITALS
HEIGHT: 65 IN | BODY MASS INDEX: 36.49 KG/M2 | WEIGHT: 219 LBS | SYSTOLIC BLOOD PRESSURE: 120 MMHG | DIASTOLIC BLOOD PRESSURE: 72 MMHG

## 2019-09-12 DIAGNOSIS — M17.11 PRIMARY OSTEOARTHRITIS OF RIGHT KNEE: Primary | ICD-10-CM

## 2019-09-12 DIAGNOSIS — M17.12 PRIMARY OSTEOARTHRITIS OF LEFT KNEE: ICD-10-CM

## 2019-09-12 PROCEDURE — 20610 DRAIN/INJ JOINT/BURSA W/O US: CPT | Mod: 50 | Performed by: ORTHOPAEDIC SURGERY

## 2019-09-12 RX ORDER — LIDOCAINE HYDROCHLORIDE 10 MG/ML
4 INJECTION, SOLUTION INFILTRATION; PERINEURAL
Status: DISCONTINUED | OUTPATIENT
Start: 2019-09-12 | End: 2020-02-03

## 2019-09-12 RX ORDER — TESTOSTERONE CYPIONATE 200 MG/ML
2 INJECTION INTRAMUSCULAR
Status: DISCONTINUED | OUTPATIENT
Start: 2019-09-12 | End: 2020-02-03

## 2019-09-12 RX ADMIN — LIDOCAINE HYDROCHLORIDE 4 ML: 10 INJECTION, SOLUTION INFILTRATION; PERINEURAL at 09:14

## 2019-09-12 RX ADMIN — TESTOSTERONE CYPIONATE 2 ML: 200 INJECTION INTRAMUSCULAR at 09:14

## 2019-09-12 ASSESSMENT — MIFFLIN-ST. JEOR: SCORE: 1596.32

## 2019-09-12 NOTE — PROGRESS NOTES
HISTORY OF PRESENT ILLNESS:    Arabella Hart is a 54 year old female who is seen in follow up for bilateral knee pain. Right knee is slightly worse than left knee pain. Increasing knee pain over the last 2 weeks.  Patient was last evaluated on on 4/25/19, and had bilateral  knee corticosteroid injection. Patient notes no increase in blood sugars at that time. A1C of 6.3 on 7/10/19.  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   Present symptoms: right knee pain located laterally, left knee pain is located laterally, and anteriorly. Pain is sharp, aching, dull, and throbbing.  Swelling of bilateral knees and lower legs, patient just completed double shift working as LPN. Swelling dissipates with rest, and elevation. Patient notes clicking of the knees bilaterally, no catching, locking, or buckling. Increased pain with sitting with legs hanging, and driving for extend periods of time.  Increased pain with going up stairs especially with carrying extra items I.e. Laundry basket. She notes she has been taking the elevator at work more often, and is not walking as much as previously due to increasing knee pain.   Current pain level:  Right: 5/10, Left: 3/10,  Worst pain level: 5/10  Treatments tried to this point: naproxen, Tylenol, icing, and heat.  Pain relieved with short walks and frequent moving.  Past Medical History: Unchanged from the visit of 9/5/18. Please refer to that note.    REVIEW OF SYSTEMS:  CONSTITUTIONAL:  NEGATIVE for fever, chills, change in weight  INTEGUMENTARY/SKIN:  NEGATIVE for worrisome rashes, moles or lesions  EYES:  NEGATIVE for vision changes or irritation  ENT/MOUTH:  NEGATIVE for ear, mouth and throat problems  RESP:  NEGATIVE for significant cough or SOB  BREAST:  NEGATIVE for masses, tenderness or discharge  CV:  NEGATIVE for chest pain, palpitations or peripheral edema  GI:  NEGATIVE for nausea, abdominal pain, heartburn, or change in bowel habits  :  Negative  "  MUSCULOSKELETAL:  See HPI above  NEURO:  NEGATIVE for weakness, dizziness or paresthesias  ENDOCRINE:  NEGATIVE for temperature intolerance, skin/hair changes  HEME/ALLERGY/IMMUNE:  NEGATIVE for bleeding problems  PSYCHIATRIC:  NEGATIVE for changes in mood or affect    PHYSICAL EXAM:  /72 (BP Location: Right arm, Patient Position: Chair, Cuff Size: Adult Large)   Ht 1.638 m (5' 4.5\")   Wt 99.3 kg (219 lb)   LMP 08/12/2012   BMI 37.01 kg/m    Body mass index is 37.01 kg/m .   GENERAL APPEARANCE: healthy, alert and no distress   SKIN: no suspicious lesions or rashes  NEURO: Normal strength and tone, mentation intact and speech normal  VASCULAR:  good pulses, and cappillary refill   LYMPH: no lymphadenopathy   PSYCH:  mentation appears normal and affect normal/bright    MSK:  No noticeable limp  Minimal swelling, bilateral knees  No erythema or deformities  Full symmetrical range of motion, bilateral  Patellofemoral tenderness with a palpation  Mild medial joint line pain, right more so than left  No popliteal mass noted  Extensor mechanism is intact    IMAGING INTERPRETATION:   None taken today but visualized images of both knees from before.  Right knee has a slightly more noticeable medial joint space narrowing otherwise early patellofemoral degenerative changes are symmetrical.      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  We are also concerned about damaging good cartilage in the knee.  However, given the nature of the problem, that is, pathology being mostly at the anterior compartment, intermittent cord injection again was felt to be the best option along with continuing effort and weight loss.  All the questions were answered.  She tolerated cortisone injections quite well.         Large Joint " Injection/Arthocentesis: R knee joint  Date/Time: 9/12/2019 9:14 AM  Performed by: Jax Epps MD  Authorized by: Jax Epps MD     Indications:  Pain and osteoarthritis  Needle Size:  22 G  Guidance: landmark guided    Approach:  Anteromedial  Location:  Knee      Medications:  2 mL dexamethasone 120 MG/30ML; 4 mL lidocaine 1 %  Medications comment:  8mL of 1% Lidocaine was injected.  Outcome:  Tolerated well, no immediate complications  Procedure discussed: discussed risks, benefits, and alternatives    Consent Given by:  Patient  Timeout: timeout called immediately prior to procedure    Prep: patient was prepped and draped in usual sterile fashion    Large Joint Injection/Arthocentesis: L knee joint  Date/Time: 9/12/2019 9:14 AM  Performed by: Jax Epps MD  Authorized by: Jax Epps MD     Indications:  Pain and osteoarthritis  Needle Size:  22 G  Guidance: landmark guided    Approach:  Anterolateral  Location:  Knee      Medications:  4 mL lidocaine 1 %; 2 mL dexamethasone 120 MG/30ML  Medications comment:  8mL of 1% Lidocaine was injected.  Outcome:  Tolerated well, no immediate complications  Procedure discussed: discussed risks, benefits, and alternatives    Consent Given by:  Patient  Timeout: timeout called immediately prior to procedure    Prep: patient was prepped and draped in usual sterile fashion            Jax Epps MD  Dept. Orthopedic Surgery  [facility]       Disclaimer: This note consists of symbols derived from keyboarding, dictation and/or voice recognition software. As a result, there may be errors in the script that have gone undetected. Please consider this when interpreting information found in this chart.

## 2019-09-12 NOTE — PATIENT INSTRUCTIONS
1. Primary osteoarthritis of right knee    2. Primary osteoarthritis of left knee      Steroid injection of the bilateral knee was performed today in clinic    - Would not soak in a hot tub, bath or swimming pool for 48 hours  - Ok to shower  - Ice today and only do your normal amounts of activity  - The lidocaine (what is giving you pain relief right now) will likely stop working in 1-2 hours.  You will then have pain again, similar to before you received the injection. The corticosteroid will not start working until approximately 1-2 weeks from now.  In a small percentage of people, cortisone can cause flushing/redness in the face. This usually lasts for 1-3 days and resolves. Cool compress and Ibuprofen/Tylenol can help if this happens.  Can repeat the injection anytime after 4 months    Follow up for repeat injection when pain returns.

## 2019-09-12 NOTE — LETTER
9/12/2019         RE: Arabella Hart  [address] [facility] [address]        Dear Colleague,    Thank you for referring your patient, Arabella Hart, to the [facility] ORTHOPEDIC SURGERY. Please see a copy of my visit note below.    HISTORY OF PRESENT ILLNESS:    Arabella Hart is a 54 year old female who is seen in follow up for bilateral knee pain. Right knee is slightly worse than left knee pain. Increasing knee pain over the last 2 weeks.  Patient was last evaluated on on 4/25/19, and had bilateral  knee corticosteroid injection. Patient notes no increase in blood sugars at that time. A1C of 6.3 on 7/10/19.  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   Present symptoms: right knee pain located laterally, left knee pain is located laterally, and anteriorly. Pain is sharp, aching, dull, and throbbing.  Swelling of bilateral knees and lower legs, patient just completed double shift working as LPN. Swelling dissipates with rest, and elevation. Patient notes clicking of the knees bilaterally, no catching, locking, or buckling. Increased pain with sitting with legs hanging, and driving for extend periods of time.  Increased pain with going up stairs especially with carrying extra items I.e. Laundry basket. She notes she has been taking the elevator at work more often, and is not walking as much as previously due to increasing knee pain.   Current pain level:  Right: 5/10, Left: 3/10,  Worst pain level: 5/10  Treatments tried to this point: naproxen, Tylenol, icing, and heat.  Pain relieved with short walks and frequent moving.  Past Medical History: Unchanged from the visit of 9/5/18. Please refer to that note.    REVIEW OF SYSTEMS:  CONSTITUTIONAL:  NEGATIVE for fever, chills, change in weight  INTEGUMENTARY/SKIN:  NEGATIVE for worrisome rashes, moles or lesions  EYES:  NEGATIVE for vision changes or irritation  ENT/MOUTH:  NEGATIVE for ear, mouth and throat  "problems  RESP:  NEGATIVE for significant cough or SOB  BREAST:  NEGATIVE for masses, tenderness or discharge  CV:  NEGATIVE for chest pain, palpitations or peripheral edema  GI:  NEGATIVE for nausea, abdominal pain, heartburn, or change in bowel habits  :  Negative   MUSCULOSKELETAL:  See HPI above  NEURO:  NEGATIVE for weakness, dizziness or paresthesias  ENDOCRINE:  NEGATIVE for temperature intolerance, skin/hair changes  HEME/ALLERGY/IMMUNE:  NEGATIVE for bleeding problems  PSYCHIATRIC:  NEGATIVE for changes in mood or affect    PHYSICAL EXAM:  /72 (BP Location: Right arm, Patient Position: Chair, Cuff Size: Adult Large)   Ht 1.638 m (5' 4.5\")   Wt 99.3 kg (219 lb)   LMP 08/12/2012   BMI 37.01 kg/m     Body mass index is 37.01 kg/m .   GENERAL APPEARANCE: healthy, alert and no distress   SKIN: no suspicious lesions or rashes  NEURO: Normal strength and tone, mentation intact and speech normal  VASCULAR:  good pulses, and cappillary refill   LYMPH: no lymphadenopathy   PSYCH:  mentation appears normal and affect normal/bright    MSK:  No noticeable limp  Minimal swelling, bilateral knees  No erythema or deformities  Full symmetrical range of motion, bilateral  Patellofemoral tenderness with a palpation  Mild medial joint line pain, right more so than left  No popliteal mass noted  Extensor mechanism is intact    IMAGING INTERPRETATION:   None taken today but visualized images of both knees from before.  Right knee has a slightly more noticeable medial joint space narrowing otherwise early patellofemoral degenerative changes are symmetrical.      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  We are also concerned about damaging good cartilage in the knee.  However, given the nature of the problem, that " is, pathology being mostly at the anterior compartment, intermittent cord injection again was felt to be the best option along with continuing effort and weight loss.  All the questions were answered.  She tolerated cortisone injections quite well.         Large Joint Injection/Arthocentesis: R knee joint  Date/Time: 9/12/2019 9:14 AM  Performed by: Jax Epps MD  Authorized by: Jax Epps MD     Indications:  Pain and osteoarthritis  Needle Size:  22 G  Guidance: landmark guided    Approach:  Anteromedial  Location:  Knee      Medications:  2 mL dexamethasone 120 MG/30ML; 4 mL lidocaine 1 %  Medications comment:  8mL of 1% Lidocaine was injected.  Outcome:  Tolerated well, no immediate complications  Procedure discussed: discussed risks, benefits, and alternatives    Consent Given by:  Patient  Timeout: timeout called immediately prior to procedure    Prep: patient was prepped and draped in usual sterile fashion    Large Joint Injection/Arthocentesis: L knee joint  Date/Time: 9/12/2019 9:14 AM  Performed by: Jax Epps MD  Authorized by: Jax Epps MD     Indications:  Pain and osteoarthritis  Needle Size:  22 G  Guidance: landmark guided    Approach:  Anterolateral  Location:  Knee      Medications:  4 mL lidocaine 1 %; 2 mL dexamethasone 120 MG/30ML  Medications comment:  8mL of 1% Lidocaine was injected.  Outcome:  Tolerated well, no immediate complications  Procedure discussed: discussed risks, benefits, and alternatives    Consent Given by:  Patient  Timeout: timeout called immediately prior to procedure    Prep: patient was prepped and draped in usual sterile fashion            Jax Epps MD  Dept. Orthopedic Surgery  [facility]       Disclaimer: This note consists of symbols derived from keyboarding, dictation and/or voice recognition software. As a result, there may be errors in the script that have gone undetected. Please consider this when interpreting information  found in this chart.      Again, thank you for allowing me to participate in the care of your patient.        Sincerely,        Jax Epps MD

## 2019-09-21 NOTE — TELEPHONE ENCOUNTER
"Requested Prescriptions   Pending Prescriptions Disp Refills     citalopram (CELEXA) 40 MG tablet  Last Written Prescription Date:  07/10/2019  Last Fill Quantity: 90,  # refills: 0   Last office visit: 8/21/2019 with prescribing provider:  08/21/2019   Future Office Visit:           Sig: Take 1 tablet (40 mg) by mouth daily       SSRIs Protocol Failed - 9/21/2019  4:01 PM  [facility] Follow-up to PHQ 12/14/2018 7/10/2019 8/21/2019   PHQ-9 9. Suicide Ideation past 2 weeks Not at all Not at all Not at all             Failed - Medication is active on med list        Passed - Recent (12 mo) or future (30 days) visit within the authorizing provider's specialty     Patient had office visit in the last 12 months or has a visit in the next 30 days with authorizing provider or within the authorizing provider's specialty.  See \"Patient Info\" tab in inbasket, or \"Choose Columns\" in Meds & Orders section of the refill encounter.              Passed - Patient is age 18 or older        Passed - No active pregnancy on record        Passed - No positive pregnancy test in last 12 months          "

## 2019-09-23 RX ORDER — CITALOPRAM HYDROBROMIDE 40 MG/1
40 TABLET ORAL DAILY
OUTPATIENT
Start: 2019-09-23

## 2019-09-30 ENCOUNTER — HEALTH MAINTENANCE LETTER (OUTPATIENT)
Age: 54
End: 2019-09-30

## 2019-12-08 DIAGNOSIS — N95.1 MENOPAUSAL FLUSHING: ICD-10-CM

## 2019-12-09 NOTE — TELEPHONE ENCOUNTER
LMTRC    Per August visit pt is to be taking 37.5 mg and 75mg together but only the 37.5 mg was requested.  Does she need the 75 mg as well?    Graeme Whittington RN, BSN

## 2019-12-10 NOTE — TELEPHONE ENCOUNTER
"Routing refill request to provider for review/approval because:  Per RN protocol needs in range creatinine     Pt states she is doing well with this dose but she has had some increased stressors and feels this is why she is feeling a bit of depression \"I really think it is situational\"     Nathalie Bennett, IRENA Bennett RN    "

## 2019-12-13 RX ORDER — VENLAFAXINE HYDROCHLORIDE 37.5 MG/1
37.5 CAPSULE, EXTENDED RELEASE ORAL DAILY
Qty: 30 CAPSULE | Refills: 3 | Status: SHIPPED | OUTPATIENT
Start: 2019-12-13 | End: 2020-04-13

## 2019-12-13 RX ORDER — VENLAFAXINE HYDROCHLORIDE 75 MG/1
75 CAPSULE, EXTENDED RELEASE ORAL DAILY
Qty: 30 CAPSULE | Refills: 3 | Status: SHIPPED | OUTPATIENT
Start: 2019-12-13 | End: 2020-04-13

## 2020-02-03 ENCOUNTER — OFFICE VISIT (OUTPATIENT)
Dept: FAMILY MEDICINE | Facility: CLINIC | Age: 55
End: 2020-02-03
Payer: COMMERCIAL

## 2020-02-03 VITALS
HEART RATE: 81 BPM | RESPIRATION RATE: 18 BRPM | BODY MASS INDEX: 35.65 KG/M2 | DIASTOLIC BLOOD PRESSURE: 80 MMHG | TEMPERATURE: 98.8 F | WEIGHT: 214 LBS | OXYGEN SATURATION: 98 % | SYSTOLIC BLOOD PRESSURE: 138 MMHG | HEIGHT: 65 IN

## 2020-02-03 DIAGNOSIS — J01.00 ACUTE MAXILLARY SINUSITIS, RECURRENCE NOT SPECIFIED: Primary | ICD-10-CM

## 2020-02-03 PROCEDURE — 99213 OFFICE O/P EST LOW 20 MIN: CPT | Performed by: PHYSICIAN ASSISTANT

## 2020-02-03 RX ORDER — AMOXICILLIN 875 MG
875 TABLET ORAL 2 TIMES DAILY
Qty: 20 TABLET | Refills: 0 | Status: SHIPPED | OUTPATIENT
Start: 2020-02-03 | End: 2020-04-13

## 2020-02-03 ASSESSMENT — MIFFLIN-ST. JEOR: SCORE: 1573.64

## 2020-02-03 NOTE — PROGRESS NOTES
Subjective     Arabella Hart is a 54 year old female who presents to clinic today for the following health issues:    HPI   Acute Illness   Acute illness concerns: Sinus infection   Onset: 3-4 weeks     Fever: no    Chills/Sweats: YES    Headache (location?): YES    Sinus Pressure:YES    Conjunctivitis:  no    Ear Pain: YES: bilateral    Rhinorrhea: no    Congestion: YES    Sore Throat: no     Cough: YES - tickle from the drainage     Wheeze: no    Decreased Appetite: no    Nausea: no    Vomiting: no    Diarrhea:  no    Dysuria/Freq.: no    Fatigue/Achiness: YES    Sick/Strep Exposure: no     Therapies Tried and outcome: allegra, nasal inhaler, guaifenesin, sinus meds none with long relief         Current Outpatient Medications   Medication Sig Dispense Refill     ACETAMINOPHEN PO Take 325-750 mg by mouth every 6 hours as needed.         amoxicillin (AMOXIL) 875 MG tablet Take 1 tablet (875 mg) by mouth 2 times daily for 10 days 20 tablet 0     Fexofenadine HCl (ALLEGRA ALLERGY PO) Take  by mouth.         gabapentin (NEURONTIN) 600 MG tablet Take 1 tablet (600 mg) by mouth 3 times daily 180 tablet 3     levothyroxine (SYNTHROID/LEVOTHROID) 150 MCG tablet Take 1 tablet (150 mcg) by mouth daily 90 tablet 3     naproxen sodium 220 MG capsule Take 220 mg by mouth 2 times daily (with meals)       venlafaxine (EFFEXOR-XR) 37.5 MG 24 hr capsule Take 1 capsule (37.5 mg) by mouth daily 30 capsule 3     venlafaxine (EFFEXOR-XR) 75 MG 24 hr capsule Take 1 capsule (75 mg) by mouth daily 30 capsule 3     BP Readings from Last 3 Encounters:   02/03/20 138/80   09/12/19 120/72   07/10/19 116/70    Wt Readings from Last 3 Encounters:   02/03/20 97.1 kg (214 lb)   09/12/19 99.3 kg (219 lb)   07/10/19 98.5 kg (217 lb 1.6 oz)                      Reviewed and updated as needed this visit by Provider         Review of Systems   ROS COMP: Constitutional, HEENT, cardiovascular, pulmonary, gi and gu systems are negative, except as  "otherwise noted.      Objective    /80 (BP Location: Right arm, Patient Position: Chair, Cuff Size: Adult Large)   Pulse 81   Temp 98.8  F (37.1  C) (Oral)   Resp 18   Ht 1.638 m (5' 4.5\")   Wt 97.1 kg (214 lb)   LMP 08/12/2012   SpO2 98%   Breastfeeding No   BMI 36.17 kg/m    Body mass index is 36.17 kg/m .  Physical Exam   GENERAL: healthy, alert and no distress  EYES: Eyes grossly normal to inspection, PERRL and conjunctivae and sclerae normal  HENT: ear canals and TM's normal, nose and mouth without ulcers or lesions  RESP: lungs clear to auscultation - no rales, rhonchi or wheezes  CV: regular rate and rhythm, normal S1 S2, no S3 or S4, no murmur, click or rub, no peripheral edema and peripheral pulses strong    Diagnostic Test Results:  Labs reviewed in Epic        Assessment & Plan     1. Acute maxillary sinusitis, recurrence not specified    - amoxicillin (AMOXIL) 875 MG tablet; Take 1 tablet (875 mg) by mouth 2 times daily for 10 days  Dispense: 20 tablet; Refill: 0     Tobacco Cessation:   reports that she has been smoking cigarettes. She has a 7.50 pack-year smoking history. She has never used smokeless tobacco.        BMI:   Estimated body mass index is 36.17 kg/m  as calculated from the following:    Height as of this encounter: 1.638 m (5' 4.5\").    Weight as of this encounter: 97.1 kg (214 lb).           Patient Instructions   (J01.00) Acute maxillary sinusitis, recurrence not specified  (primary encounter diagnosis)  Comment:   Plan: amoxicillin (AMOXIL) 875 MG tablet            The patient is advised to push fluids, rest, gargle warm salt water, use vaporizer or mist needed  and use acetaminophen, ibuprofen as needed.      Please follow-up if symptoms fail to resolve or worsen        No follow-ups on file.    Ramona Ann Aaseby-Aguilera, PA-C  [facility]        "

## 2020-02-12 ENCOUNTER — MYC MEDICAL ADVICE (OUTPATIENT)
Dept: FAMILY MEDICINE | Facility: CLINIC | Age: 55
End: 2020-02-12

## 2020-02-12 DIAGNOSIS — B37.0 THRUSH: Primary | ICD-10-CM

## 2020-02-12 DIAGNOSIS — B37.31 YEAST INFECTION OF THE VAGINA: ICD-10-CM

## 2020-02-13 RX ORDER — FLUCONAZOLE 150 MG/1
150 TABLET ORAL ONCE
Qty: 1 TABLET | Refills: 0 | Status: SHIPPED | OUTPATIENT
Start: 2020-02-13 | End: 2020-04-13

## 2020-02-13 RX ORDER — NYSTATIN 100000/ML
500000 SUSPENSION, ORAL (FINAL DOSE FORM) ORAL 4 TIMES DAILY
Qty: 400 ML | Refills: 0 | Status: SHIPPED | OUTPATIENT
Start: 2020-02-13 | End: 2020-07-09

## 2020-04-11 DIAGNOSIS — N95.1 MENOPAUSAL FLUSHING: ICD-10-CM

## 2020-04-13 ENCOUNTER — TELEPHONE (OUTPATIENT)
Dept: FAMILY MEDICINE | Facility: CLINIC | Age: 55
End: 2020-04-13

## 2020-04-13 ENCOUNTER — MYC MEDICAL ADVICE (OUTPATIENT)
Dept: NURSING | Facility: CLINIC | Age: 55
End: 2020-04-13

## 2020-04-13 DIAGNOSIS — M54.30 SCIATIC LEG PAIN: ICD-10-CM

## 2020-04-13 RX ORDER — VENLAFAXINE HYDROCHLORIDE 75 MG/1
75 CAPSULE, EXTENDED RELEASE ORAL DAILY
Qty: 90 CAPSULE | Refills: 1 | Status: SHIPPED | OUTPATIENT
Start: 2020-04-13 | End: 2021-03-08

## 2020-04-13 RX ORDER — VENLAFAXINE HYDROCHLORIDE 37.5 MG/1
37.5 CAPSULE, EXTENDED RELEASE ORAL DAILY
Qty: 90 CAPSULE | Refills: 1 | Status: SHIPPED | OUTPATIENT
Start: 2020-04-13 | End: 2021-03-08

## 2020-04-13 RX ORDER — GABAPENTIN 600 MG/1
600 TABLET ORAL 2 TIMES DAILY
Qty: 180 TABLET | Refills: 3 | Status: SHIPPED | OUTPATIENT
Start: 2020-04-13 | End: 2020-04-14

## 2020-04-13 RX ORDER — GABAPENTIN 600 MG/1
600 TABLET ORAL 3 TIMES DAILY
Qty: 180 TABLET | Refills: 3 | Status: CANCELLED | OUTPATIENT
Start: 2020-04-13

## 2020-04-13 NOTE — TELEPHONE ENCOUNTER
Last I had documented, she was taking 600 mg BID, not TID. I have changed script in Epic. Please call to verify with her. I am guessing this was pharmacy renewal as last script said TIDEV. JH

## 2020-04-13 NOTE — TELEPHONE ENCOUNTER
Routing refill request to provider for review/approval because:  Labs not current:  ARGELIA Whittington RN, BSN

## 2020-04-13 NOTE — TELEPHONE ENCOUNTER
RX [place] ([place]) reviewed:  reviewed- no concerns    [place]:  [email or web address]    Gabapentin      Last Written Prescription Date:  7/11/19  Last Fill Quantity: 180,   # refills: 3  Last Office Visit: 2/3/2020  Future Office visit:       Routing refill request to provider for review/approval because:  Drug not on the [facility], UMP or  Health refill protocol or controlled substance

## 2020-04-14 RX ORDER — GABAPENTIN 600 MG/1
600 TABLET ORAL 2 TIMES DAILY
Qty: 270 TABLET | Refills: 3 | Status: SHIPPED | OUTPATIENT
Start: 2020-04-14 | End: 2020-06-16

## 2020-04-27 RX ORDER — GABAPENTIN 600 MG/1
600 TABLET ORAL 3 TIMES DAILY
Qty: 270 TABLET | Refills: 3 | Status: SHIPPED | OUTPATIENT
Start: 2020-04-27 | End: 2021-08-02

## 2020-04-27 NOTE — TELEPHONE ENCOUNTER
Delon with [facility] calling to verify dosing on gabapentin.  The directions and qty don't match.  The qty is for TID dosing.     Please send in a new script with correct sig or qty    Graeme Whittington RN, BSN

## 2020-06-12 ENCOUNTER — E-VISIT (OUTPATIENT)
Dept: FAMILY MEDICINE | Facility: CLINIC | Age: 55
End: 2020-06-12
Payer: COMMERCIAL

## 2020-06-12 DIAGNOSIS — Z53.9 ERRONEOUS ENCOUNTER--DISREGARD: Primary | ICD-10-CM

## 2020-06-14 ENCOUNTER — NURSE TRIAGE (OUTPATIENT)
Dept: NURSING | Facility: CLINIC | Age: 55
End: 2020-06-14

## 2020-06-14 NOTE — TELEPHONE ENCOUNTER
Lower back pain for over two weeks now.  She has had sciatica in the past and this is similar. The R side pain is > L side pain.  I connected with scheduling to set up an appointment within the next three days.  Laura Arreguin RN  [facility]      Additional Information    Negative: Passed out (i.e., lost consciousness, collapsed and was not responding)    Negative: Shock suspected (e.g., cold/pale/clammy skin, too weak to stand, low BP, rapid pulse)    Negative: Sounds like a life-threatening emergency to the triager    Negative: Major injury to the back (e.g., MVA, fall > 10 feet or 3 meters, penetrating injury, etc.)    Negative: Followed a tailbone injury    Negative: [1] Pain in the upper back over the ribs (rib cage) AND [2] radiates (travels, goes) into chest    Negative: [1] Pain in the upper back over the ribs (rib cage) AND [2] worsened by coughing (or clearly increases with breathing)    Negative: Back pain during pregnancy    Negative: Pain mainly in flank (i.e., in the side, over the lower ribs or just below the ribs)    Negative: [1] SEVERE back pain (e.g., excruciating) AND [2] sudden onset AND [3] age > 60    Negative: [1] Unable to urinate (or only a few drops) > 4 hours AND [2] bladder feels very full (e.g., palpable bladder or strong urge to urinate)    Negative: [1] Loss of bladder or bowel control (urine or bowel incontinence; wetting self, leaking stool) AND [2] new onset    Negative: Numbness in groin or rectal area (i.e., loss of sensation)    Negative: [1] SEVERE abdominal pain AND [2] present > 1 hour    Negative: [1] Abdominal pain AND [2] age > 60    Negative: Weakness of a leg or foot (e.g., unable to bear weight, dragging foot)    Negative: Unable to walk    Negative: Patient sounds very sick or weak to the triager    Negative: [1] SEVERE back pain (e.g., excruciating, unable to do any normal activities) AND [2] not improved 2 hours after pain medicine     Using Naproxyn  "and Tylenol. It helps take the edge off.  Pain at 6    Negative: [1] Pain radiates into the thigh or further down the leg AND [2] both legs     Goes down buttocks on right    Negative: [1] Fever > 100.0 F (37.8 C) AND [2] flank pain (i.e., in side, below ribs and above hip)    Negative: [1] Pain or burning with passing urine (urination) AND [2] flank pain (i.e., in side, below ribs and above hip)    Negative: Numbness in a leg or foot (i.e., loss of sensation)    Negative: [1] Numbness in an arm or hand (i.e., loss of sensation) AND [2] upper back pain    Negative: High-risk adult (e.g., history of cancer, HIV, or IV drug abuse)    Negative: [1] Fever AND [2] no symptoms of UTI  (Exception: has generalized muscle pains, not localized back pain)    Negative: Rash in same area as pain (may be described as \"small blisters\")    Negative: Blood in urine (red, pink, or tea-colored)    [1] MODERATE back pain (e.g., interferes with normal activities) AND [2] present > 3 days    Protocols used: BACK PAIN-A-AH      "

## 2020-06-16 ENCOUNTER — OFFICE VISIT (OUTPATIENT)
Dept: FAMILY MEDICINE | Facility: CLINIC | Age: 55
End: 2020-06-16
Payer: COMMERCIAL

## 2020-06-16 VITALS
TEMPERATURE: 98.1 F | SYSTOLIC BLOOD PRESSURE: 118 MMHG | BODY MASS INDEX: 35.16 KG/M2 | WEIGHT: 211 LBS | OXYGEN SATURATION: 98 % | HEART RATE: 85 BPM | DIASTOLIC BLOOD PRESSURE: 76 MMHG | HEIGHT: 65 IN

## 2020-06-16 DIAGNOSIS — M79.18 PAIN IN RIGHT BUTTOCK: Primary | ICD-10-CM

## 2020-06-16 PROCEDURE — 99213 OFFICE O/P EST LOW 20 MIN: CPT | Performed by: FAMILY MEDICINE

## 2020-06-16 RX ORDER — CYCLOBENZAPRINE HCL 5 MG
5 TABLET ORAL 3 TIMES DAILY PRN
Qty: 30 TABLET | Refills: 0 | Status: SHIPPED | OUTPATIENT
Start: 2020-06-16 | End: 2020-07-08

## 2020-06-16 ASSESSMENT — ENCOUNTER SYMPTOMS
NUMBNESS: 0
WEAKNESS: 0
BACK PAIN: 1

## 2020-06-16 ASSESSMENT — MIFFLIN-ST. JEOR: SCORE: 1555.03

## 2020-06-16 NOTE — PROGRESS NOTES
"Subjective     Arabella Hart is a 55 year old female who presents to clinic today for the following health issues:    HPI   Musculoskeletal problem/pain      Duration: on and off    Description  Location: right side of body, sciatic nerve ???? Feels compressed, burning    Intensity:  moderate    Accompanying signs and symptoms: up and down on her right side    History  Previous similar problem: YES- will discuss  Previous evaluation:  none    Precipitating or alleviating factors:  Trauma or overuse: YES- works at [employer]  Aggravating factors include: walking, lifting, exercise and overuse    Therapies tried and outcome: ice, stretch, gabepentin    Patient is a pleasant 55-year-old female who has concerns of buttocks and back pain.  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  Try conservative measures, listed above, which has not helped her.    Reviewed and updated as needed this visit by Provider         Review of Systems   Musculoskeletal: Positive for back pain.   Neurological: Negative for weakness and numbness.            Objective    /76 (BP Location: Right arm, Patient Position: Chair, Cuff Size: Adult Regular)   Pulse 85   Temp 98.1  F (36.7  C) (Oral)   Ht 1.638 m (5' 4.5\")   Wt 95.7 kg (211 lb)   LMP 08/12/2012   SpO2 98%   BMI 35.66 kg/m    Body mass index is 35.66 kg/m .  Physical Exam  Musculoskeletal:         General: Tenderness present.      Comments: Tenderness palpation of the right SI area and right gluteal region.  Leg lengths are normal, negative logroll test.  No tenderness over the greater trochanteric regions.   Neurological:      General: No focal deficit present.        Diagnostic Test Results:  Labs reviewed in Epic        Assessment & Plan     1. Pain in right buttock  Likely related to gluteal strain/spasm and possible concomitant piriformis syndrome.  Recommend start physical therapy, start " Flexeril for symptom control.  If symptoms not improve per month, may consider MRI.  - PHYSICAL THERAPY REFERRAL; Future  - cyclobenzaprine (FLEXERIL) 5 MG tablet; Take 1 tablet (5 mg) by mouth 3 times daily as needed for muscle spasms  Dispense: 30 tablet; Refill: 0       Return in about 1 week (around 6/23/2020) for If symptoms do not improve or gets worse..    Kvng Barkley MD  [facility]    Documentation was prepared using Dragon voice recognition software. Please excuse typographical errors. Please contact me if documentation is unclear.

## 2020-06-18 ENCOUNTER — VIRTUAL VISIT (OUTPATIENT)
Dept: PHYSICAL THERAPY | Facility: CLINIC | Age: 55
End: 2020-06-18
Attending: FAMILY MEDICINE
Payer: COMMERCIAL

## 2020-06-18 DIAGNOSIS — M79.18 PAIN IN RIGHT BUTTOCK: ICD-10-CM

## 2020-06-18 PROCEDURE — 97110 THERAPEUTIC EXERCISES: CPT | Mod: GT | Performed by: PHYSICAL THERAPIST

## 2020-06-18 PROCEDURE — 97162 PT EVAL MOD COMPLEX 30 MIN: CPT | Mod: GT | Performed by: PHYSICAL THERAPIST

## 2020-06-18 NOTE — PROGRESS NOTES
"Physical Therapy Virtual Follow Up Visit      The patient has been notified of following:     \"This virtual visit will be conducted between you and your provider. We have found that certain health care needs can be provided without the need for physical presence.  This service lets us provide the care you need with a virtual visit.\"    Due to external, as well as internal [facility] management of the COVID-19 Virus, Arabella Hart was not seen in our clinic.  As a substitution, we implemented a virtual visit to manage this patient's condition utilizing the [employer] virtual visit platform via the patient s existing code.  The provider, Osmani Price, reviewed the patient's chart, PTRx prescription, and spoke with the patient to determine the following telemedicine visit is appropriate and effective for the patient's care.    The following type of visit was completed:   Video Visit:  The [employer] platform uses a synchronous HIPAA compliant video stream for this patient encounter.          [facility]: Physical Therapy Initial Evaluation   Jun 18, 2020    Subjective:   Chief Complaint: right buttock pain   Pain: right buttock more than left. Can go into the thigh and into the low back. Sometimes gets a burning sensation in the back of the thigh. Also has pain at the outside of the hip.    Numbness/Tingling: None   Weakness: None   Stiffness: Some in the morning  New/Recurrent/Chronic: Recurrent  DOI/onset: June 2020   Referral Date: 6/16/2020  - Kvng Barkley MD  Mechanism of onset: worsened by a double shift  PMH/surgical history/trauma:    - osteoarthritis - knees (right more than left)    - menopausal   - overweight   - smoking   - thyroid problems   - Surgical history: hysterectomy, gall bladder removal  General health as reported by patient: Good  Medications: Anti-depressants, Anti-inflammatory (Naproxen), Muscle Relaxants, Thyroid, gabapentin (for menopausal hot flashes),   Occupation: Nurse " Job duties: prolonged sitting, prolonged standing,   Previous Treatment (Effect): muscle relaxer (helpful, particularly with sleep) ;   Imaging: No recent imaging  AM/PM: at night, can't find a comfortable spot.   Quality of Pain: burning,   Pain: 4/10 at present, 2/10 at best, 7/10 at worst  Worse: sitting, getting out of bed and walking around in the morning,   Better: salve, stretches (knees to chest), spinal decompression (push/lifting up with her arms),   Progression of Symptoms since onset: staying the same   Sleeping: Can't sleep on the left side   Current Functional Status:   - sitting - can sit 30-45 minutes max before needing to get up. Always hurts when she is sitting.    - stairs - uses a handrail, going up the stairs is worse, pain when leading with the right foot. Going down stairs will bother her if carrying some weight.   Previous Functional Status: No restrictions  Current HEP/exercise regimen: walking (30 minutes, few times per week)  Live with Others: Lives with , no pets, daughter and fiance temporarily live with them    Patient's goal(s): Feel better and have this pain not be so annoying. Improve quality of life (sleep better, feel better),       Objective:    Functional:  - Squat: WNL    Lumbar AROM: (Major, Moderate, Minimal or Nil loss)  Movement Loss Obie Mod Min Nil Pain   Flexion   x x    Extension  x x  burning   Side Gliding L  x x  cc   Side Gliding R  x x  cc       Dural Signs: Attempted to  patient through a slump test, which came up negative. Uncertain as to how effectively it was performed without having been in person.       Repeated movement testing:   (During: produces, abolishes, increases, decreases, no effect, centralizing, peripheralizing; After: better, worse, no better, no worse, no effect, centralized, peripheralized)       Symptoms During Symptoms After ROM increased ROM decreased No Effect   Prone Lying same same      MARIAM better better        Other:   -  Patient reported stretch sensation with all exercises given today.       PTRx Content from today's visit:  Exercise Name: Single Knee to Chest, Sets: 5 - Reps: 10 seconds - Sessions: 2-3  Exercise Name: Supine Butterfly, Sets: 5 - Reps: 10 seconds - Sessions: 2-3, Notes: Use support under the legs to allow the muscles to relax into the stretch.   Exercise Name: Supine Hamstring Stretch, Sets: 5 - Reps: 10 seconds - Sessions: 2-3, Notes: Use a belt or strap around the foot instead of hands around the thigh.   Exercise Name: Prone On Elbows, Sets: 1 - Reps: 2 minutes - Sessions: 2-3  Exercise Name: Posture Correction with Lumbar Roll          Assessment/Plan:    Patient is a 55 year old female with lumbar and right buttocks complaints.    Patient has the following significant findings with corresponding treatment plan.                Referring Diagnosis: Pain in right buttock   Pain -  hot/cold therapy, manual therapy, splint/taping/bracing/orthotics, self management, education, directional preference exercise and home program  Decreased ROM/flexibility - manual therapy, therapeutic exercise, therapeutic activity and home program  Decreased function - therapeutic activities and home program      Therapy Evaluation Codes:   1) History comprised of:   Personal factors that impact the plan of care:      Profession.    Comorbidity factors that impact the plan of care are:      Osteoarthritis, Overweight and History of Abdominal Surgeries.     Medications impacting care: Anti-inflammatory, Muscle relaxant and Pain.  2) Examination of Body Systems comprised of:   Body structures and functions that impact the plan of care:      Hip, Lumbar spine and Pelvis.   Activity limitations that impact the plan of care are:      Sitting, Stairs and Sleeping.  3) Clinical presentation characteristics are:   Evolving/Changing.  4) Decision-Making    Moderate complexity using standardized patient assessment instrument and/or measureable  assessment of functional outcome.  Cumulative Therapy Evaluation is: Moderate complexity.    Previous and current functional limitations:  (See Goal Flow Sheet for this information)    Short term and Long term goals: (See Goal Flow Sheet for this information)     Communication ability:  Patient appears to be able to clearly communicate and understand verbal and written communication and follow directions correctly.  Treatment Explanation - The following has been discussed with the patient:   RX ordered/plan of care  Anticipated outcomes  Possible risks and side effects  This patient would benefit from PT intervention to resume normal activities.   Rehab potential is good.    Frequency:  1 X week, once daily  Duration:  for 8 weeks  Discharge Plan:  Achieve all LTG.  Independent in home treatment program.  Reach maximal therapeutic benefit.    Please refer to the daily flowsheet for treatment today, total treatment time and time spent performing 1:1 timed codes.         Virtual visit contact time    Time of service began: 10:20 AM  Time of service ended: 10:57 AM  Total Time for set up, visit, and documentation: 20 minutes    Payor: [employer] / Plan: [employer] [place] [employer] / Product Type: PPO /   .  Procedure Code/s   Therapeutic Exercise (28875): 11 minutes    I have reviewed the note as documented above.  This accurately captures the substance of my conversation with the patient.  Provider location: [place], [place] ([facility])  Patient location: [place], [place]

## 2020-06-22 PROBLEM — M79.18 PAIN IN RIGHT BUTTOCK: Status: ACTIVE | Noted: 2020-06-22

## 2020-07-02 ENCOUNTER — THERAPY VISIT (OUTPATIENT)
Dept: PHYSICAL THERAPY | Facility: CLINIC | Age: 55
End: 2020-07-02
Payer: COMMERCIAL

## 2020-07-02 DIAGNOSIS — M79.18 PAIN IN RIGHT BUTTOCK: ICD-10-CM

## 2020-07-02 PROCEDURE — 97110 THERAPEUTIC EXERCISES: CPT | Mod: GP | Performed by: PHYSICAL THERAPIST

## 2020-07-08 ENCOUNTER — MYC REFILL (OUTPATIENT)
Dept: FAMILY MEDICINE | Facility: CLINIC | Age: 55
End: 2020-07-08

## 2020-07-08 DIAGNOSIS — M79.18 PAIN IN RIGHT BUTTOCK: ICD-10-CM

## 2020-07-08 RX ORDER — CYCLOBENZAPRINE HCL 5 MG
5 TABLET ORAL 3 TIMES DAILY PRN
Qty: 30 TABLET | Refills: 0 | Status: SHIPPED | OUTPATIENT
Start: 2020-07-08 | End: 2020-07-09

## 2020-07-08 NOTE — TELEPHONE ENCOUNTER
Routing refill request to provider for review/approval because:  Drug not on the [facility] refill protocol   Graeme Whittington RN, BSN

## 2020-07-09 ENCOUNTER — OFFICE VISIT (OUTPATIENT)
Dept: FAMILY MEDICINE | Facility: CLINIC | Age: 55
End: 2020-07-09
Payer: COMMERCIAL

## 2020-07-09 ENCOUNTER — THERAPY VISIT (OUTPATIENT)
Dept: PHYSICAL THERAPY | Facility: CLINIC | Age: 55
End: 2020-07-09
Payer: COMMERCIAL

## 2020-07-09 VITALS
RESPIRATION RATE: 16 BRPM | SYSTOLIC BLOOD PRESSURE: 118 MMHG | OXYGEN SATURATION: 97 % | BODY MASS INDEX: 35.99 KG/M2 | DIASTOLIC BLOOD PRESSURE: 76 MMHG | TEMPERATURE: 98.3 F | HEIGHT: 65 IN | WEIGHT: 216 LBS | HEART RATE: 92 BPM

## 2020-07-09 DIAGNOSIS — M79.18 PAIN IN RIGHT BUTTOCK: ICD-10-CM

## 2020-07-09 DIAGNOSIS — M54.41 ACUTE RIGHT-SIDED LOW BACK PAIN WITH RIGHT-SIDED SCIATICA: Primary | ICD-10-CM

## 2020-07-09 DIAGNOSIS — G57.01 PIRIFORMIS SYNDROME, RIGHT: ICD-10-CM

## 2020-07-09 PROCEDURE — 99213 OFFICE O/P EST LOW 20 MIN: CPT | Performed by: FAMILY MEDICINE

## 2020-07-09 PROCEDURE — 97110 THERAPEUTIC EXERCISES: CPT | Mod: GP | Performed by: PHYSICAL THERAPIST

## 2020-07-09 PROCEDURE — 97112 NEUROMUSCULAR REEDUCATION: CPT | Mod: GP | Performed by: PHYSICAL THERAPIST

## 2020-07-09 PROCEDURE — 97140 MANUAL THERAPY 1/> REGIONS: CPT | Mod: GP | Performed by: PHYSICAL THERAPIST

## 2020-07-09 RX ORDER — CYCLOBENZAPRINE HCL 5 MG
5 TABLET ORAL 2 TIMES DAILY PRN
Qty: 30 TABLET | Refills: 0 | Status: SHIPPED | OUTPATIENT
Start: 2020-07-09 | End: 2021-03-08

## 2020-07-09 RX ORDER — CYCLOBENZAPRINE HCL 10 MG
10 TABLET ORAL
Qty: 30 TABLET | Refills: 1 | Status: SHIPPED | OUTPATIENT
Start: 2020-07-09 | End: 2021-03-08

## 2020-07-09 ASSESSMENT — MIFFLIN-ST. JEOR: SCORE: 1577.71

## 2020-07-09 NOTE — PROGRESS NOTES
Objective:    HIP: (* indicates patient's pain)   ROM R ROM L MMT R MMT L   Flexion 120* 109 5 5   IR 37 26 5 5   ER 30 25 ** 4   abduction 30 25 4 3+   Extension WNL WNL ** 5     Palpation: mild tightness in the right glute, focal to the piriformis mapping.     Other:   - impaired glute activation on the left compared to the right.

## 2020-07-09 NOTE — PROGRESS NOTES
Subjective     Arabella Hart is a 55 year old female who presents to clinic today for the following health issues:    HPI   Musculoskeletal problem/pain- relapse      Duration: x 6 weeks    Description  Location: relapse of her SI joint/hamstring on her right side-was told to come back if not better    Intensity:  moderate    Accompanying signs and symptoms: whole length of her  Leg has pain    History  Previous similar problem: YES- many years ago- work comp in the  past  Previous evaluation:  none    Precipitating or alleviating factors:  Trauma or overuse: YES- just walking out of [employer]  Aggravating factors include: sitting    Therapies tried and outcome: heat and physical therapy      Patient Active Problem List   Diagnosis     Allergic rhinitis     Uric acid kidney stones     S/P hysterectomy     Hypothyroidism due to Hashimoto's thyroiditis     Irritable bowel syndrome without diarrhea     NAGI (generalized anxiety disorder)     Menopausal flushing     Tobacco use disorder     Pain in right buttock     Past Surgical History:   Procedure Laterality Date     C LIGATE FALLOPIAN TUBE,POSTPARTUM  1998    Tubal Ligation     C NONSPECIFIC PROCEDURE  1983    Right Ankle Surgery     COLONOSCOPY N/A 12/11/2018    Procedure: COLONOSCOPY;  Surgeon: Vishnu Mandujano MD;  Location:  GI     LAPAROSCOPIC CHOLECYSTECTOMY N/A 6/4/2015    Procedure: LAPAROSCOPIC CHOLECYSTECTOMY;  Surgeon: Maria Fernanda Regalado MD;  Location: [facility] OR     LAPAROSCOPIC HYSTERECTOMY TOTAL, BILATERAL SALPINGO-OOPHORECTOMY, COMBINED  8/14/2012    Procedure: COMBINED LAPAROSCOPIC HYSTERECTOMY TOTAL, SALPINGO-OOPHORECTOMY;  LAPAROSCOPIC HYSTERECTOMY TOTAL, Bilateral SALPINGO-OOPHORECTOMY, pelvic exam under anesthesia;  Surgeon: Jolene Baez MD;  Location: [facility] OR       Social History     Tobacco Use     Smoking status: Current Some Day Smoker     Packs/day: 0.50     Years: 15.00     Pack years: 7.50     Types: Cigarettes     Smokeless  "tobacco: Never Used   Substance Use Topics     Alcohol use: Yes     Comment: 1-2 drinks seldom     Family History   Problem Relation Age of Onset     Breast Cancer Paternal Grandmother      Thyroid Disease Mother         Hypothyroid     Cancer Mother         Ovarian - age 61     Coronary Artery Disease Paternal Grandfather      Hypertension Father         Heart complication after MI.       Cancer Father         Throat (Epiglottis) smoker     Heart Disease Father          of heart attack     Coronary Artery Disease Father      Breast Cancer Paternal Aunt         and P great aunt     Breast Cancer Paternal Aunt      Coronary Artery Disease Brother      Colon Cancer No family hx of          Reviewed and updated as needed this visit by Provider         Review of Systems   MUSCULOSKELETAL: as above  NEURO: Denies numbness      Objective    /76 (BP Location: Right arm, Patient Position: Chair, Cuff Size: Adult Regular)   Pulse 92   Temp 98.3  F (36.8  C) (Oral)   Resp 16   Ht 1.638 m (5' 4.5\")   Wt 98 kg (216 lb)   LMP 2012   SpO2 97%   BMI 36.50 kg/m    Body mass index is 36.5 kg/m .  Physical Exam   GENERAL: healthy, alert and no distress  MS: Hip and back normal range of motion, mild tenderness at right SI joint, tenderness at sciatic notch, no significant tenderness at hamstring insertion or ischio bursa, straight leg test normal  NEURO: normal strength and sensation right leg        Assessment & Plan     1. Acute right-sided low back pain with right-sided sciatica  Appears to still be sciatica with possible piriformis syndrome.  Discussed continuing with physical therapy and conservative management.  Possibility of issue bursitis versus hamstring strain or tendinopathy versus other.  Follow-up with primary care doctor in 4 to 6 weeks to review if not improving with current treatment.  Has had improvement with muscle relaxers so we will continue with medication below.  - " cyclobenzaprine (FLEXERIL) 10 MG tablet; Take 1 tablet (10 mg) by mouth nightly as needed for muscle spasms  Dispense: 30 tablet; Refill: 1    2. Piriformis syndrome, right  - cyclobenzaprine (FLEXERIL) 10 MG tablet; Take 1 tablet (10 mg) by mouth nightly as needed for muscle spasms  Dispense: 30 tablet; Refill: 1    3. Pain in right buttock  - cyclobenzaprine (FLEXERIL) 5 MG tablet; Take 1 tablet (5 mg) by mouth 2 times daily as needed for muscle spasms  Dispense: 30 tablet; Refill: 0     Tobacco Cessation:   reports that she has been smoking cigarettes. She has a 7.50 pack-year smoking history. She has never used smokeless tobacco.          Return in about 6 weeks (around 8/20/2020) for routine physical, with PCP.    Elian Hernandez MD  [facility]

## 2020-07-14 ENCOUNTER — TELEPHONE (OUTPATIENT)
Dept: FAMILY MEDICINE | Facility: CLINIC | Age: 55
End: 2020-07-14

## 2020-07-14 NOTE — TELEPHONE ENCOUNTER
LMRTC- per Dr. Hernandez he wanted patient to:    Return in about 6 weeks (around 8/20/2020) for routine physical, with PCP.     Elian Hernandez MD  [facility]      mAina Taylor

## 2020-08-17 DIAGNOSIS — E06.3 HYPOTHYROIDISM DUE TO HASHIMOTO'S THYROIDITIS: ICD-10-CM

## 2020-08-17 RX ORDER — LEVOTHYROXINE SODIUM 150 UG/1
150 TABLET ORAL DAILY
Qty: 90 TABLET | Refills: 0 | Status: SHIPPED | OUTPATIENT
Start: 2020-08-17 | End: 2021-08-02

## 2020-08-17 NOTE — TELEPHONE ENCOUNTER
Medication is being filled for 1 time refill only due to:  Patient needs to be seen because it has been more than one year since last visit.     LM for call back     Pt needs PX and labs.   Nathalie Bennett RN

## 2020-08-18 ENCOUNTER — E-VISIT (OUTPATIENT)
Dept: FAMILY MEDICINE | Facility: CLINIC | Age: 55
End: 2020-08-18
Payer: COMMERCIAL

## 2020-08-18 DIAGNOSIS — Z20.822 EXPOSURE TO COVID-19 VIRUS: Primary | ICD-10-CM

## 2020-08-18 PROCEDURE — 99421 OL DIG E/M SVC 5-10 MIN: CPT | Performed by: FAMILY MEDICINE

## 2020-08-18 NOTE — PATIENT INSTRUCTIONS
Instructions for Patients  Please follow these steps:    1. We will call to schedule your test.  2. A member of our care team will ask you some questions. Then, they will use a swab to collect samples from your nose and throat.     Our testing team will send you your test results.    How can I protect others?    Stay home and away from others (self-isolate) until:    You ve had no fever--and no medicine that reduces fever--for 1 full day (24 hours). And      Your other symptoms have resolved (gotten better). For example, your cough or breathing has improved. And     At least 10 days have passed since your symptoms started.    Stay at least 6 feet away from others. (If someone will drive you to your test, stay in the backseat, as far away from the  as you can.)     Don t go to work, school or anywhere else. When it s time for your test, go straight to the testing site. Don t make any stops on the way there or back.     Wash your hands and face often. Use soap and water.     Cover your mouth and nose with a mask, tissue or washcloth.     Don t touch anyone. No hugging, kissing or handshakes.    How can I take care of myself?    1. Get lots of rest. Drink extra fluids (unless a doctor has told you not to).     2. Take Tylenol (acetaminophen) for fever or pain. If you have liver or kidney problems, ask your family doctor if it's okay to take Tylenol.     Adults can take either:     650 mg (two 325 mg pills) every 4 to 6 hours, or     1,000 mg (two 500 mg pills) every 8 hours as needed.     Note: Don't take more than 3,000 mg in one day.   Acetaminophen is found in many medicines (both prescribed and over-the-counter medicines). Read all labels to be sure you don't take too much.   For children, check the Tylenol bottle for the right dose. The dose is based on  the child's age or weight.    3. If you have other health problems (like cancer, heart failure, an organ transplant or severe kidney disease): Call your  specialty clinic if you don't feel better in the next 2 days.    4. Know when to call 911: If your breathing is so bad that it keeps you from doing normal activities, call 911 or go to the emergency room. Tell them that you've been staying home and may have COVID-19.      Thank you for limiting contact with others, wearing a simple mask to cover your cough, practice good hand hygiene habits and accessing our virtual services where possible to limit the spread of this virus.    For more information about COVID19 and options for caring for yourself at home, please visit the CDC website at https://www.cdc.gov/coronavirus/2019-ncov/about/steps-when-sick.html  For more options for care at [facility], please visit our website at [email or web address]

## 2020-08-19 DIAGNOSIS — Z20.822 EXPOSURE TO COVID-19 VIRUS: ICD-10-CM

## 2020-08-19 PROCEDURE — U0003 INFECTIOUS AGENT DETECTION BY NUCLEIC ACID (DNA OR RNA); SEVERE ACUTE RESPIRATORY SYNDROME CORONAVIRUS 2 (SARS-COV-2) (CORONAVIRUS DISEASE [COVID-19]), AMPLIFIED PROBE TECHNIQUE, MAKING USE OF HIGH THROUGHPUT TECHNOLOGIES AS DESCRIBED BY CMS-2020-01-R: HCPCS | Performed by: FAMILY MEDICINE

## 2020-08-20 LAB
SARS-COV-2 RNA SPEC QL NAA+PROBE: NOT DETECTED
SPECIMEN SOURCE: NORMAL

## 2020-08-27 NOTE — PROGRESS NOTES
"DISCHARGE REPORT    Updated as of August 27, 2020  Progress reporting period is from Jun 18, 2020 to Jul 9, 2020.       SUBJECTIVE  Subjective changes noted by patient:  Unknown. Patient has failed to return to clinic.    Current pain level is unknown. Patient has failed to return to clinic.  .     Initial Pain level: 7/10.   Changes in function:  Unknown. Patient has failed to return to clinic.  Adverse reaction to treatment or activity: Unknown. Patient has failed to return to clinic.    OBJECTIVE  Changes noted in objective findings:  Patient has failed to return to therapy so current objective findings are unknown.    ASSESSMENT/PLAN  Updated problem list and treatment plan: Diagnosis 1:  Pain in right buttock   STG/LTGs have been met or progress has been made towards goals:  Unknown. Patient has failed to return to clinic.  Assessment of Progress: The patient has not returned to therapy. Current status is unknown.  Self Management Plans:  Patient has been instructed in a home treatment program.  Patient continues to require the following intervention to meet STG and LTG's:  Unknown. Patient has failed to return to clinic.    Recommendations:  Patient states that she I \"ok\". She does fine at work, but still gets symptoms getting out of her car after a long car ride and often in the middle of the night. She does not wish to pursue additional physical therapy at this time, though it is believed by this therapist that it could be beneficial to address these remaining symptoms.     Please refer to the daily flowsheet for treatment today, total treatment time and time spent performing 1:1 timed codes.  "

## 2020-10-16 PROBLEM — M79.18 PAIN IN RIGHT BUTTOCK: Status: RESOLVED | Noted: 2020-06-22 | Resolved: 2020-10-16

## 2020-12-11 NOTE — PROGRESS NOTES
This encounter was created in error - please disregard.   HISTORY OF PRESENT ILLNESS:    Arabella Hart is a 53 year old female who is seen in consultation at the request of Dr. Hernandez for right knee pain. Patient reports gradual onset of pain progressively worsening over the last 3 months, and most severe pain over that last 1 month.  Patient does recall a fall 1 year ago where she 2 stairs, but does not recall having significant knee pain afterwards.   Patient works as a nurse.     Present symptoms: Anterolateral knee pain. Pain can be burning, sharp, aching, and throbbing.  Constant swelling of the right knee.  Patient reports frequent popping of the knee. She notes limited ROM due to swelling in the knee. Patient reports bouts of instability, with no recent falls.     Treatments tried to this point:  Ice, Rest, Elevation, Compression, 50mg Indocin   Orthopedic PMH: Right sided sciatica.     Past Medical History:   Diagnosis Date     Abnormal Papanicolaou smear of cervix and cervical HPV     colpo done     ASCUS on Pap smear 12/20/06    + HPV     History of colposcopy with cervical biopsy 1/24/07    Atypical squamous metaplasia       Past Surgical History:   Procedure Laterality Date     C LIGATE FALLOPIAN TUBE,POSTPARTUM  1998    Tubal Ligation     C NONSPECIFIC PROCEDURE  1983    Right Ankle Surgery     LAPAROSCOPIC CHOLECYSTECTOMY N/A 6/4/2015    Procedure: LAPAROSCOPIC CHOLECYSTECTOMY;  Surgeon: Maria Fernanda Regalado MD;  Location: [facility] OR     LAPAROSCOPIC HYSTERECTOMY TOTAL, BILATERAL SALPINGO-OOPHORECTOMY, COMBINED  8/14/2012    Procedure: COMBINED LAPAROSCOPIC HYSTERECTOMY TOTAL, SALPINGO-OOPHORECTOMY;  LAPAROSCOPIC HYSTERECTOMY TOTAL, Bilateral SALPINGO-OOPHORECTOMY, pelvic exam under anesthesia;  Surgeon: Jolene Baez MD;  Location: [facility] OR       Family History   Problem Relation Age of Onset     Breast Cancer Paternal Grandmother      Thyroid Disease Mother      Hypothyroid     Cancer Mother      Ovarian - age 61     Coronary Artery Disease  Paternal Grandfather      Hypertension Father      Heart complication after MI.       Cancer Father      Throat (Epiglottis) smoker     HEART DISEASE Father       of heart attack     Coronary Artery Disease Father      Breast Cancer Paternal Aunt      and P great aunt     Breast Cancer Paternal Aunt      Coronary Artery Disease Brother        Social History     Social History     Marital status:      Spouse name: N/A     Number of children: N/A     Years of education: N/A     Occupational History     Not on file.     Social History Main Topics     Smoking status: Current Some Day Smoker     Packs/day: 0.50     Years: 15.00     Types: Cigarettes     Smokeless tobacco: Never Used     Alcohol use Yes      Comment: 1-2 drinks seldom     Drug use: No     Sexual activity: Yes     Partners: Male     Birth control/ protection: Female Surgical      Comment: hysterectomy     Other Topics Concern     Not on file     Social History Narrative       Current Outpatient Prescriptions   Medication Sig Dispense Refill     ACETAMINOPHEN PO Take 325-750 mg by mouth every 6 hours as needed.         citalopram (CELEXA) 40 MG tablet TAKE 1 TABLET (40 MG) BY MOUTH DAILY 90 tablet 3     Fexofenadine HCl (ALLEGRA ALLERGY PO) Take  by mouth.         gabapentin (NEURONTIN) 300 MG capsule TAKE 2 CAPSULES (600 MG) BY MOUTH 3 TIMES DAILY 180 capsule 0     indomethacin (INDOCIN) 50 MG capsule TAKE 1 CAPSULE (50 MG) BY MOUTH 3 TIMES DAILY (WITH MEALS) 42 capsule 0     levothyroxine (SYNTHROID/LEVOTHROID) 150 MCG tablet TAKE 1 TABLET (150 MCG) BY MOUTH DAILY 90 tablet 3     levothyroxine (SYNTHROID/LEVOTHROID) 150 MCG tablet Take 1 tablet (150 mcg) by mouth daily 90 tablet 3     naproxen sodium 220 MG capsule Take 220 mg by mouth 2 times daily (with meals)         Allergies   Allergen Reactions     Ibuprofen Itching     Monosodium Glutamate      MSG=sinus headache     Percocet [Oxycodone-Acetaminophen]      Itching           REVIEW OF SYSTEMS:  CONSTITUTIONAL:  NEGATIVE for fever, chills, change in weight  INTEGUMENTARY/SKIN:  NEGATIVE for worrisome rashes, moles or lesions  EYES:  NEGATIVE for vision changes or irritation  ENT/MOUTH:  NEGATIVE for ear, mouth and throat problems  RESP:  NEGATIVE for significant cough or SOB  BREAST:  NEGATIVE for masses, tenderness or discharge  CV:  NEGATIVE for chest pain, palpitations or peripheral edema  GI:  NEGATIVE for nausea, abdominal pain, heartburn, or change in bowel habits  :  Negative   MUSCULOSKELETAL:  See HPI above  NEURO:  NEGATIVE for weakness, dizziness or paresthesias  ENDOCRINE:  NEGATIVE for temperature intolerance, skin/hair changes  HEME/ALLERGY/IMMUNE:  NEGATIVE for bleeding problems  PSYCHIATRIC:  NEGATIVE for changes in mood or affect      PHYSICAL EXAM:  /62 (BP Location: Right arm, Patient Position: Chair, Cuff Size: Adult Large)  Wt 210 lb (95.3 kg)  LMP 08/12/2012  BMI 34.95 kg/m2  Body mass index is 34.95 kg/(m^2).   GENERAL APPEARANCE: healthy, alert and no distress   HEENT: No apparent thyroid megaly. Clear sclera with normal ocular movement  RESPIRATORY: No labored breathing  SKIN: no suspicious lesions or rashes  NEURO: Normal strength and tone, mentation intact and speech normal  VASCULAR: Good pulses, and capillary refill   LYMPH: no lymphadenopathy   PSYCH:  mentation appears normal and affect normal/bright    MUSCULOSKELETAL:  Some difficulty of getting up from sitting  Mild limping when she walks  Mild to moderate effusion, right knee  Range of motion is fairly symmetrical  Moderate patellofemoral crepitus, right and mild patellofemoral crepitus, left  Pain with patellofemoral compression, more noticeable in the right  Ligaments are stable throughout the knee  Negative Caryn's  No significant medial joint line pain with a palpation  Collateral ligaments are stable  Extensor mechanism is intact     ASSESSMENT:  Patellofemoral chondromalacia  with patella maltracking  Effusion, right knee  Mild degenerative medial meniscus tear, most likely not the source of the symptoms    PLAN:  We visualized the images of the right knee performed just yesterday, September 4, 2018, and findings were explained.  Despite presence of degenerative medial meniscus tear, based on her symptoms and physical examination findings, medial meniscus tear was not felt to be the main source of her pain.  We talked about maltracking of the patellofemoral joint and chondromalacia of the lateral facet.  With understanding of our discussion, this point she opted for aspiration and cortisone injection.  What to expect from the cortisone injection was explained.  We may try another injection in 6-8 weeks if partial relief is noted.  In the long run, we talked about potential benefit of stabilizing patella sleeve, strengthening of the VMO as well as weight loss.  All the questions were answered.      Large Joint Injection/Arthocentesis  Date/Time: 9/5/2018 9:47 AM  Performed by: [facility]  Authorized by: [facility]     Indications:  Pain and osteoarthritis  Needle Size:  22 G  Guidance: landmark guided    Approach:  Anteromedial  Location:  Knee  Site:  R knee joint  Medications:  8 mL lidocaine (PF) 1 %; 2 mL Dexamethasone Sodium Phosphate 120 MG/30ML  Outcome:  Tolerated well, no immediate complications  Procedure discussed: discussed risks, benefits, and alternatives    Consent Given by:  Patient  Timeout: timeout called immediately prior to procedure    Prep: patient was prepped and draped in usual sterile fashion              Imaging Interpretation:       Recent Results (from the past 744 hour(s))   MR Knee Right w/o Contrast    Narrative    MR KNEE RIGHT WITHOUT CONTRAST September 4, 2018 8:10 AM    HISTORY: Knee effusion and pain. Acute pain of right knee.    TECHNIQUE: Axial and coronal T2 with fat suppression. Coronal T1.  Sagittal dual echo T2.    FINDINGS:   Medial  Meniscus: Moderate extrusion of the body segment. Degenerative  tearing of the posterior horn with extension of both surfaces.       Lateral Meniscus: On series 7 image 10, there is a curvilinear area of  increased signal within the posterior horn; this extends outside of  the meniscus and therefore may well be artifactual. No articular  surface tear is seen otherwise.       Anterior Cruciate Ligament: Unremarkable. No sprain or tear  identified.     Posterior Cruciate Ligament: Unremarkable. No sprain or tear  identified.     Medial Collateral Ligament: No sprain or tear identified.    Lateral Collateral Ligament Complex, Popliteus Tendon: The iliotibial  band, fibular collateral ligament, biceps femoris tendon, and  popliteus tendon are intact.    Osseous and Cartilaginous Structures: There is very mild subchondral  edema of the medial tibial plateau. This is felt to represent reactive  edema since the patient did not describe a recent injury in order to  suggest bone contusion. Mild lateral patellar subluxation. Grade 2  chondromalacia of the patella. There is also chondromalacia of the  medial trochlea which appears to have focal grade 3 involvement.    Extensor Mechanism: The quadriceps and patellar tendons are intact.  The medial and lateral patellar retinacula appear unremarkable.    Joint Space: Synovitis and moderate-prominent joint effusion. At the  anterior aspect of the notch, there is an intra-articular density  measuring 1.3 x 0.8 x 0.5 cm. This could simply represent focal  synovitis or debris, but a loose body cannot be excluded.    Additional Findings: Minimal Baker's cyst. No semimembranosus-tibial  collateral ligament or pes anserine bursitis. Moderate semimembranosus  tendinosis distally.         Impression    IMPRESSION:  1. Degenerative tearing of the posterior horn medial meniscus.  2. Patellofemoral chondromalacia. Mild lateral patellar subluxation.  3. Synovitis and moderate-prominent  effusion. Loose body cannot be  excluded.  4. Minimal Baker's cyst.  5. Moderate semimembranosus tendinosis.    MD Jax HINDS MD  Department of Orthopedic Surgery        Disclaimer: This note consists of symbols derived from keyboarding, dictation and/or voice recognition software. As a result, there may be errors in the script t have gone undetected. Please consider this when interpreting information found in this chart.

## 2021-01-15 ENCOUNTER — HEALTH MAINTENANCE LETTER (OUTPATIENT)
Age: 56
End: 2021-01-15

## 2021-03-08 ENCOUNTER — TELEPHONE (OUTPATIENT)
Dept: FAMILY MEDICINE | Facility: CLINIC | Age: 56
End: 2021-03-08

## 2021-03-08 ENCOUNTER — NURSE TRIAGE (OUTPATIENT)
Dept: NURSING | Facility: CLINIC | Age: 56
End: 2021-03-08

## 2021-03-08 ENCOUNTER — OFFICE VISIT (OUTPATIENT)
Dept: FAMILY MEDICINE | Facility: CLINIC | Age: 56
End: 2021-03-08
Payer: COMMERCIAL

## 2021-03-08 ENCOUNTER — MYC MEDICAL ADVICE (OUTPATIENT)
Dept: FAMILY MEDICINE | Facility: CLINIC | Age: 56
End: 2021-03-08

## 2021-03-08 VITALS
TEMPERATURE: 98.1 F | OXYGEN SATURATION: 97 % | BODY MASS INDEX: 35.32 KG/M2 | WEIGHT: 209 LBS | HEART RATE: 79 BPM | SYSTOLIC BLOOD PRESSURE: 130 MMHG | RESPIRATION RATE: 16 BRPM | DIASTOLIC BLOOD PRESSURE: 84 MMHG

## 2021-03-08 DIAGNOSIS — E06.3 HYPOTHYROIDISM DUE TO HASHIMOTO'S THYROIDITIS: Primary | ICD-10-CM

## 2021-03-08 DIAGNOSIS — M79.18 PAIN IN RIGHT BUTTOCK: ICD-10-CM

## 2021-03-08 DIAGNOSIS — G57.01 PIRIFORMIS SYNDROME, RIGHT: ICD-10-CM

## 2021-03-08 DIAGNOSIS — N95.1 MENOPAUSAL FLUSHING: ICD-10-CM

## 2021-03-08 DIAGNOSIS — Z11.4 SCREENING FOR HIV (HUMAN IMMUNODEFICIENCY VIRUS): ICD-10-CM

## 2021-03-08 DIAGNOSIS — Z23 NEED FOR TDAP VACCINATION: ICD-10-CM

## 2021-03-08 DIAGNOSIS — M54.41 ACUTE RIGHT-SIDED LOW BACK PAIN WITH RIGHT-SIDED SCIATICA: ICD-10-CM

## 2021-03-08 DIAGNOSIS — Z11.59 NEED FOR HEPATITIS C SCREENING TEST: ICD-10-CM

## 2021-03-08 DIAGNOSIS — Z12.31 ENCOUNTER FOR SCREENING MAMMOGRAM FOR BREAST CANCER: ICD-10-CM

## 2021-03-08 LAB
HCV AB SERPL QL IA: NONREACTIVE
HIV 1+2 AB+HIV1 P24 AG SERPL QL IA: NONREACTIVE
T4 FREE SERPL-MCNC: 0.6 NG/DL (ref 0.76–1.46)
TSH SERPL DL<=0.005 MIU/L-ACNC: 52.24 MU/L (ref 0.4–4)

## 2021-03-08 PROCEDURE — 84439 ASSAY OF FREE THYROXINE: CPT | Performed by: PHYSICIAN ASSISTANT

## 2021-03-08 PROCEDURE — 86803 HEPATITIS C AB TEST: CPT | Performed by: PHYSICIAN ASSISTANT

## 2021-03-08 PROCEDURE — 99214 OFFICE O/P EST MOD 30 MIN: CPT | Mod: 25 | Performed by: PHYSICIAN ASSISTANT

## 2021-03-08 PROCEDURE — 90471 IMMUNIZATION ADMIN: CPT | Performed by: PHYSICIAN ASSISTANT

## 2021-03-08 PROCEDURE — 87389 HIV-1 AG W/HIV-1&-2 AB AG IA: CPT | Performed by: PHYSICIAN ASSISTANT

## 2021-03-08 PROCEDURE — 90715 TDAP VACCINE 7 YRS/> IM: CPT | Performed by: PHYSICIAN ASSISTANT

## 2021-03-08 PROCEDURE — 84443 ASSAY THYROID STIM HORMONE: CPT | Performed by: PHYSICIAN ASSISTANT

## 2021-03-08 PROCEDURE — 36415 COLL VENOUS BLD VENIPUNCTURE: CPT | Performed by: PHYSICIAN ASSISTANT

## 2021-03-08 RX ORDER — CYCLOBENZAPRINE HCL 5 MG
5 TABLET ORAL 2 TIMES DAILY PRN
Qty: 60 TABLET | Refills: 0 | Status: SHIPPED | OUTPATIENT
Start: 2021-03-08 | End: 2021-05-04

## 2021-03-08 RX ORDER — VENLAFAXINE HYDROCHLORIDE 37.5 MG/1
37.5 CAPSULE, EXTENDED RELEASE ORAL DAILY
Qty: 90 CAPSULE | Refills: 1 | Status: SHIPPED | OUTPATIENT
Start: 2021-03-08 | End: 2021-08-02

## 2021-03-08 RX ORDER — NAPROXEN 500 MG/1
500 TABLET ORAL 2 TIMES DAILY WITH MEALS
Qty: 30 TABLET | Refills: 0 | Status: SHIPPED | OUTPATIENT
Start: 2021-03-08 | End: 2021-05-04

## 2021-03-08 RX ORDER — CYCLOBENZAPRINE HCL 10 MG
10 TABLET ORAL
Qty: 30 TABLET | Refills: 0 | Status: SHIPPED | OUTPATIENT
Start: 2021-03-08 | End: 2021-05-04

## 2021-03-08 RX ORDER — VENLAFAXINE HYDROCHLORIDE 75 MG/1
75 CAPSULE, EXTENDED RELEASE ORAL DAILY
Qty: 90 CAPSULE | Refills: 1 | Status: SHIPPED | OUTPATIENT
Start: 2021-03-08 | End: 2021-08-02

## 2021-03-08 ASSESSMENT — PATIENT HEALTH QUESTIONNAIRE - PHQ9
5. POOR APPETITE OR OVEREATING: SEVERAL DAYS
SUM OF ALL RESPONSES TO PHQ QUESTIONS 1-9: 2

## 2021-03-08 ASSESSMENT — ANXIETY QUESTIONNAIRES
IF YOU CHECKED OFF ANY PROBLEMS ON THIS QUESTIONNAIRE, HOW DIFFICULT HAVE THESE PROBLEMS MADE IT FOR YOU TO DO YOUR WORK, TAKE CARE OF THINGS AT HOME, OR GET ALONG WITH OTHER PEOPLE: NOT DIFFICULT AT ALL
2. NOT BEING ABLE TO STOP OR CONTROL WORRYING: NOT AT ALL
1. FEELING NERVOUS, ANXIOUS, OR ON EDGE: NOT AT ALL
6. BECOMING EASILY ANNOYED OR IRRITABLE: NOT AT ALL
3. WORRYING TOO MUCH ABOUT DIFFERENT THINGS: NOT AT ALL
5. BEING SO RESTLESS THAT IT IS HARD TO SIT STILL: NOT AT ALL
7. FEELING AFRAID AS IF SOMETHING AWFUL MIGHT HAPPEN: NOT AT ALL
GAD7 TOTAL SCORE: 1

## 2021-03-08 NOTE — TELEPHONE ENCOUNTER
Kelsi Bedolla PA-C   3/8/2021  3:48 PM CST      Please call and let her know her TSH is quite elevated and T4 is low indicating hypothyroidism is not well controlled. Has she been taking her levothyroxine?     Thanks!  Kelsi Bedolla PA-C     [employer] message sent to patient.    Elma Vazquez RN

## 2021-03-08 NOTE — PROGRESS NOTES
Assessment & Plan     Acute right-sided low back pain with right-sided sciatica  Piriformis syndrome, right  Pain in right buttock  Chronic, worsening despite physical therapy. No red flag symptoms. Follow-up with ortho for further evaluation. Discussed no other NSAIDs while taking naproxen. No alcohol or driving while taking flexeril.  - cyclobenzaprine (FLEXERIL) 10 MG tablet; Take 1 tablet (10 mg) by mouth nightly as needed for muscle spasms  - cyclobenzaprine (FLEXERIL) 5 MG tablet; Take 1 tablet (5 mg) by mouth 2 times daily as needed for muscle spasms  - Orthopedic [facility] Spine  Referral; Future  - naproxen (NAPROSYN) 500 MG tablet; Take 1 tablet (500 mg) by mouth 2 times daily (with meals)    Menopausal flushing  Chronic, stable. Refills given.  - venlafaxine (EFFEXOR-XR) 37.5 MG 24 hr capsule; Take 1 capsule (37.5 mg) by mouth daily  - venlafaxine (EFFEXOR-XR) 75 MG 24 hr capsule; Take 1 capsule (75 mg) by mouth daily    Screening for HIV (human immunodeficiency virus)  Discussed, patient agrees  - HIV Antigen Antibody Combo    Need for hepatitis C screening test  Discussed, patient agrees  - Hepatitis C Screen Reflex to HCV RNA Quant and Genotype    Hypothyroidism due to Hashimoto's thyroiditis  Chronic, clinically euthyroid. Will check TSH.  - TSH WITH FREE T4 REFLEX    Encounter for screening mammogram for breast cancer  - MA SCREENING DIGITAL BILAT - Future  (s+30); Future    Need for Tdap vaccination  - TDAP VACCINE (Adacel, Boostrix)  [[id number]]    Return in about 3 months (around 6/8/2021) for Preventive Physical Exam.    Kelsi Bedolla PA-C  [facility] DEBBIE Rahman is a 55 year old who presents for the following health issues    HPI       Chronic/Recurring Back Pain Follow Up      Where is your back pain located? (Select all that apply) low back both, gluteus both, hip both and waist both    How would you describe your back pain?  burning and dull  ache    Where does your back pain spread? the right and left buttock and the right and left  thigh    Since your last clinic visit for back pain, how has your pain changed? gradually worsening    Does your back pain interfere with your job? No    Since your last visit, have you tried any new treatment? No      How many servings of fruits and vegetables do you eat daily?  2-3    On average, how many sweetened beverages do you drink each day (Examples: soda, juice, sweet tea, etc.  Do NOT count diet or artificially sweetened beverages)?   1    How many days per week do you exercise enough to make your heart beat faster? 3 or less    How many minutes a day do you exercise enough to make your heart beat faster? 20 - 29    How many days per week do you miss taking your medication? 0    Chronic sciatica, has done physical therapy in the past, still doing PT exercises at home. Pain is gradually worsening. Feels like back cracks when she bends down and gets back up, sometimes can even hear back cracking when walking. Sometimes pain goes into both sides of buttock and back of upper thighs. No leg numbness/tingling/weakness, no saddle anesthesia, no loss of bowel or bladder control. Uses flexeril which does help some.    Effexor - stable on this. No side effects or concerns.    TSH - taking levothyroxine. Feels stable. No constipation, dry skin, fatigue, weight gain.    Review of Systems   Constitutional, HEENT, cardiovascular, pulmonary, gi and gu systems are negative, except as otherwise noted.      Objective    /84 (BP Location: Right arm, Patient Position: Sitting, Cuff Size: Adult Large)   Pulse 79   Temp 98.1  F (36.7  C) (Oral)   Resp 16   Wt 94.8 kg (209 lb)   LMP 08/12/2012   SpO2 97%   BMI 35.32 kg/m    Body mass index is 35.32 kg/m .  Physical Exam   GENERAL: healthy, alert and no distress  NECK: no adenopathy, no asymmetry, masses, or scars and thyroid normal to palpation  RESP: lungs clear to  auscultation - no rales, rhonchi or wheezes  CV: regular rate and rhythm, normal S1 S2, no S3 or S4, no murmur, click or rub, no peripheral edema and peripheral pulses strong  MS: no gross musculoskeletal defects noted, no edema  NEURO: Normal strength and tone, mentation intact and speech normal  Comprehensive back pain exam:  Tenderness of right SI joint, Range of motion not limited by pain, Lower extremity strength functional and equal on both sides, Lower extremity reflexes within normal limits bilaterally, Lower extremity sensation normal and equal on both sides and Straight leg raise negative bilaterally, negative CYNDY  PSYCH: mentation appears normal, affect normal/bright

## 2021-03-09 RX ORDER — LEVOTHYROXINE SODIUM 175 UG/1
175 TABLET ORAL DAILY
Qty: 90 TABLET | Refills: 0 | Status: SHIPPED | OUTPATIENT
Start: 2021-03-09 | End: 2021-08-02

## 2021-03-09 ASSESSMENT — ANXIETY QUESTIONNAIRES: GAD7 TOTAL SCORE: 1

## 2021-03-09 NOTE — TELEPHONE ENCOUNTER
Patient calling back regarding her thyroid tests.  She says she has been taking her Levothyroxine as ordered daily.  She feels like she may need a dose increase with a repeat lab test when she comes in in April.    Routed to Dr. Tasia Hogue RN  [facility]

## 2021-03-09 NOTE — TELEPHONE ENCOUNTER
MARÍA and sent a MC message which is the form of communication the patient was using.     Maria Fernanda CLIFFORD RN

## 2021-03-09 NOTE — TELEPHONE ENCOUNTER
Patient calling back regarding her thyroid tests.  She says she has been taking her Levothyroxine as ordered daily.  She feels like she may need a dose increase with a repeat lab test when she comes in in April.    Routed to Dr. Tasia Hogue RN  [facility]        Reason for Disposition    [1] Caller requesting NON-URGENT health information AND [2] PCP's office is the best resource    Protocols used: INFORMATION ONLY CALL-A-

## 2021-03-09 NOTE — TELEPHONE ENCOUNTER
Please call    We should increase your levothyroxine dose and you should have thyroid labs rechecked in about 6 weeks, which is when you have your physical scheduled with Dr. Dozier. I sent in a new prescription to the [facility] pharmacy in [place].    Thanks!  Kelsi Bedolla PA-C

## 2021-03-09 NOTE — TELEPHONE ENCOUNTER
Patient see by Kelsi Bedolla, routing to provider for recommendations on dose increase.    Virginia ISRAEL RN, BSN

## 2021-03-10 NOTE — TELEPHONE ENCOUNTER
Message read by patient.    Last read by Arabella Hart at 10:20 AM on 3/9/2021.    Elma Vazquez RN

## 2021-03-20 ENCOUNTER — HEALTH MAINTENANCE LETTER (OUTPATIENT)
Age: 56
End: 2021-03-20

## 2021-03-23 ENCOUNTER — OFFICE VISIT (OUTPATIENT)
Dept: PALLIATIVE MEDICINE | Facility: CLINIC | Age: 56
End: 2021-03-23
Payer: COMMERCIAL

## 2021-03-23 VITALS
DIASTOLIC BLOOD PRESSURE: 84 MMHG | SYSTOLIC BLOOD PRESSURE: 134 MMHG | HEART RATE: 91 BPM | OXYGEN SATURATION: 98 % | BODY MASS INDEX: 35.32 KG/M2 | WEIGHT: 209 LBS

## 2021-03-23 DIAGNOSIS — G89.29 CHRONIC BILATERAL LOW BACK PAIN WITHOUT SCIATICA: Primary | ICD-10-CM

## 2021-03-23 DIAGNOSIS — M54.50 CHRONIC BILATERAL LOW BACK PAIN WITHOUT SCIATICA: Primary | ICD-10-CM

## 2021-03-23 PROCEDURE — 99203 OFFICE O/P NEW LOW 30 MIN: CPT | Performed by: PHYSICAL MEDICINE & REHABILITATION

## 2021-03-23 ASSESSMENT — PAIN SCALES - GENERAL: PAINLEVEL: MODERATE PAIN (4)

## 2021-03-23 NOTE — PATIENT INSTRUCTIONS
1. Order placed for Lumbar MRI since you do not have any baseline imaging.   2. Based on the results of the imaging we can discuss moving forward with injections. I will call you with results of the imaging.   3. You can try using topicals over the lateral hips to see if that provides some benefit.     Caridad Montez MD  [facility]   ----------------------------------------------------------------  Clinic Number:  [phone]     Call with any questions about your care and for scheduling assistance.     Calls are returned Monday through Friday between 8 AM and 4:30 PM. We usually get back to you within 2 business days depending on the issue/request.       We believe regular attendance is key to your success in our program!      Any time you are unable to keep your appointment we ask that you call us at least 24 hours in advance to cancel.This will allow us to offer the appointment time to another patient.     Multiple missed appointments may lead to dismissal from the clinic.

## 2021-03-23 NOTE — PROGRESS NOTES
[facility]ation    Date of visit: 3/23/2021    Assessment:  Arabella Hart is a 55 year old female with a past medical history significant for NAGI, IBS who presents with complaints of:    1. Chronic back pain: Chronic axial low back pain for last 2 years. No improvement with PT. Has associated stiffness in the mornings. Pain is aggravated with activity. No baseline lumbar imaging available. No red flag symptoms. Differential includes lumbar spondylosis, facet arthropathy, discogenic pain.     Plan:  The following recommendations were given to the patient. Diagnosis, treatment options, risks, benefits, and alternatives were discussed, and all questions were answered. The patient expressed understanding of the plan for management.     1. Therapies:  No improvement with PT in the past. Continue doing exercises at home.   2. Diagnostic Studies: Order placed for lumbar MRI. Will call with results.   3. Medication Management:   No changes made today. Discussed she could try OTC topicals over lateral hips to see if she has benefit.   4. Procedures recommended: will be determined based on results of imaging.  5. Follow up: will call with results of MRI.     Reason for consultation:    Primary Care Provider is Ely Dozier.    Arabella Hart is a 55 year old female with a history of NAGI, IBS who I was asked to see in consultation by Kelsi Bedolla  for evaluation of back pain.     Consultation and Evaluation for: Back pain    Review of Electronic Chart: Today I have also reviewed available medical information in the patient's medical record at [place] ([facility] including relevant provider notes, laboratory work, and imaging.     Chief Complaint:    Chief Complaint   Patient presents with     Pain     History:  Arabella Hart is a 55 year old female who presents for initial evaluation of chief pain complaint of back pain. Had onset of back pain about two years ago. Did PT at  the time. Still does the stretches. No significant improvement. No inciting event. 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 Switched jobs 3 years ago.     Pain is located in low back refers into right buttocks and into lateral hips. Into buttocks but not any further. Pain is constant. Tylenol takes edge off as well as aleve. It is aggravated by activity in general. She can't sleep on either side becaue lateral hips hurt. When she gets up in the morning feels stiff. Describes pain as aching but when aggravated can be sharp. Burning in buttocks region. Severity/Intensity: 6/10 at worst, 1/10 at best.    Red Flags: The patient denies bowel or bladder incontinence, parasthesias, weakness, saddle anesthesia, unintentional weight loss, or fever/chills/sweats.     Medications:       Current pain medications:  Cyclobenzaprine 10 mg q hs prn and 5 mg BID prn - Just using at night. Not helpful for pain but helps relax at night.   Naproxen 500 mg BID - [facility]  Venlafaxine 37.5 +75 mg daily  Gabapentin 600 mg TID - [facility]  Tylenol 1,000 mg TID - [facility]  Essential oils - [facility]             Previous pain medications:  Aspercrembe, biofreeze, bengay,   CBD topical - takes edge off    Past Pain Treatments:  PT: Yes - twice since 2016. about a year ago - [facility]. She does stretches at home. Used to walk more in the past.   TENs Unit: Yes - [facility]  Injections: No  Self-care:   Ice - [facility], heat - [facility]  Surgeries related to pain: None  Alternative Therapies:   Chiropractic: Yes - [facility]    Acupuncture: No  Other: None    Diagnostic tests:  XR of Lumbar spine on 8/18/2016:  IMPRESSION:  Thoracic scoliosis convex to the left. Five lumbar-type  vertebral bodies. No fracture, destructive lesion or significant  degenerative change. Surgical clips in the right upper quadrant.    No Lumbar MRI.     EMG/Testing:  NA    Labs:   No recent labs    Past Medical History:  Past Medical History:    Diagnosis Date     Abnormal Papanicolaou smear of cervix and cervical HPV     colpo done     ASCUS on Pap smear 12/20/06    + HPV     History of colposcopy with cervical biopsy 1/24/07    Atypical squamous metaplasia       Past Surgical History:  Past Surgical History:   Procedure Laterality Date     C LIGATE FALLOPIAN TUBE,POSTPARTUM  1998    Tubal Ligation     COLONOSCOPY N/A 12/11/2018    Procedure: COLONOSCOPY;  Surgeon: Vishnu Mandujano MD;  Location: [facility]     LAPAROSCOPIC CHOLECYSTECTOMY N/A 6/4/2015    Procedure: LAPAROSCOPIC CHOLECYSTECTOMY;  Surgeon: Maria Fernanda Regalado MD;  Location: [facility] OR     LAPAROSCOPIC HYSTERECTOMY TOTAL, BILATERAL SALPINGO-OOPHORECTOMY, COMBINED  8/14/2012    Procedure: COMBINED LAPAROSCOPIC HYSTERECTOMY TOTAL, SALPINGO-OOPHORECTOMY;  LAPAROSCOPIC HYSTERECTOMY TOTAL, Bilateral SALPINGO-OOPHORECTOMY, pelvic exam under anesthesia;  Surgeon: Jolene Baez MD;  Location: [facility] OR     ZZC NONSPECIFIC PROCEDURE  1983    Right Ankle Surgery     Medications:  Current Outpatient Medications   Medication Sig Dispense Refill     ACETAMINOPHEN PO Take 325-750 mg by mouth every 6 hours as needed.         cyclobenzaprine (FLEXERIL) 10 MG tablet Take 1 tablet (10 mg) by mouth nightly as needed for muscle spasms 30 tablet 0     cyclobenzaprine (FLEXERIL) 5 MG tablet Take 1 tablet (5 mg) by mouth 2 times daily as needed for muscle spasms 60 tablet 0     Fexofenadine HCl (ALLEGRA ALLERGY PO) Take  by mouth.         gabapentin (NEURONTIN) 600 MG tablet Take 1 tablet (600 mg) by mouth 3 times daily 270 tablet 3     levothyroxine (SYNTHROID/LEVOTHROID) 150 MCG tablet Take 1 tablet (150 mcg) by mouth daily 90 tablet 0     levothyroxine (SYNTHROID/LEVOTHROID) 175 MCG tablet Take 1 tablet (175 mcg) by mouth daily 90 tablet 0     naproxen (NAPROSYN) 500 MG tablet Take 1 tablet (500 mg) by mouth 2 times daily (with meals) 30 tablet 0     venlafaxine (EFFEXOR-XR) 37.5 MG 24 hr capsule Take 1  capsule (37.5 mg) by mouth daily 90 capsule 1     venlafaxine (EFFEXOR-XR) 75 MG 24 hr capsule Take 1 capsule (75 mg) by mouth daily 90 capsule 1       Allergies:     Allergies   Allergen Reactions     Ibuprofen Itching     Monosodium Glutamate      MSG=sinus headache     Oxycodone        Family history:  Family History   Problem Relation Age of Onset     Breast Cancer Paternal Grandmother      Thyroid Disease Mother         Hypothyroid     Cancer Mother         Ovarian - age 61     Coronary Artery Disease Paternal Grandfather      Hypertension Father         Heart complication after MI.       Cancer Father         Throat (Epiglottis) smoker     Heart Disease Father          of heart attack     Coronary Artery Disease Father      Breast Cancer Paternal Aunt         and P great aunt     Breast Cancer Paternal Aunt      Coronary Artery Disease Brother      Colon Cancer No family hx of      Social History:  Home situation: Lives in [place], [place] with  and son.   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   Tobacco use: smokes 3/4 ppd  Drug use: none  Alcohol use: 0-1 drinks per week     Review of Systems:    POSTIVE IN BOLD  GENERAL: fever/chills, fatigue, general unwell feeling, weight gain/loss.  HEAD/EYES: headache, dizziness, or vision changes.    EARS/NOSE/THROAT:  Nosebleeds, hearing loss, sinus infection, earache, tinnitus.  IMMUNE:  Allergies, cancer, immune deficiency, or infections.  SKIN:  Urticaria, rash, hives  HEME/Lymphatic:   anemia, easy bruising, easy bleeding.  RESPIRATORY:  cough, wheezing, or shortness of breath  CARDIOVASCULAR/Circulation:  Extremity edema, syncope, hypertension, tachycardia, or angina.  GASTROINTESTINAL:  abdominal pain, nausea/emesis, diarrhea, constipation,  hematochezia, or melena.  ENDOCRINE:  Diabetes, steroid use,  thyroid disease or osteoporosis.  MUSCULOSKELETAL: neck pain, back pain, arthralgia, arthritis, or  gout.  GENITOURINARY:  frequency, urgency, dysuria, difficulty voiding, hematuria or incontinence.  NEUROLOGIC:  weakness, numbness, paresthesias, seizure, tremor, stroke or memory loss.  PSYCHIATRIC:  depression, anxiety, stress, suicidal thoughts or mood swings.     Physical Exam:  Vitals:    03/23/21 1559   BP: 134/84   Pulse: 91   SpO2: 98%   Weight: 94.8 kg (209 lb)     Exam:  Constitutional: Well developed, well nourished, appears stated age.  HEENT: Head atraumatic, normocephalic. Eyes without conjunctival injection or jaundice. Neck supple. No obvious neck masses.  Respiratory: Breathing unlabored on room air.   Skin: No rash, lesions of exposed skin.   Psychiatric/mental status: Alert, without lethargy or stupor. Speech fluent. Appropriate affect. Mood normal. Able to follow commands without difficulty.     Musculoskeletal exam:  Gait/Station/Posture:   Normal stance, arm swing, and stride  Normal bulk and tone    Lumbar spine:  Range of motion within normal limits. Pain with extension  Myofascial tenderness:  Lower lumbar paraspinals    Neurologic exam:  CN:  Cranial nerves 2-12 are grossly intact  Motor Strength:  5/5 symmetric LE strength    Reflexes:     Patella L4:  R:  2/4 L: 2/4   Achilles S1:  R:  2/4 L: 2/4    Sensory:  (lower extremities):   Light touch: normal    Allodynia: absent    Hyperalgesia: absent     Caridad Montez MD  [facility]       BILLING TIME DOCUMENTATION:   The total TIME spent on this patient on the date of the encounter/appointment was 30 minutes.      TOTAL TIME includes:   Time spent preparing to see the patient (reviewing records and tests)   Time spent face to face (or over the phone) with the patient   Time spent ordering tests, medications, procedures and referrals   Time spent documenting clinical information in Epic

## 2021-05-01 ENCOUNTER — HOSPITAL ENCOUNTER (OUTPATIENT)
Dept: MRI IMAGING | Facility: CLINIC | Age: 56
Discharge: HOME | End: 2021-05-01
Attending: PHYSICAL MEDICINE & REHABILITATION | Admitting: PHYSICAL MEDICINE & REHABILITATION
Payer: COMMERCIAL

## 2021-05-01 ENCOUNTER — MYC REFILL (OUTPATIENT)
Dept: FAMILY MEDICINE | Facility: CLINIC | Age: 56
End: 2021-05-01

## 2021-05-01 DIAGNOSIS — M79.18 PAIN IN RIGHT BUTTOCK: ICD-10-CM

## 2021-05-01 DIAGNOSIS — G89.29 CHRONIC BILATERAL LOW BACK PAIN WITHOUT SCIATICA: ICD-10-CM

## 2021-05-01 DIAGNOSIS — M54.41 ACUTE RIGHT-SIDED LOW BACK PAIN WITH RIGHT-SIDED SCIATICA: ICD-10-CM

## 2021-05-01 DIAGNOSIS — M54.50 CHRONIC BILATERAL LOW BACK PAIN WITHOUT SCIATICA: ICD-10-CM

## 2021-05-01 DIAGNOSIS — G57.01 PIRIFORMIS SYNDROME, RIGHT: ICD-10-CM

## 2021-05-01 PROCEDURE — 72148 MRI LUMBAR SPINE W/O DYE: CPT

## 2021-05-01 RX ORDER — NAPROXEN 500 MG/1
500 TABLET ORAL 2 TIMES DAILY WITH MEALS
Qty: 30 TABLET | Refills: 0 | Status: CANCELLED | OUTPATIENT
Start: 2021-05-01

## 2021-05-01 RX ORDER — CYCLOBENZAPRINE HCL 10 MG
10 TABLET ORAL
Qty: 30 TABLET | Refills: 0 | Status: CANCELLED | OUTPATIENT
Start: 2021-05-01

## 2021-05-01 RX ORDER — CYCLOBENZAPRINE HCL 5 MG
5 TABLET ORAL 2 TIMES DAILY PRN
Qty: 60 TABLET | Refills: 0 | Status: CANCELLED | OUTPATIENT
Start: 2021-05-01

## 2021-05-03 ENCOUNTER — NURSE TRIAGE (OUTPATIENT)
Dept: NURSING | Facility: CLINIC | Age: 56
End: 2021-05-03

## 2021-05-03 DIAGNOSIS — G57.01 PIRIFORMIS SYNDROME, RIGHT: ICD-10-CM

## 2021-05-03 DIAGNOSIS — M54.41 ACUTE RIGHT-SIDED LOW BACK PAIN WITH RIGHT-SIDED SCIATICA: ICD-10-CM

## 2021-05-03 DIAGNOSIS — M79.18 PAIN IN RIGHT BUTTOCK: ICD-10-CM

## 2021-05-03 NOTE — TELEPHONE ENCOUNTER
cyclobenzaprine (FLEXERIL) 10 MG tablet  Last Written Prescription Date:  3/8/21  Last Fill Quantity: 30,   # refills: 0  Last Office Visit: 3/8/21  Future Office visit:       Routing refill request to provider for review/approval because:  Drug not on the [facility], [facility] or [facility] refill protocol or controlled substance    cyclobenzaprine (FLEXERIL) 5 MG tablet  Last Written Prescription Date:  3/8/21  Last Fill Quantity: 60,   # refills: 0  Last Office Visit: 3/8/21  Future Office visit:       Routing refill request to provider for review/approval because:  Drug not on the [facility], [facility] or [facility] refill protocol or controlled substance    naproxen (NAPROSYN) 500 MG tablet  Last Written Prescription Date:  3/8/21  Last Fill Quantity: 30,   # refills: 0  Last Office Visit: 3/8/21  Future Office visit:       Routing refill request to provider for review/approval because:  Failed labs     Sandra Nayak RN on 5/3/2021 at 11:59 AM

## 2021-05-03 NOTE — TELEPHONE ENCOUNTER
Pt called in to return a call.  Pt states yes is taking cyclobenzaprine (FLEXERIL) 10 MG tablet and cyclobenzaprine (FLEXERIL) 5 MG tablet.  Pt states she still taking  naproxen (NAPROSYN) 500 MG tablet.  Pt states she is waiting the result of MRI.      Please advise.        Jerad Love [place] Nurse Advisor 5/3/2021 6:45 PM

## 2021-05-03 NOTE — TELEPHONE ENCOUNTER
Please call. Is she still taking the flexeril and the naproxen? Did ortho recommend anything different?    Thanks!  Kelsi Bedolla PA-C

## 2021-05-04 ENCOUNTER — TELEPHONE (OUTPATIENT)
Dept: PALLIATIVE MEDICINE | Facility: CLINIC | Age: 56
End: 2021-05-04

## 2021-05-04 ENCOUNTER — NURSE TRIAGE (OUTPATIENT)
Dept: NURSING | Facility: CLINIC | Age: 56
End: 2021-05-04

## 2021-05-04 DIAGNOSIS — M47.816 LUMBAR FACET ARTHROPATHY: Primary | ICD-10-CM

## 2021-05-04 DIAGNOSIS — M79.18 PAIN IN RIGHT BUTTOCK: ICD-10-CM

## 2021-05-04 DIAGNOSIS — M54.41 ACUTE RIGHT-SIDED LOW BACK PAIN WITH RIGHT-SIDED SCIATICA: Primary | ICD-10-CM

## 2021-05-04 DIAGNOSIS — G57.01 PIRIFORMIS SYNDROME, RIGHT: ICD-10-CM

## 2021-05-04 RX ORDER — CYCLOBENZAPRINE HCL 5 MG
TABLET ORAL
Qty: 120 TABLET | Refills: 0 | Status: SHIPPED | OUTPATIENT
Start: 2021-05-04 | End: 2021-07-14 | Stop reason: ALTCHOICE

## 2021-05-04 RX ORDER — CYCLOBENZAPRINE HCL 10 MG
10 TABLET ORAL
Qty: 30 TABLET | Refills: 0 | Status: SHIPPED | OUTPATIENT
Start: 2021-05-04 | End: 2021-05-04

## 2021-05-04 RX ORDER — CYCLOBENZAPRINE HCL 5 MG
5 TABLET ORAL 2 TIMES DAILY PRN
Qty: 60 TABLET | Refills: 0 | Status: SHIPPED | OUTPATIENT
Start: 2021-05-04 | End: 2021-05-04

## 2021-05-04 RX ORDER — NAPROXEN 500 MG/1
500 TABLET ORAL 2 TIMES DAILY WITH MEALS
Qty: 30 TABLET | Refills: 0 | Status: SHIPPED | OUTPATIENT
Start: 2021-05-04 | End: 2022-10-27

## 2021-05-04 NOTE — TELEPHONE ENCOUNTER
Called [facility] to discuss results of lumbar MRI. There was no answer. LVM to return call.     Caridad Montez MD  [facility]

## 2021-05-04 NOTE — TELEPHONE ENCOUNTER
Called and spoke with Delon at pharmacy. Confirmed receipt of new rx for cyclobenzaprine (FLEXERIL) 5 MG tablet. Pharmacy states everything looks good. No further action needed.     Torres TORREZ RN

## 2021-05-04 NOTE — TELEPHONE ENCOUNTER
"Caller is Landon Alfonso \"Delon.\"  Re'cd two orders for cyclobenzaprine.  States \"Insurance doesn't like two prescriptions for the same med.\"    \"Better option would be one Rx for the 5 mg tablet, with change of Sig to indicate 'one tablet two times during day' prn, and 'two tablets at bedtime prn'.\"  Then quantity dispensed can be changed to #120.    PharmD asks if a new transmittal could be sent; then other two Rx's discont'd.    Best phone # for PharmD-Delon (if needed) ->  [phone].    Thank you-    Rachel [facility] Nurse Advisor     Reason for Disposition    Pharmacy calling with prescription questions and triager unable to answer question    Protocols used: MEDICATION QUESTION CALL-A-OH      "

## 2021-05-05 NOTE — TELEPHONE ENCOUNTER
Called [facility] to review results of lumbar MRI. She has severe facet arthropathy at L5-S1 with marrow edema suggestive of active arthropathy. Discussed lumbar facet joint injections vs MBB to RFA. She would like to proceed with facet joint injections. Order placed for bilateral L5-S1 facet joint injections.     Caridad Montez MD  [facility]

## 2021-05-06 ENCOUNTER — TELEPHONE (OUTPATIENT)
Dept: PALLIATIVE MEDICINE | Facility: CLINIC | Age: 56
End: 2021-05-06

## 2021-05-06 NOTE — TELEPHONE ENCOUNTER
Screening Questions for Radiology Injections:    Injection to be done at which interventional clinic site? [facility]    If [facility] location, tell patient that this procedure requires a COVID-19 lab test be done within 4 days of the procedure. Would you still like to move forward with scheduling the procedure?  Not Applicable   If YES, let patient know that someone will call them to schedule the COVID-19 test and that they will only receive a call back if the result is positive. Route to nursing to enter order.     Instruct patient to arrive as directed prior to the scheduled appointment time:    [place] minutes before      [place]: 30 minutes before; if IV needed 1 hour before     Procedure ordered by Melida    Procedure ordered? Bilateral L5-S1 facet joint injections      Transforaminal Cervical PETER - no pain provider currently performing    As a reminder, receiving steroids can decrease your body's ability to fight infection.   Would you still like to move forward with scheduling the injection?  Yes    What insurance would patient like us to bill for this procedure? [employer]/ BCBS      Worker's comp or MVA (motor vehicle accident) -Any injection DO NOT SCHEDULE and route to Jennifer Flores.      [employer] insurance - For SI joint injections, DO NOT SCHEDULE and route Jennifer Flores.       ALL BCBS, [employer] and [employer]-Route to Jennifer for review DO NOT SCHEDULE      IF SCHEDULING IN [place] AND NEEDS A PA, IT IS OKAY TO SCHEDULE. [place] HANDLES THEIR OWN PA'S AFTER THE PATIENT IS SCHEDULED. PLEASE SCHEDULE AT LEAST 1 WEEK OUT SO A PA CAN BE OBTAINED.    Any chance of pregnancy? NO   If YES, do NOT schedule and route to RN pool    Is an  needed? No     Patient has a drive home? (mandatory) YES: INFORMED    Is patient taking any blood thinners (i.e. plavix, coumadin, jantoven, warfarin, heparin, pradaxa or dabigatran, etc)? No   If hold needed, do NOT schedule,  route to RN pool     Is patient taking any aspirin products (includes Excedrin and Fiorinal)? No     If more than 325mg/day, OK to schedule; Instruct pt to decrease to less than 325 mg for 7 days AND route to RN pool    For CERVICAL procedures, hold all aspirin products for 6 days.     Tell pt that if aspirin product is not held for 6 days, the procedure WILL BE cancelled.      Does the patient have a bleeding or clotting disorder? No     If YES, okay to schedule AND route to RN nurse pool    For any patients with platelet count <100, must be forwarded to provider    Any allergies to contrast dye, iodine, shellfish, or numbing and steroid medications? No    If YES, add allergy information to appointment notes AND route to the RN pool     If PETER and Contrast Dye Allergy? DO NOT SCHEDULE, route to RN pool    Allergies: Ibuprofen, Monosodium glutamate, and Oxycodone     Is patient diabetic?  No  If YES, instruct them to bring their glucometer.    Does patient have an active infection or treated for one within the past week? No     Is patient currently taking any antibiotics?  No     For patients on chronic, preventative, or prophylactic antibiotics, procedures may be scheduled.     For patients on antibiotics for active or recent infection:antibiotic course must have been completed for 4 days    Is patient currently taking any steroid medications? (i.e. Prednisone, Medrol)  No     For patients on steroid medications, course must have been completed for 4 days    Is patient actively being treated for cancer or immunocompromised? No  If YES, do NOT schedule and route to RN pool     Are you able to get on and off an exam table with minimal or no assistance? Yes  If NO, do NOT schedule and route to RN pool    Are you able to roll over and lay on your stomach with minimal or no assistance? Yes  If NO, do NOT schedule and route to RN pool     Has the patient had a flu shot or any other vaccinations within 7 days before or  after the procedure.  No     Have you recently had a COVID vaccine or have plans to get it in the near future? No    If yes, explain that for the vaccine to work best they need to:       wait 1 week before and 1 week after getting Vaccine #1    wait 1 week before and 2 weeks after getting Vaccine #2    If patient has concerns about the timing, send to RN pool     Does patient have an MRI/CT?  YES: 2021  Check Procedure Scheduling Grid to see if required.      Was the MRI done within the last 3 years?  Yes    If yes, where was the MRI done i.e.[facility], [facility], [facility], [facility], [facility] etc? [facility]      If no, do not schedule and route to RN pool    If MRI was not done at [place], [facility] or [facility], [facility] do NOT schedule and route to RN pool.      If pt has an imaging disc, the injection MAY be scheduled but pt has to bring disc to appt.     If they show up without the disc the injection cannot be done    Procedure Specific Instructions:      If celiac plexus block, informed patient NPO for 6 hours and that it is okay to take medications with sips of water, especially blood pressure medications  Not Applicable         If this is for a cervical procedure, informed patient that aspirin needs to be held for 6 days.   Not Applicable      If IV needed:    Do not schedule procedures requiring IV placement in the first appointment of the day or first appointment after lunch. Do NOT schedule at 0745, 0815 or 1245.     Instructed pt to arrive 30 minutes early for IV start if required. (Check Procedure Scheduling Grid)  Not Applicable    Reminders:      If you are started on any steroids or antibiotics between now and your appointment, you must contact us because the procedure may need to be cancelled.  Yes      For all procedures except radiofrequency ablations (RFAs) and spinal cord stimulator (SCS) trials, informed patient:    IV sedation is not provided for this procedure.  If you feel that an oral anti-anxiety  medication is needed, you can discuss this further with your referring provider or primary care provider.  The Pain Clinic provider will discuss specifics of what the procedure includes at your appointment.  Most procedures last 10-20 minutes.  We use numbing medications to help with any discomfort during the procedure.  Not Applicable      For patients 85 or older we recommend having an adult stay w/ them for the remainder of the day.       Does the patient have any questions?  NO  Jhoana Brandon  [facility]

## 2021-05-06 NOTE — TELEPHONE ENCOUNTER
No PA required, okay to schedule        Jennifer CLIFFORD    [place] [facility]

## 2021-05-07 NOTE — TELEPHONE ENCOUNTER
Jerad Love, RN      6:46 PM  Note     Pt called in to return a call.  Pt states yes is taking cyclobenzaprine (FLEXERIL) 10 MG tablet and cyclobenzaprine (FLEXERIL) 5 MG tablet.  Pt states she still taking  naproxen (NAPROSYN) 500 MG tablet.  Pt states she is waiting the result of MRI.        Please advise.           Jerad Love [place] Nurse Advisor 5/3/2021 6:45 PM

## 2021-05-10 ENCOUNTER — TELEPHONE (OUTPATIENT)
Dept: PALLIATIVE MEDICINE | Facility: CLINIC | Age: 56
End: 2021-05-10

## 2021-05-10 NOTE — TELEPHONE ENCOUNTER
Called patient to confirm procedure appointment tomorrow at 9:15am with Dr. Montez.     Patient asked if she would be able to work at 10pm tomorrow night, after the procedure. 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 She stated she is aware of the 6 hours of not driving.     Advised patient that a nurse must call her back. She stated understanding. She will be waiting for the call.     [facility]

## 2021-05-10 NOTE — TELEPHONE ENCOUNTER
Called pt. Advised that there are no real restrictions and that if needed we could supply her with note for excuse from work for her shift tomorrow.     Brie FORTE, RN Care Coordinator  [facility]  [facility]

## 2021-05-11 ENCOUNTER — RADIOLOGY INJECTION OFFICE VISIT (OUTPATIENT)
Dept: PALLIATIVE MEDICINE | Facility: CLINIC | Age: 56
End: 2021-05-11
Payer: COMMERCIAL

## 2021-05-11 VITALS — SYSTOLIC BLOOD PRESSURE: 138 MMHG | DIASTOLIC BLOOD PRESSURE: 85 MMHG | OXYGEN SATURATION: 95 % | HEART RATE: 77 BPM

## 2021-05-11 DIAGNOSIS — M47.816 LUMBAR FACET ARTHROPATHY: ICD-10-CM

## 2021-05-11 PROCEDURE — 64493 INJ PARAVERT F JNT L/S 1 LEV: CPT | Mod: 50 | Performed by: PHYSICAL MEDICINE & REHABILITATION

## 2021-05-11 NOTE — NURSING NOTE
Pre-procedure Intake    Have you been fasting? No    If yes, for how long?     Are you taking a prescribed blood thinner such as coumadin, Plavix, Xarelto?    No    If yes, when did you take your last dose?     Do you take aspirin?   NO    If cervical procedure, have you held aspirin for 6 days?   NA    Do you have any allergies to contrast dye, iodine, steroid and/or numbing medications?  NO    Are you currently taking antibiotics or have an active infection?  NO    Have you had a fever/elevated temperature within the past week? NO    Are you currently taking oral steroids? NO    Do you have a ? Yes       Are you pregnant or breastfeeding? NA    Have you received the COVID-19 vaccine? Yes     If yes, was it your 1st, 2nd or only dose needed? 2nd dose  February 2021    Notify provider and RNs if systolic BP >170, diastolic BP >100, P >100 or O2 sats < 90%

## 2021-05-11 NOTE — LETTER
[facility]NAG[facility] [address] [address]  [phone]      5/11/2021    To Whom It May Concern,     Arabella Tanner was seen in clinic today for a procedure. Please excuse her from work today, 5/11/21.       Sincerely,         Caridad Montez MD

## 2021-05-11 NOTE — NURSING NOTE
Discharge Information    IV Discontiued Time:  NA    Amount of Fluid Infused:  NA    Discharge Criteria = When patient returns to baseline or as per MD order    Consciousness:  Pt is fully awake    Circulation:  BP +/- 20% of pre-procedure level    Respiration:  Patient is able to breathe deeply    O2 Sat:  Patient is able to maintain O2 Sat >92% on room air    Activity:  Moves 4 extremities on command    Ambulation:  Patient is able to stand and walk or stand and pivot into wheelchair    Dressing:  Clean/dry or No Dressing    Notes:   Discharge instructions and AVS given to patient    Patient meets criteria for discharge?  YES    Admitted to PCU?  No    Responsible adult present to accompany patient home?  Yes    Signature/Title:    Leela Dunbar RN Care Coordinator  [facility] [place]

## 2021-05-11 NOTE — PROGRESS NOTES
[facility] - Procedure Note    Date of Visit: 5/11/2021    Pre procedure Diagnosis: Facet arthropathy   Post procedure Diagnosis: Same  Procedure performed: Bilateral L5-S1 facet joint injections  Anesthesia: none  Complications: none  Operators: Caridad Montez MD    Indications:   Arabella Hart is a 55 year old female who is seen by me in clinic presents today for lumbar facet joint injections.  They have a history of chronic low back pain.  Exam shows pain with lumbar extension and rotation and they have tried conservative treatment including PT, chiropractic, medications.    Options/alternatives, benefits and risks were discussed with the patient including bleeding, infection, flared pain, tissue trauma, exposure to radiation, reaction to medications including seizure, spinal cord injury, paralysis, weakness, numbness and headache.    Questions were answered to her satisfaction and she agrees to proceed. Voluntary informed consent was obtained and signed.     Vitals were reviewed: Yes  Allergies were reviewed:  Yes   Medications were reviewed:  Yes     Imaging:   MRI of Lumbar Spine completed on 5/1/2021 which shows:  FINDINGS: For numbering purposes, L1 is considered to have small ribs,  normal variant. Alignment is significant for subtle grade 1  retrolisthesis of L4 on L5 and mild grade 1 anterolisthesis of L5 on  S1. Bone marrow demonstrates mild lower lumbar spine degenerative  endplate change. In addition, edema is present surrounding the  bilateral L5-S1 facet joints. Conus medullaris is unremarkable  terminating at the level of the L1-2 disc.     Multiple nonspecific T2 hyperintense renal lesions which are  incompletely characterized, statistically likely benign cysts. Tiny  area of T2 hyperintense signal within the right liver which is  incompletely characterized, statistically likely benign.     Segmental Analysis:      T12-L1:  Disc height maintained. No herniation.  Normal facet joints.  No foraminal or spinal canal stenosis.      L1-L2:  Disc height maintained. No herniation. Normal facet joints. No  foraminal or spinal canal stenosis.       L2-L3:  Minimal disc height loss. No herniation. Normal facet joints.  No foraminal or spinal canal stenosis.       L3-L4:  Disc height maintained. No herniation. Normal facet joints. No  foraminal or spinal canal stenosis.       L4-L5:  Disc height maintained. No herniation. Mild bilateral facet  arthropathy. No foraminal or spinal canal stenosis.       L5-S1:  Mild disc height loss. Disc bulge with uncovering. Severe  bilateral facet arthropathy with marrow edema suggestive of active  arthropathy. Multiple synovial cysts are present projecting off the  facet joints, including a 6 mm synovial cyst projecting anteriorly off  the left facet joint which contributes to moderate-to-severe left  foraminal stenosis. Mild right foraminal stenosis. Mild spinal canal  stenosis.                                                                      IMPRESSION:  Severe bilateral facet arthropathy at L5-S1 with synovial  cysts, grade 1 anterolisthesis, and bilateral foraminal stenosis, left  worse than right, related to anteriorly projecting synovial cyst.    Procedure:  After getting informed consent, patient was brought into the procedure suite and was placed in a prone position on the procedure table.   A Pause for the Cause was performed.  Patient was prepped and draped in sterile fashion.     Under AP fluoroscopic guidance the L5-S1 facet joints on the right and left side were identified, and the C-arm was rotated obliquely to the affected side to open the joint space. A total of 4 ml of 1% lidocaine was injected at the needle entry point and needle tract. Then a 22 gauge 3.5 inch quincke type spinal needle was inserted and advanced under fluoroscopic guidance targeting the superior articular pillar of each joint. Once the needle made a contact  with SAP, it was rotated and was then advanced into the joint.    AP fluoroscopic views were obtained to confirm the needle placement. Then, Omnipaque 300 contrast dye was injected after negative aspiration for heme and CSF in each joint, confirming appropriate placement.  A total of 1 mL of Omnipaque was used and 9 mL was wasted.    The injection was then accomplished using a solution containing 1 ml of 0.25% bupivacaine mixed with 40 mg of kenolog, divided between the 2 joints. The needles were removed..     Hemostasis was achieved, the area was cleaned, and bandaids were placed when appropriate.  The patient tolerated the procedure well, and was taken to the recovery room.    Images were saved to PACS.    Pre-procedure pain score: 6/10  Post-procedure pain score: 6/10    Assessment/Plan: Arabella Hart is a 55 year old female s/p bilateral L5-S1 facet joint injections for chronic back pain.     1. Following today's procedure, the patient was advised to contact the [facility] for any of the following:   Fever, chills, or night sweats   New onset of pain, numbness, or weakness   Any questions/concerns regarding the procedure  If unable to contact the Pain Center, the patient was instructed to go to a local Emergency Room for any complications.   2. Follow-up with me in 2-3 weeks for post-procedure evaluation.    Caridad Montez MD  [facility] [place]

## 2021-05-11 NOTE — PATIENT INSTRUCTIONS
[facility] Procedure Discharge Instructions    Today you saw:    Dr. Caridad Montez      Your procedure:    Sacro-iliac joint injection       Medications used:  Lidocaine (anesthetic)  Bupivacaine (anesthetic)        Kenalog (steroid)  Omnipaque (contrast)                 Be cautious when walking as numbness and/or weakness in the legs may occur up to 6-8 hours after the procedure due to effect of the local anesthetic    Do not drive for 6 hours. The effect of the local anesthetic could slow your reflexes.     Avoid strenuous activity for the first 24 hours. You may resume your regular activities after that.     You may shower, however avoid swimming, tub baths or hot tubs for 24 hours following your procedure    You may have a mild to moderate increase in pain for several days following the injection.      You may use ice packs for 10-15 minutes, 3 to 4 times a day at the injection site for comfort    Do not use heat to painful areas for 6 to 8 hours. This will give the local anesthetic time to wear off and prevent you from accidentally burning your skin.    Unless you have been directed to avoid the use of anti-inflammatory medications (NSAIDS-ibuprofen, Aleve, Motrin), you may use these medications or Tylenol for pain control if needed.     With diabetes, check your blood sugar more frequently than usual as your blood sugar may be higher than normal for 10-14 days following a steroid injection. Contact your doctor who manages your diabetes if your blood sugar is higher than usual    Possible side effects of steroids that you may experience include flushing, elevated blood pressure, increased appetite, mild headaches and restlessness.  All of these symptoms will get better with time.    It may take up to 14 days for the steroid medication to start working although you may feel the effect as early as a few days after the procedure.     Follow up with your referring provider in 2-3 weeks      If you  experience any of the following, call the pain center line during work hours at [phone] or on-call physician after hours at [phone]:  -Fever over 100 degree F  -Swelling, bleeding, redness, drainage, warmth at the injection site  -Progressive weakness or numbness in your legs   -Loss of bowel or bladder function  -Unusual headache that is not relieved by Tylenol or your regular headache medication  -Unusual new onset of pain that is not improving

## 2021-05-13 NOTE — TELEPHONE ENCOUNTER
Medications require approval to close encounter.     Routing to pain nurse.     [facility]

## 2021-07-04 ENCOUNTER — MYC MEDICAL ADVICE (OUTPATIENT)
Dept: PALLIATIVE MEDICINE | Facility: CLINIC | Age: 56
End: 2021-07-04

## 2021-07-05 NOTE — TELEPHONE ENCOUNTER
Will leave encounter open for patient response/chart review by nursing.     Routing to provider for any recommendations RE; DO for manipulations    ----------------Mychart Below from pt----------------  I have been attempting to schedule an appointment with you but have been unsuccessful getting through.  My injections helped a little bit.  I would expect to have more problems with my left side based on the results of the MRI.  I'm really having problems with my right side.  Nothing seems to really help medication wise so I have just been taking the flexeril as needed before bed as needed.  Ice has been helping too.  Is this something that the ablasion would help?  I have also been thinking about attempting chiropractic care once again or a DO.  Do you know of any DO's that practice manipulation?    --------------Mychart below response to pt----------------  Sabrina Rahman,     Sorry to hear that you are having pain. You last saw Dr Montez in March so I would recommend you make an appointment to follow up to discuss things. Our scheduling number is [phone].    Dr Montez is out of the office for the next few days, when she returns I can inquire with her to see if she has any recommendations with a DO that does manipulations.     Brie FORTE, RN Care Coordinator  [facility]  Pain Management

## 2021-07-08 NOTE — TELEPHONE ENCOUNTER
Called Josiah. Set up in clinic visit with Dr Montez for 07/14/2021 at 0800 to discuss options.    Leela Dunbar RN  Care Coordinator  [facility]

## 2021-07-08 NOTE — TELEPHONE ENCOUNTER
Recommend clinic follow up to discuss further. I do not know any DO's that practice manipulation.     Caridad Montez MD  [facility]

## 2021-07-14 ENCOUNTER — OFFICE VISIT (OUTPATIENT)
Dept: PALLIATIVE MEDICINE | Facility: CLINIC | Age: 56
End: 2021-07-14
Payer: COMMERCIAL

## 2021-07-14 VITALS — HEART RATE: 85 BPM | OXYGEN SATURATION: 97 % | SYSTOLIC BLOOD PRESSURE: 129 MMHG | DIASTOLIC BLOOD PRESSURE: 82 MMHG

## 2021-07-14 DIAGNOSIS — M79.18 MYOFASCIAL PAIN: ICD-10-CM

## 2021-07-14 DIAGNOSIS — M47.816 LUMBAR FACET ARTHROPATHY: Primary | ICD-10-CM

## 2021-07-14 PROCEDURE — 99213 OFFICE O/P EST LOW 20 MIN: CPT | Performed by: PHYSICAL MEDICINE & REHABILITATION

## 2021-07-14 RX ORDER — METHOCARBAMOL 500 MG/1
500 TABLET, FILM COATED ORAL 4 TIMES DAILY PRN
Qty: 60 TABLET | Refills: 1 | Status: SHIPPED | OUTPATIENT
Start: 2021-07-14 | End: 2021-08-02

## 2021-07-14 RX ORDER — MELOXICAM 7.5 MG/1
7.5 TABLET ORAL DAILY
Qty: 30 TABLET | Refills: 0 | Status: SHIPPED | OUTPATIENT
Start: 2021-07-14 | End: 2021-08-02

## 2021-07-14 ASSESSMENT — PAIN SCALES - GENERAL: PAINLEVEL: MODERATE PAIN (5)

## 2021-07-14 NOTE — PROGRESS NOTES
[facility] Follow Up     Date of visit: 7/14/2021    Last seen by me on 3/23/2021.      Assessment:  Arabella Hart is a 55 year old female with a past medical history significant for NAGI, IBS who presents with complaints of:     1. Chronic back pain: Chronic axial low back pain for last 2 years. No improvement with PT. Has associated stiffness in the mornings. Pain is aggravated with activity Etiology secondary to lumbar spondylosis, facet arthropathy.    Plan:  1. Therapies:  No improvement with PT in the past. Continue doing exercises at home.   2. Diagnostic Studies:None  3. Medication Management:    1.  Stop cyclobenzaprine. Start methocarbamol QID prn.    2. Stop Naproxen. Start meloxicam 7.5 mg daily.   4. Procedures recommended: Order placed for right lumbar MBB to RFA  5. Follow up: for procedure    Chief complaint:   Chief Complaint   Patient presents with     Pain     HPI from initial H&P:  Arabella Hart is a 55 year old female who presents for initial evaluation of chief pain complaint of back pain. Had onset of back pain about two years ago. Did PT at the time. Still does the stretches. No significant improvement. No inciting event. 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 Switched jobs 3 years ago.      Pain is located in low back refers into right buttocks and into lateral hips. Into buttocks but not any further. Pain is constant. Tylenol takes edge off as well as aleve. It is aggravated by activity in general. She can't sleep on either side becaue lateral hips hurt. When she gets up in the morning feels stiff. Describes pain as aching but when aggravated can be sharp. Burning in buttocks region. Severity/Intensity: 6/10 at worst, 1/10 at best.    Since her last visit, Arabella Hart reports:  - Bilateral L5-S1 facet joint injections completed on 5/11/2021.   - 100% pain relief  for about 2 months, right side is more bothersome at this time. left side is doing ok right now  - not sleeping well due to pain, sleeping on couch cause bed is so high    Pain scores:  Pain intensity on average is 6 on a scale of 0-10.     Medications:       Current pain medications:  Cyclobenzaprine 10 mg q hs prn and 5 mg BID prn - Just using at night. Not helpful for pain but helps relax at night.   Venlafaxine 37.5 +75 mg daily  Gabapentin 600 mg TID - [facility]  Tylenol 1,000 mg TID prn - using 1-2 times per day typically prior to bed - ?  Essential oils - [facility]         Other relevant medications:  NA    Current calculated MME: 0         Previous pain medications:  Aspercrembe, biofreeze, bengay,   CBD topical - takes edge off    Past pain treatments:  PT: Yes - twice since 2016. about a year ago - [facility]. She does stretches at home. Used to walk more in the past.   TENs Unit: Yes - [facility]  Injections: Bilateral L5-S1 facet joint injections - [facility] for 2 months  Self-care:   Ice - [facility], heat - [facility]  Surgeries related to pain: None  Alternative Therapies:              Chiropractic: Yes - [facility]               Acupuncture: No  Other: None    Medications:  Current Outpatient Medications   Medication Sig Dispense Refill     ACETAMINOPHEN PO Take 325-750 mg by mouth every 6 hours as needed.         cyclobenzaprine (FLEXERIL) 5 MG tablet Take one tablet two times during day as needed and two tablets at bedtime as needed 120 tablet 0     Fexofenadine HCl (ALLEGRA ALLERGY PO) Take  by mouth.         gabapentin (NEURONTIN) 600 MG tablet Take 1 tablet (600 mg) by mouth 3 times daily 270 tablet 3     levothyroxine (SYNTHROID/LEVOTHROID) 150 MCG tablet Take 1 tablet (150 mcg) by mouth daily 90 tablet 0     levothyroxine (SYNTHROID/LEVOTHROID) 175 MCG tablet Take 1 tablet (175 mcg) by mouth daily 90 tablet 0     naproxen (NAPROSYN) 500 MG tablet Take 1 tablet (500 mg) by mouth 2 times daily (with meals) 30 tablet 0     venlafaxine (EFFEXOR-XR) 37.5  MG 24 hr capsule Take 1 capsule (37.5 mg) by mouth daily 90 capsule 1     venlafaxine (EFFEXOR-XR) 75 MG 24 hr capsule Take 1 capsule (75 mg) by mouth daily 90 capsule 1     Diagnostic tests:  MRI of Lumbar Spine was completed on 5/1/2021 was reviewed with the patient and shows:  FINDINGS: For numbering purposes, L1 is considered to have small ribs,  normal variant. Alignment is significant for subtle grade 1  retrolisthesis of L4 on L5 and mild grade 1 anterolisthesis of L5 on  S1. Bone marrow demonstrates mild lower lumbar spine degenerative  endplate change. In addition, edema is present surrounding the  bilateral L5-S1 facet joints. Conus medullaris is unremarkable  terminating at the level of the L1-2 disc.     Multiple nonspecific T2 hyperintense renal lesions which are  incompletely characterized, statistically likely benign cysts. Tiny  area of T2 hyperintense signal within the right liver which is  incompletely characterized, statistically likely benign.     Segmental Analysis:      T12-L1:  Disc height maintained. No herniation. Normal facet joints.  No foraminal or spinal canal stenosis.      L1-L2:  Disc height maintained. No herniation. Normal facet joints. No  foraminal or spinal canal stenosis.       L2-L3:  Minimal disc height loss. No herniation. Normal facet joints.  No foraminal or spinal canal stenosis.       L3-L4:  Disc height maintained. No herniation. Normal facet joints. No  foraminal or spinal canal stenosis.       L4-L5:  Disc height maintained. No herniation. Mild bilateral facet  arthropathy. No foraminal or spinal canal stenosis.       L5-S1:  Mild disc height loss. Disc bulge with uncovering. Severe  bilateral facet arthropathy with marrow edema suggestive of active  arthropathy. Multiple synovial cysts are present projecting off the  facet joints, including a 6 mm synovial cyst projecting anteriorly off  the left facet joint which contributes to moderate-to-severe left  foraminal  stenosis. Mild right foraminal stenosis. Mild spinal canal  stenosis.                                                                      IMPRESSION:  Severe bilateral facet arthropathy at L5-S1 with synovial  cysts, grade 1 anterolisthesis, and bilateral foraminal stenosis, left  worse than right, related to anteriorly projecting synovial cyst.    XR of Lumbar spine on 8/18/2016:  IMPRESSION:  Thoracic scoliosis convex to the left. Five lumbar-type  vertebral bodies. No fracture, destructive lesion or significant  degenerative change. Surgical clips in the right upper quadrant.    EMG/Testing:  NA    Labs:   No recent labs    Physical Exam:  Blood pressure 129/82, pulse 85, last menstrual period 08/12/2012, SpO2 97 %, not currently breastfeeding.  General: A&O x 3  Resp: breathing unlabored on room air  MSK: Pain with lumbar extension and rotation, tender to palpation right lumbar paraspinals and gluteal muscualteure.       Caridad Montez MD  [facility]     BILLING TIME DOCUMENTATION:   The total TIME spent on this patient on the date of the encounter/appointment was 20 minutes.      TOTAL TIME includes:   Time spent preparing to see the patient (reviewing records and tests)   Time spent face to face (or over the phone) with the patient   Time spent ordering tests, medications, procedures and referrals   Time spent documenting clinical information in Epic

## 2021-07-14 NOTE — PATIENT INSTRUCTIONS
1. Order placed for right lumbar medial branch block to see if a radiofrequency ablation is helpful  2. Stop cyclobenzaprine. Start methocarbamol 500 mg four times daily as needed. Can be sedating.  Do not drive until you know how the medication affects you.  3. Stop naproxen. Start meloxicam 7.5 mg daily.   4. Follow up for injection.   Caridad Montez MD  [facility]     ----------------------------------------------------------------  Clinic Number:  [phone]     Call with any questions about your care and for scheduling assistance.     Calls are returned Monday through Friday between 8 AM and 4:30 PM. We usually get back to you within 2 business days depending on the issue/request.    If we are prescribing your medications:    For opioid medication refills, call the clinic or send a [employer] message 7 days in advance.  Please include:    Name of requested medication    Name of the pharmacy.    For non-opioid medications, call your pharmacy directly to request a refill. Please allow 3-4 days to be processed.     Per [place] State Law:    All controlled substance prescriptions must be filled within 30 days of being written.      For those controlled substances allowing refills, pickup must occur within 30 days of last fill.      We believe regular attendance is key to your success in our program!      Any time you are unable to keep your appointment we ask that you call us at least 24 hours in advance to cancel.This will allow us to offer the appointment time to another patient.     Multiple missed appointments may lead to dismissal from the clinic.

## 2021-07-20 ENCOUNTER — TELEPHONE (OUTPATIENT)
Dept: PALLIATIVE MEDICINE | Facility: CLINIC | Age: 56
End: 2021-07-20

## 2021-07-20 DIAGNOSIS — Z20.822 ENCOUNTER FOR LABORATORY TESTING FOR COVID-19 VIRUS: Primary | ICD-10-CM

## 2021-07-20 NOTE — TELEPHONE ENCOUNTER
Screening questions for MBB Injections:    Injection to be done at which interventional clinic site? [facility]    If [facility], tell patient that this procedure requires a COVID-19 lab test be done within 4 days of the procedure. Would you still like to move forward with scheduling the procedure?  Yes  If YES, let patient know that someone will call them to schedule the COVID-19 test. Route to nursing to enter order.      Instruct patient to arrive as directed prior to the scheduled appointment time:    [place] and [place]: 30 minutes before      Procedure ordered by Dr. Montez    Procedure ordered? Lumbar Medial Branch Block    What insurance would patient like us to bill for this procedure? Preferred One/BC      [employer]ICA: REQUIRES A PA FOR THE FIRST MBB     Worker's comp- Any injection DO NOT SCHEDULE and route to Jennifer Flores.      [employer] insurance - If scheduling an SI joint injection DO NOT SCHEDULE and route to Jennifer Flores.       MBBs must be scheduled with elapsed time interval of at least 2 weeks and not more than 6 months between the First MBB and the Second MBB for insurance purposes       [employer] - Any injection besides hip/shoulder/knee joint DO NOT SCHEDULE and route to Jennifer Flores. She will obtain PA and call pt back to schedule procedure or notify pt of denial.       [employer]- PA required for all MBB's      **[employer]BS- ALL need to be routed to Jennifer for review if a PA is needed**    IF SCHEDULING IN [place] AND NEEDS A PA, IT IS OKAY TO SCHEDULE. [place] HANDLES THEIR OWN PA'S AFTER THE PATIENT IS SCHEDULED. PLEASE SCHEDULE AT LEAST 1 WEEK OUT SO A PA CAN BE OBTAINED.    Any chance of pregnancy? NO   If YES, do NOT schedule and route to RN pool    Is an  needed? No     Patient has a drive home? (mandatory) Yes     Is patient taking any blood thinners (plavix, coumadin, jantoven, warfarin, heparin, pradaxa or dabigatran )? No    If hold needed, do NOT  schedule, route to RN pool     Is patient taking any aspirin products? No     If more than 325mg/day, OK to schedule; Instruct pt to decrease to less than 325 mg for 7 days AND route to RN pool    For CERVICAL procedures, hold all aspirin products for 6 days.     Tell pt that if aspirin product is not held for 6 days, the procedure WILL BE cancelled.      Does the patient have a bleeding or clotting disorder? No    If YES, okay to schedule AND route to RN nurse pool    **For any patients with platelet count <100, must be forwarded to provider**    Is patient diabetic? NO If YES, have them bring their glucometer.    Does patient have an active infection or treated for one within the past week? No    Is patient currently taking any antibiotics?  No    For patients on chronic, preventative, or prophylactic antibiotics, procedures may be scheduled.     For patients on antibiotics for active or recent infection:antibiotic course must have been completed for 4 days    Is patient currently taking any steroid medications? (i.e. Prednisone, Medrol)  No     For patients on steroid medications, course must have been completed for 4 days    Is patient actively being treated for cancer or immunocompromised? No   If YES, do NOT schedule and route to RN    Are you able to get on and off an exam table with minimal or no assistance? Yes   If NO, do NOT schedule and route to RN    Are you able to roll over and lay on your stomach with minimal or no assistance? Yes   If NO, do NOT schedule and route to RN    Any allergies to contrast dye, iodine, shellfish, or numbing and steroid medications? No  (If so, inform nursing and note in scheduling comments.)    Allergies: Ibuprofen, Monosodium glutamate, and Oxycodone     Does patient have an MRI/CT?  Not Applicable  Check Procedure Scheduling Grid to see if required.      Was the MRI done within the last 3 years?  NA    If yes, where was the MRI done i.e.[facility], [facility], [place],  [facility] etc?       If no, do not schedule and route to nursing    If MRI was not done at [place], [facility] or [facility] do NOT schedule and route to nursing.      If pt has an imaging disc, the injection MAY be scheduled but pt has to bring disc to appt.     If they show up without the disc the injection cannot be done      Medial Branch Block Pre-Procedure Instructions    It is okay to take long acting pain medications (if you are on them) the day of the procedure but try not to take any short acting medications unless absolutely necessary.    YES: ok   Long acting meds would include: Gabapentin (Neurontin), MS Contin, Oxycontin        Short acting meds would include:  Percocet, Oxycodone, Vicodin, Ibuprofen     The day of the procedure, you should try to do things that provoke your pain, since the injection is being done to see if it will relieve your pain . YES: ok     If your pain level is a 4 out of 10 or less on the day of the procedu re, please call [phone] to reschedule.  YES: ok     Reminders:      If you are started on any steroids or antibiotics between now and your appointment, you must contact us because it may affect our ability to perform your procedure ok      Instructed pt to arrive 30 minutes early for IV start if required. (Check Procedure Scheduling Grid) MOOKIE       If this is for a cervical MBB aspirin needs to be held for 6 days.  NO       Do not schedule procedures requiring IV placement in the first appointment of the day or first appointment after lunch. Do NOT schedule at 0745, 0815 or 1245.  ok      For patients 85 or older we recommend having an adult stay w/ them for the remainder of the day.      Does the patient have any questions? no

## 2021-07-21 NOTE — TELEPHONE ENCOUNTER
Patient only has Preferred one that is active, no PA required.    Okay to schedule      Jennifer CLIFFORD    [facility]

## 2021-07-21 NOTE — TELEPHONE ENCOUNTER
Pt already scheduled.    Jhoana VILLAFANA    [facility]

## 2021-07-23 ENCOUNTER — RADIOLOGY INJECTION OFFICE VISIT (OUTPATIENT)
Dept: PALLIATIVE MEDICINE | Facility: CLINIC | Age: 56
End: 2021-07-23
Payer: COMMERCIAL

## 2021-07-23 VITALS — HEART RATE: 75 BPM | DIASTOLIC BLOOD PRESSURE: 89 MMHG | OXYGEN SATURATION: 96 % | SYSTOLIC BLOOD PRESSURE: 135 MMHG

## 2021-07-23 DIAGNOSIS — M47.816 LUMBAR FACET ARTHROPATHY: ICD-10-CM

## 2021-07-23 PROCEDURE — 64494 INJ PARAVERT F JNT L/S 2 LEV: CPT | Mod: LT | Performed by: PHYSICAL MEDICINE & REHABILITATION

## 2021-07-23 PROCEDURE — 64493 INJ PARAVERT F JNT L/S 1 LEV: CPT | Mod: 50 | Performed by: PHYSICAL MEDICINE & REHABILITATION

## 2021-07-23 NOTE — NURSING NOTE
Discharge Information    IV Discontiued Time:  NA    Amount of Fluid Infused:  NA    Discharge Criteria = When patient returns to baseline or as per MD order    Consciousness:  Pt is fully awake    Circulation:  BP +/- 20% of pre-procedure level    Respiration:  Patient is able to breathe deeply    O2 Sat:  Patient is able to maintain O2 Sat >92% on room air    Activity:  Moves 4 extremities on command    Ambulation:  Patient is able to stand and walk or stand and pivot into wheelchair    Dressing:  Clean/dry or No Dressing    Notes:   Discharge instructions and AVS given to patient    Patient meets criteria for discharge?  YES    Admitted to PCU?  No    Responsible adult present to accompany patient home?  Yes    Signature/Title:    Leela Dunbar RN Care Coordinator  [facility] [place]

## 2021-07-23 NOTE — PATIENT INSTRUCTIONS
[facility]   Medial Branch Block Discharge Instructions      Your procedure was performed by:Dr. Caridad Montez    Medications used: lidocaine, bupivicaine omnipaque     You will need to complete the Pain Scale Log form and return it to us as soon as possible.  Once we have received the form, we will review it and call you to determine the next steps.     The form can be faxed to [phone] or mailed to:   [facility] [address], [address] [id number]      You may resume your regular activity after the injection.    Be cautious with walking since you may have numbness and/or weakness in the lower extremities for up to 6-8 hours after the procedure due to the effects of the local anesthetic.    Avoid driving for 6 hours. The local anesthetic could slow your reflexes    You may shower, however no swimming or tub baths or hot tubs for 24 hours following your procedure.    Your pain will return after the numbing medications have worn off.  You may use your current pain medications as needed.    Unless you have been directed to avoid the use of anti-inflammatory medications (NSAIDS), you may use medications such as ibuprofen, Aleve or Tylenol for pain control if needed. Some people find it helpful to alternate ibuprofen and Tylenol every 3 hours for a couple of days.    You may use ice packs 10-15 minutes three to four times a day at the injection site for comfort.     Do not use heat to painful areas for 6 to 8 hours. This will give the local anesthetic time to wear off and prevent you from accidentally burning your skin.     If you experience any of the following, call the Pain Clinic during work hours (Monday through Friday 8 am-4:30 pm) at [phone] or the Provider Line after hours at [phone]:  -Fever over 100 degree F  -Swelling, bleeding, redness, drainage, warmth at the injection site  -Progressive  weakness or numbness in your legs   -If lumbar, call if you have a loss of bowel or bladder function  -Unusual new onset of pain that is not improving

## 2021-07-23 NOTE — NURSING NOTE
Pre-procedure Intake    Have you been fasting? NA    If yes, for how long? NA    Are you taking a prescribed blood thinner such as coumadin, Plavix, Xarelto?    No    If yes, when did you take your last dose? NA    Do you take aspirin?  No    If cervical procedure, have you held aspirin for 6 days?   NA    Do you have any allergies to contrast dye, iodine, steroid and/or numbing medications?  NO    Are you currently taking antibiotics or have an active infection?  NO    Have you had a fever/elevated temperature within the past week? NO    Are you currently taking oral steroids? NO    Do you have a ? Yes       Are you pregnant or breastfeeding?  Not Applicable    Have you received the COVID-19 vaccine? Yes       If yes, was it your 1st, 2nd or only dose needed? Both    Date of most recent vaccine: Jan 2021    Notify provider and RNs if systolic BP >170, diastolic BP >100, P >100 or O2 sats < 90%

## 2021-07-23 NOTE — PROGRESS NOTES
[facility] - Procedure Note    Date of Service: 7/23/2021    Procedure performed: Bilateral L4,5 medial branch blocks #1  Diagnosis: Lumbar spondylosis; Lumbar facet arthropathy  : Caridad Montez MD   Anesthesia: None  Complications: None    Indications: Arabella Hart is a 56 year old female who is seen by me in clinic presents today for lumbar medial branch blocks. The patient describes low back which is aggravaated with extension/rotation to both sides. The patient reports minimal improvement with conservative treatment, including medications, exercise. She had good but short lived benefit with lumbar facet joint injections.     MRI was done on 5/1/2021 which showed   FINDINGS: For numbering purposes, L1 is considered to have small ribs,  normal variant. Alignment is significant for subtle grade 1  retrolisthesis of L4 on L5 and mild grade 1 anterolisthesis of L5 on  S1. Bone marrow demonstrates mild lower lumbar spine degenerative  endplate change. In addition, edema is present surrounding the  bilateral L5-S1 facet joints. Conus medullaris is unremarkable  terminating at the level of the L1-2 disc.     Multiple nonspecific T2 hyperintense renal lesions which are  incompletely characterized, statistically likely benign cysts. Tiny  area of T2 hyperintense signal within the right liver which is  incompletely characterized, statistically likely benign.     Segmental Analysis:      T12-L1:  Disc height maintained. No herniation. Normal facet joints.  No foraminal or spinal canal stenosis.      L1-L2:  Disc height maintained. No herniation. Normal facet joints. No  foraminal or spinal canal stenosis.       L2-L3:  Minimal disc height loss. No herniation. Normal facet joints.  No foraminal or spinal canal stenosis.       L3-L4:  Disc height maintained. No herniation. Normal facet joints. No  foraminal or spinal canal stenosis.       L4-L5:  Disc height maintained. No  herniation. Mild bilateral facet  arthropathy. No foraminal or spinal canal stenosis.       L5-S1:  Mild disc height loss. Disc bulge with uncovering. Severe  bilateral facet arthropathy with marrow edema suggestive of active  arthropathy. Multiple synovial cysts are present projecting off the  facet joints, including a 6 mm synovial cyst projecting anteriorly off  the left facet joint which contributes to moderate-to-severe left  foraminal stenosis. Mild right foraminal stenosis. Mild spinal canal  stenosis.                                                                      IMPRESSION:  Severe bilateral facet arthropathy at L5-S1 with synovial  cysts, grade 1 anterolisthesis, and bilateral foraminal stenosis, left  worse than right, related to anteriorly projecting synovial cyst.    Allergies:      Allergies   Allergen Reactions     Ibuprofen Itching     Monosodium Glutamate      MSG=sinus headache     Oxycodone         Vitals:  /76   Pulse 75   LMP 08/12/2012   SpO2 98%     Review of Systems: The patient denies recent fever, chills, illness, use of antibiotics or anticoagulants. All other 10-point review of systems negative.     Procedure:   Options/alternatives, benefits and risks were discussed with the patient including bleeding, infection, tissue trauma, exposure to radiation, reaction to medications, spinal cord injury, weakness, numbness and paralysis.  Questions were answered to her satisfaction and she agrees to proceed. Voluntary informed consent was obtained and signed.     After getting informed consent, patient was brought into the procedure suite and was placed in a prone position on the procedure table.   A Pause for the Cause was performed.  Patient was prepped and draped in sterile fashion.     Under AP fluoroscopic guidance the L5 vertebral bodies were identified. The C-arm was rotated to the oblique view to afford optimal visualization of the pedicles.  Lidocaine 1% was used to  anesthetize the skin at each level.  Under intermittent fluoroscopy, 25 gauge 3.5 inch Quinke spinal needles were positioned inferior and lateral to the intersection of the transverse process and pedicle at the Bilateral L5 levels, as well as the corresponding sacral alar notches. The needle positions were verified and optimized from the AP and lateral views.    The anatomic targets for the L4 medial nerve and L5 dorsal ramus (which functionally incorporates the medial branch) were the L5 transverse processes and sacral alar notch, with laterality as described above, resulting in blockade of the L5/S1 facet joints.    After negative aspiration, 1 cc's of Omnipaque 300 was injected to rule out intravascular injection.  9 cc's of omnipaque 300 was wasted.  Bupivacaine 0.25% 0.5 ml was injected at each location. The needles were removed. Hemostasis was achieved, the area was cleaned, and bandaids were placed when appropriate. The patient tolerated the procedure well, and was taken to the recovery room. Images were saved to PACS.      Pre procecedure pain score: 6/10   Post procedure pain score: 1/10.     Assessment/Plan: Arabella Hart is a 56 year old female s/p bilateral L4,5 medial branch block today for lumbar spondylosis, facet arthropathy.       1. The patient will continue to monitor progress, and they were given a pain diary to complete at home.  They will either fax or mail this back to us or bring it to their next appointment. We will determine the treatment plan after we review the diary.    2. The patient was advised to contact the [facility] for any of the following:   Fever, chills, or night sweats   New onset of pain, numbness, or weakness   Any questions/concerns regarding the procedure  If unable to contact the Pain Center, the patient was instructed to go to a local Emergency Room for any complications.   3. We will await the pain diary to determine the next step of the  treatment plan and call the patient to schedule.        Caridad Montez MD  [facility] [place]

## 2021-07-27 NOTE — TELEPHONE ENCOUNTER
Lumbar MBB#1 pain data reviewed. Max relief obtained:     At rest:                    Hr 1-5: 100%  Left facet load:        Hr 1-4: 88%, Hr 5: 75%  Right facet load:      Hr 1-4: 86% Hr 5: 71%  Normal activity:        Hr 1-4: 86% Hr 5: 71%     Pt would like to proceed with MBB #2. PT was a year ago [place].    Routing to [place] for insurance verification    Leela Dunbar RN  Care Coordinator  [facility]

## 2021-07-27 NOTE — TELEPHONE ENCOUNTER
[employer] RFA medical policy -PA is required        GUIDELINES:  Medical Necessity Criteria - Must satisfy the following: I, and any of II - V  I. Site of care - must satisfy any of the following: A - C  A. The procedure will be done in an office or ambulatory surgery center setting; or  B. The procedure may be allowed in a facility-based (outpatient hospital) setting if the nearest office or ambulatory surgery center capable of providing the service is 60 miles driving distance or greater from the member's home; or  C. The procedure may be allowed in a facility-based (outpatient hospital) setting if documentation supports that the member is considered at high risk for complications that require a hospital setting, such as but not limited to, member's physical status is classified as ASA III - VI, per the American Society of Anesthesiologists Physical Status Classification System (see Attachment B).  II. Initial request for non-pulsed radiofrequency ablation (RFA) for facet-mediated cervical, thoracic,  lumbosacral, or sacroiliac joint pain - must satisfy all of the following: A - D  A. Member has chronic (at least 6 months) cervical, thoracic, or lumbosacral pain suggestive of facet, or sacroiliac joint origin (such as, but not limited to, presence of pain that is aggravated by extension, rotation, or lateral bending of spine, and is not typically associated with any neurological deficits) as evidenced by documentation in the medical record on history and physical exam; and    B. Member has failed at least 3 months of conservative therapy (such as, but not limited to, activity modification, pharmacotherapies [analgesics, NSAIDs, and muscle relaxants], spinal manipulation, steroid injections, physical therapy [including muscle reconditioning], a structured home exercise program, and weight loss [if indicated]), or a chronic back program; and    C. No prior fusion surgery at the level where treatment is being  considered; and    D. Member has undergone at least 1 anesthetic block(s) of the involved facet, medial, primary dorsal ramus, or sacral lateral branch nerve - both of the followin and 2     1. Performed within 6 months prior to the procedure; and  2. Resulted in at least a 50% reduction in pain.    [Note: The third occipital nerve is the superficial medial branch of C3 dorsal ramus. It supplies the C2-C3 facet (zygapophysial joint) while crossing the joint laterally and within the allowed indications]    Jennifer CLIFFORD    [facility]

## 2021-07-27 NOTE — TELEPHONE ENCOUNTER
Faxed completed PA form and supporting documentation for LRFA to PO.  Right fax confirmation          Jennifer CLIFFORD    [place] [facility]

## 2021-07-28 ENCOUNTER — TELEPHONE (OUTPATIENT)
Dept: PALLIATIVE MEDICINE | Facility: CLINIC | Age: 56
End: 2021-07-28

## 2021-07-28 NOTE — TELEPHONE ENCOUNTER
Josiah had a lumbar MBB on 7/23/21. This would not directly affect her neck. She may have aggravated her neck with positioning on the table. It is hard for me to make recommendations when we have never discussed neck pain during our clinic visits. Before making medication recommendations I will have to see her in clinic to evaluate the neck pain further and do an exam.      Caridad Montez MD  [facility]

## 2021-07-28 NOTE — TELEPHONE ENCOUNTER
Called Josiah. Correction pt had complaints of the lower back and not the neck.    Pt had the Lumbar MBB done 07/23/2021.  Block lasted for 6 hours. She understands this was temporary.  Since she had the procedure done, she thinks her nerves are irritated in the back from the procedure. Pain flare to her back for 6 days now.  Cant sleep.  She is taking her Meloxicam 7.5 mg daily and tylenol 3000 mg daily.  Using her gabapentin 900 mg TID (bumped on her own).  Called in sick to work.  Only time she is comfortable is standing.  Pain is worse when laying down.  Using ice alternating with heat.  Also taking methocarbamol 500 mg qid.  Her main issues are no sleep and pain to her neck when she is laying down.     Routing to provider to review and advise    Leela Dunbar RN  Care Coordinator  [facility]

## 2021-07-28 NOTE — TELEPHONE ENCOUNTER
Reason for call:  Other   Patient called regarding (reason for call): call back  Additional comments: Pt calling to state her pain level has returned after her block and she is wondering if she can increase the meloxicam. Pt states she can only sleep for an hour.    Phone number to reach patient:  Cell number on file:    Telephone Information:   Mobile [phone]       Best Time:  anytime    Can we leave a detailed message on this number?  YES    Travel screening: Not Applicable     Jhoana VILLAFANA    [facility]

## 2021-07-28 NOTE — TELEPHONE ENCOUNTER
She can increase Meloxicam to 15 mg daily. This is max dose. She can also increase dose of methocarbamol up to 1,000 mg QID prn.     Caridad Montez MD  [facility]

## 2021-07-28 NOTE — TELEPHONE ENCOUNTER
Called Josiah back.  Documentation corrected in earlier call.  Pt had complaints of the lower back and not the neck.  Pain is worse to the right lower back area. When she takes a step the buttock feels like it is on fire. No drainage, redness or swelling at the injection sites. No fever, No loss of bowel or bladder control.    Routing to provider to review and advise    Leela Dunbar RN  Care Coordinator  [facility]

## 2021-07-29 NOTE — TELEPHONE ENCOUNTER
"Called pt. Advised to increase Meloxicam and methocarbamol. She will call us when she needs refills as she will be \"out early\".   She did increase gabapentin to 900mg at HS- is taking 600-600-900. Did not notice big difference with this increase. Wondered about increase or transition to Lyrica?  She states that she has missed work x2 as she is unable to sit in car for the 45 minute drive. 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 Advised we typically do not provide work note/ restriction but I would inquire about her 3 day absence.    Ok to leave detailed messages    Brie FORTE, RN Care Coordinator  [facility]  [facility] Management        "

## 2021-07-29 NOTE — TELEPHONE ENCOUNTER
PA approved.  Effective date: 7/27/21-7/26/22  PA reference #: [id number]  Pt. notified:   OKAY TO SCHEDULE WITH DR ROJELIO CLIFFORD    [place] [facility]

## 2021-07-29 NOTE — TELEPHONE ENCOUNTER
Is this her typical pain that has returned or are these new symptoms? I would recommend an in person clinic visit for evaluation if these are new symptoms.    Caridad Montez MD  [facility]

## 2021-07-30 NOTE — TELEPHONE ENCOUNTER
RFA 09/02/21    Please make sure you arrive 30 minutes prior to your scheduled time to have the IV placed.      You will need to have a Covid test prior the appointment, it must be within 4 days of the appointment. The scheduling office should call you to make this appointment but if you do not receive a call, please call them at [phone].     You will need to reschedule appointment if been in contact with some who has been suspected or confirmed of covid-19. New or worsening symptoms of cough, fever, rash or shortness of breath.      You will need to fast for at least 6 hours prior to the appointment.     Reminded patient they will need a  for this appointment and requested that the  stays out of the clinic unless its medically necessary.     Reminded patient that both patient and  must wear a mask during their visit    **Covid lab entered

## 2021-07-30 NOTE — TELEPHONE ENCOUNTER
Called pt. Relayed below. She states that she is feeling better today. The increase in medication was helpful and she is able walk, slept better last night. She will follow up after RFA- advised looks like our schedulers are working on PA for her. Explained RFA process and what to expect    Brie FORTE, RN Care Coordinator  [facility]  [facility]

## 2021-07-30 NOTE — TELEPHONE ENCOUNTER
Screening Questions for RFA Procedure      Procedure ordered? LRFA    What insurance are we billing for this procedure?  [employer]  IF SCHEDULING IN [place], IT IS OKAY TO SCHEDULE. [place] HANDLES THEIR OWN PA'S AFTER THE PATIENT IS SCHEDULED. PLEASE SCHEDULE AT LEAST 1 WEEK OUT SO A PA CAN BE OBTAINED.    Has patient had this injection before? No  This procedure requires that a COVID-19 lab test be done within 4 days of the procedure.   If patient asks why? We will not always be able to maintain a 6' distance, the procedure takes a long time, there will be more people in a smaller area, and use of sedation medication increases the risk of respiratory treatments.    Would you still like to move forward with scheduling the procedure?  Yes  If YES, let patient know that someone will call them to schedule the COVID-19 test and that they will only receive a call back if the result is positive. Route to nursing to enter order.   Any chance of pregnancy? NO   If YES, do NOT schedule and route to RN pool     Is  Needed?: No  Will patient have a ?  Yes   If pt is given sedation meds, no driving for 24 hours.  Is pt taking a cab or transportation service? NO        If so will need to be accompanied by an adult too (friend/family member) in order for IV sedation to be given.      Per [place] Policy:  Outpatients are to have responsible adult or family member to accompany them at discharge and drive them home. A service providing medically trained drivers or attendants would be acceptable. Public transportation would not be acceptable unless the patient is accompanied by a responsible adult or family member.  Is patient taking any blood thinners (i.e. plavix, coumadin, jantoven, warfarin, heparin, pradaxa or dabigatran, etc)? No   If YES, do NOT schedule, and route to RN pool    Is patient taking any aspirin products? No     If more than 325mg/day, OK to schedule; Instruct pt to decrease to less than 325  mg for 7 days AND route to RN pool    For CERVICAL procedures, hold all aspirin products for 6 days.     Tell pt that if aspirin product is not held for 6 days, the procedure WILL BE cancelled.      Does the patient have a bleeding or clotting disorder? No     If YES, it it OKAY to schedule AND route to RN pool    **For any patients with platelet count <100, must be forwarded to provider**    Is patient diabetic? No If YES, have them bring their glucometer.    Does patient have an active infection or treated for one within the past week? No   If YES, do NOT schedule and route to RN nurse pool     Is patient currently taking any antibiotics?  No    For patients on chronic, preventative, or prophylactic antibiotics, procedures may be scheduled.     For patients on antibiotics for active or recent infection:antibiotic course must have been completed for 4 days    Is patient currently taking any steroid medications? (i.e. Prednisone, Medrol)  No     For patients on steroid medications, course must have been completed for 4 days    Is patient actively being treated for cancer or immunocompromised, including the spleen having been removed? No  If YES, do NOT schedule and route to RN pool     Any history of complications with sedation medications?  NO   If YES, OK to schedule AND route to RN pool     Any history of sleep apnea?  NO   If YES, OK to schedule AND route to RN pool     Any cardiac history?  NO   If YES, OK to schedule AND route to RN pool     Do you have an implanted pacemaker, ICD (implanted cardiac device) or AICD (automatic implanted cardiac device)?  NO    If YES, do NOT schedule AND route to RN pool.     Obtain name of device :       Obtain name of cardiologist:       Do you have an implanted stimulator?  NO    If YES, OK to schedule AND route to nursing.     Instruct patient to bring in the remote to the appointment and it will need to be turned off.  reviewed      Does patient have an allergy  to contrast dye, iodine or shellfish?  No   If YES, OK to schedule. Route to RN pool AND add allergy information to appointment notes    Are you able to get on and off an exam table with minimal or no assistance? Yes   If NO, do NOT schedule and route to RN pool    Are you able to roll over and lay on your stomach with minimal or no assistance? Yes   If NO, do NOT schedule and route to RN pool    Reminders:    If you are started on any steroids or antibiotics between now and your appointment, you must contact us because it may affect our ability to perform your procedure.  Yes    Informed patient that s/he needs to fast for 6 hours before procedure?  YES    Informed patient that it is OK to take normal medications with sips of water, especially blood pressure medications, before the procedure and must hold blood thinners as instructed.  Yes    Informed patient to arrive 30 minutes before procedure time to have an IV inserted.  reviewed   Do NOT schedule at 0745, 0815 or 1245.  reviewed     All radiofrequency ablations are in a 90 minute time slot.  reviewed

## 2021-08-02 ENCOUNTER — OFFICE VISIT (OUTPATIENT)
Dept: FAMILY MEDICINE | Facility: CLINIC | Age: 56
End: 2021-08-02
Payer: COMMERCIAL

## 2021-08-02 VITALS
HEART RATE: 98 BPM | SYSTOLIC BLOOD PRESSURE: 108 MMHG | TEMPERATURE: 98.3 F | OXYGEN SATURATION: 97 % | DIASTOLIC BLOOD PRESSURE: 84 MMHG | BODY MASS INDEX: 34.66 KG/M2 | RESPIRATION RATE: 16 BRPM | WEIGHT: 208 LBS | HEIGHT: 65 IN

## 2021-08-02 DIAGNOSIS — F41.1 GAD (GENERALIZED ANXIETY DISORDER): ICD-10-CM

## 2021-08-02 DIAGNOSIS — M54.30 SCIATIC LEG PAIN: ICD-10-CM

## 2021-08-02 DIAGNOSIS — M79.18 MYOFASCIAL PAIN: ICD-10-CM

## 2021-08-02 DIAGNOSIS — E06.3 HYPOTHYROIDISM DUE TO HASHIMOTO'S THYROIDITIS: ICD-10-CM

## 2021-08-02 DIAGNOSIS — Z00.00 ROUTINE GENERAL MEDICAL EXAMINATION AT A HEALTH CARE FACILITY: Primary | ICD-10-CM

## 2021-08-02 DIAGNOSIS — R50.9 FEVER AND CHILLS: ICD-10-CM

## 2021-08-02 LAB
BASOPHILS # BLD AUTO: 0.1 10E3/UL (ref 0–0.2)
BASOPHILS NFR BLD AUTO: 1 %
CHOLEST SERPL-MCNC: 243 MG/DL
CRP SERPL-MCNC: 23 MG/L (ref 0–8)
EOSINOPHIL # BLD AUTO: 0.4 10E3/UL (ref 0–0.7)
EOSINOPHIL NFR BLD AUTO: 3 %
ERYTHROCYTE [DISTWIDTH] IN BLOOD BY AUTOMATED COUNT: 15.8 % (ref 10–15)
FASTING STATUS PATIENT QL REPORTED: YES
HBA1C MFR BLD: 6 % (ref 0–5.6)
HCT VFR BLD AUTO: 43.5 % (ref 35–47)
HDLC SERPL-MCNC: 41 MG/DL
HGB BLD-MCNC: 13.6 G/DL (ref 11.7–15.7)
IMM GRANULOCYTES # BLD: 0.1 10E3/UL
IMM GRANULOCYTES NFR BLD: 1 %
LDLC SERPL CALC-MCNC: 173 MG/DL
LYMPHOCYTES # BLD AUTO: 2.8 10E3/UL (ref 0.8–5.3)
LYMPHOCYTES NFR BLD AUTO: 23 %
MCH RBC QN AUTO: 30.7 PG (ref 26.5–33)
MCHC RBC AUTO-ENTMCNC: 31.3 G/DL (ref 31.5–36.5)
MCV RBC AUTO: 98 FL (ref 78–100)
MONOCYTES # BLD AUTO: 1.1 10E3/UL (ref 0–1.3)
MONOCYTES NFR BLD AUTO: 9 %
NEUTROPHILS # BLD AUTO: 7.8 10E3/UL (ref 1.6–8.3)
NEUTROPHILS NFR BLD AUTO: 63 %
NONHDLC SERPL-MCNC: 202 MG/DL
NRBC # BLD AUTO: 0 10E3/UL
NRBC BLD AUTO-RTO: 0 /100
PLATELET # BLD AUTO: 347 10E3/UL (ref 150–450)
RBC # BLD AUTO: 4.43 10E6/UL (ref 3.8–5.2)
T4 FREE SERPL-MCNC: 0.77 NG/DL (ref 0.76–1.46)
TRIGL SERPL-MCNC: 146 MG/DL
TSH SERPL DL<=0.005 MIU/L-ACNC: 12.78 MU/L (ref 0.4–4)
WBC # BLD AUTO: 12.3 10E3/UL (ref 4–11)

## 2021-08-02 PROCEDURE — 84443 ASSAY THYROID STIM HORMONE: CPT | Performed by: FAMILY MEDICINE

## 2021-08-02 PROCEDURE — 80061 LIPID PANEL: CPT | Performed by: FAMILY MEDICINE

## 2021-08-02 PROCEDURE — 84439 ASSAY OF FREE THYROXINE: CPT | Performed by: FAMILY MEDICINE

## 2021-08-02 PROCEDURE — 86140 C-REACTIVE PROTEIN: CPT | Performed by: FAMILY MEDICINE

## 2021-08-02 PROCEDURE — 99396 PREV VISIT EST AGE 40-64: CPT | Performed by: FAMILY MEDICINE

## 2021-08-02 PROCEDURE — 36415 COLL VENOUS BLD VENIPUNCTURE: CPT | Performed by: FAMILY MEDICINE

## 2021-08-02 PROCEDURE — 85025 COMPLETE CBC W/AUTO DIFF WBC: CPT | Performed by: FAMILY MEDICINE

## 2021-08-02 PROCEDURE — 83036 HEMOGLOBIN GLYCOSYLATED A1C: CPT | Performed by: FAMILY MEDICINE

## 2021-08-02 PROCEDURE — 99214 OFFICE O/P EST MOD 30 MIN: CPT | Mod: 25 | Performed by: FAMILY MEDICINE

## 2021-08-02 RX ORDER — GABAPENTIN 300 MG/1
300 CAPSULE ORAL AT BEDTIME
Qty: 90 CAPSULE | Refills: 3 | Status: SHIPPED | OUTPATIENT
Start: 2021-08-02 | End: 2022-10-27

## 2021-08-02 RX ORDER — LEVOTHYROXINE SODIUM 175 UG/1
175 TABLET ORAL DAILY
Qty: 90 TABLET | Refills: 3 | Status: SHIPPED | OUTPATIENT
Start: 2021-08-02 | End: 2021-08-03

## 2021-08-02 RX ORDER — GABAPENTIN 600 MG/1
600 TABLET ORAL 3 TIMES DAILY
Qty: 270 TABLET | Refills: 3 | Status: SHIPPED | OUTPATIENT
Start: 2021-08-02 | End: 2022-10-18

## 2021-08-02 RX ORDER — MELOXICAM 15 MG/1
15 TABLET ORAL DAILY
Qty: 30 TABLET | Refills: 0 | Status: SHIPPED | OUTPATIENT
Start: 2021-08-02 | End: 2021-09-03

## 2021-08-02 RX ORDER — METHOCARBAMOL 500 MG/1
1000 TABLET, FILM COATED ORAL 4 TIMES DAILY PRN
Qty: 240 TABLET | Refills: 0 | Status: SHIPPED | OUTPATIENT
Start: 2021-08-02 | End: 2021-09-01

## 2021-08-02 RX ORDER — VENLAFAXINE HYDROCHLORIDE 150 MG/1
150 CAPSULE, EXTENDED RELEASE ORAL DAILY
Qty: 90 CAPSULE | Refills: 3 | Status: SHIPPED | OUTPATIENT
Start: 2021-08-02 | End: 2022-07-26

## 2021-08-02 SDOH — ECONOMIC STABILITY: TRANSPORTATION INSECURITY
IN THE PAST 12 MONTHS, HAS THE LACK OF TRANSPORTATION KEPT YOU FROM MEDICAL APPOINTMENTS OR FROM GETTING MEDICATIONS?: NO

## 2021-08-02 SDOH — HEALTH STABILITY: PHYSICAL HEALTH: ON AVERAGE, HOW MANY DAYS PER WEEK DO YOU ENGAGE IN MODERATE TO STRENUOUS EXERCISE (LIKE A BRISK WALK)?: 2 DAYS

## 2021-08-02 SDOH — HEALTH STABILITY: PHYSICAL HEALTH: ON AVERAGE, HOW MANY MINUTES DO YOU ENGAGE IN EXERCISE AT THIS LEVEL?: 20 MIN

## 2021-08-02 SDOH — ECONOMIC STABILITY: INCOME INSECURITY: HOW HARD IS IT FOR YOU TO PAY FOR THE VERY BASICS LIKE FOOD, HOUSING, MEDICAL CARE, AND HEATING?: NOT VERY HARD

## 2021-08-02 SDOH — ECONOMIC STABILITY: TRANSPORTATION INSECURITY
IN THE PAST 12 MONTHS, HAS LACK OF TRANSPORTATION KEPT YOU FROM MEETINGS, WORK, OR FROM GETTING THINGS NEEDED FOR DAILY LIVING?: NO

## 2021-08-02 SDOH — ECONOMIC STABILITY: INCOME INSECURITY: IN THE LAST 12 MONTHS, WAS THERE A TIME WHEN YOU WERE NOT ABLE TO PAY THE MORTGAGE OR RENT ON TIME?: NO

## 2021-08-02 SDOH — ECONOMIC STABILITY: FOOD INSECURITY: WITHIN THE PAST 12 MONTHS, YOU WORRIED THAT YOUR FOOD WOULD RUN OUT BEFORE YOU GOT MONEY TO BUY MORE.: NEVER TRUE

## 2021-08-02 SDOH — ECONOMIC STABILITY: FOOD INSECURITY: WITHIN THE PAST 12 MONTHS, THE FOOD YOU BOUGHT JUST DIDN'T LAST AND YOU DIDN'T HAVE MONEY TO GET MORE.: NEVER TRUE

## 2021-08-02 ASSESSMENT — ENCOUNTER SYMPTOMS
NAUSEA: 0
CHILLS: 0
COUGH: 0
HEADACHES: 0
CONSTIPATION: 0
HEARTBURN: 0
FEVER: 0
DYSURIA: 0
MYALGIAS: 0
NERVOUS/ANXIOUS: 1
JOINT SWELLING: 0
ARTHRALGIAS: 1
FREQUENCY: 0
PALPITATIONS: 0
ABDOMINAL PAIN: 0
PARESTHESIAS: 0
BREAST MASS: 0
DIZZINESS: 0
WEAKNESS: 0
HEMATURIA: 0
SHORTNESS OF BREATH: 0
EYE PAIN: 0
SORE THROAT: 0
HEMATOCHEZIA: 0
DIARRHEA: 0

## 2021-08-02 ASSESSMENT — LIFESTYLE VARIABLES
HOW MANY STANDARD DRINKS CONTAINING ALCOHOL DO YOU HAVE ON A TYPICAL DAY: 1 OR 2
HOW OFTEN DO YOU HAVE SIX OR MORE DRINKS ON ONE OCCASION: NEVER
HOW OFTEN DO YOU HAVE A DRINK CONTAINING ALCOHOL: MONTHLY OR LESS

## 2021-08-02 ASSESSMENT — SOCIAL DETERMINANTS OF HEALTH (SDOH)
HOW OFTEN DO YOU GET TOGETHER WITH FRIENDS OR RELATIVES?: NEVER
HOW OFTEN DO YOU ATTEND CHURCH OR RELIGIOUS SERVICES?: MORE THAN 4 TIMES PER YEAR
IN A TYPICAL WEEK, HOW MANY TIMES DO YOU TALK ON THE PHONE WITH FAMILY, FRIENDS, OR NEIGHBORS?: THREE TIMES A WEEK
DO YOU BELONG TO ANY CLUBS OR ORGANIZATIONS SUCH AS CHURCH GROUPS UNIONS, FRATERNAL OR ATHLETIC GROUPS, OR SCHOOL GROUPS?: YES

## 2021-08-02 ASSESSMENT — MIFFLIN-ST. JEOR: SCORE: 1536.42

## 2021-08-02 NOTE — PROGRESS NOTES
SUBJECTIVE:   CC: Arabella Hart is an 56 year old woman who presents for preventive health visit.     Patient has been advised of split billing requirements and indicates understanding: Yes     Healthy Habits:     Getting at least 3 servings of Calcium per day:  Yes    Bi-annual eye exam:  Yes    Dental care twice a year:  NO    Sleep apnea or symptoms of sleep apnea:  Daytime drowsiness    Diet:  Regular (no restrictions)    Frequency of exercise:  1 day/week    Duration of exercise:  Less than 15 minutes    Taking medications regularly:  Yes    Medication side effects:  Not applicable    PHQ-2 Total Score: 2    Additional concerns today:  No      Struggling with low back pain. Following with pain clinic.   She had a fibro flare after her last injection - managing with higher dose mobic and robaxin per pain clinic.   This helps her fall asleep but she wakes with pain in the middle of the night.     She feels her anxiety is higher compared to previous - due to COVID and her back issues. Currently on 112.5 mg effexor daily, tolerating well.     History of Hashimoto's, on levothyroxine daily, needs surveillance labs.       Today's PHQ-2 Score:   PHQ-2 ( 1999 Pfizer) 8/2/2021   Q1: Little interest or pleasure in doing things 1   Q2: Feeling down, depressed or hopeless 1   PHQ-2 Score 2   Q1: Little interest or pleasure in doing things Several days   Q2: Feeling down, depressed or hopeless Several days   PHQ-2 Score 2       Abuse: Current or Past (Physical, Sexual or Emotional) - No  Do you feel safe in your environment? Yes    Have you ever done Advance Care Planning? (For example, a Health Directive, POLST, or a discussion with a medical provider or your loved ones about your wishes): No, advance care planning information given to patient to review.  Patient declined advance care planning discussion at this time.    Social History     Tobacco Use     Smoking status: Current Some Day Smoker     Packs/day: 0.50      Years: 15.00     Pack years: 7.50     Types: Cigarettes     Smokeless tobacco: Never Used   Substance Use Topics     Alcohol use: Yes     Comment: 1-2 drinks seldom         Alcohol Use 8/2/2021   Prescreen: >3 drinks/day or >7 drinks/week? No   Prescreen: >3 drinks/day or >7 drinks/week? -   AUDIT SCORE  -       Reviewed orders with patient.  Reviewed health maintenance and updated orders accordingly - Yes  Patient Active Problem List   Diagnosis     Allergic rhinitis     Uric acid kidney stones     S/P hysterectomy     Hypothyroidism due to Hashimoto's thyroiditis     Irritable bowel syndrome without diarrhea     NAGI (generalized anxiety disorder)     Menopausal flushing     Tobacco use disorder     Past Surgical History:   Procedure Laterality Date     C LIGATE FALLOPIAN TUBE,POSTPARTUM  1998    Tubal Ligation     COLONOSCOPY N/A 12/11/2018    Procedure: COLONOSCOPY;  Surgeon: Vishnu Mandujano MD;  Location: [facility]     LAPAROSCOPIC CHOLECYSTECTOMY N/A 6/4/2015    Procedure: LAPAROSCOPIC CHOLECYSTECTOMY;  Surgeon: Maria Fernanda Regalado MD;  Location: [facility] OR     LAPAROSCOPIC HYSTERECTOMY TOTAL, BILATERAL SALPINGO-OOPHORECTOMY, COMBINED  8/14/2012    Procedure: COMBINED LAPAROSCOPIC HYSTERECTOMY TOTAL, SALPINGO-OOPHORECTOMY;  LAPAROSCOPIC HYSTERECTOMY TOTAL, Bilateral SALPINGO-OOPHORECTOMY, pelvic exam under anesthesia;  Surgeon: Jolene Baez MD;  Location: [facility] OR     ZZC NONSPECIFIC PROCEDURE  1983    Right Ankle Surgery       Social History     Tobacco Use     Smoking status: Current Some Day Smoker     Packs/day: 0.50     Years: 15.00     Pack years: 7.50     Types: Cigarettes     Smokeless tobacco: Never Used   Substance Use Topics     Alcohol use: Yes     Comment: 1-2 drinks seldom     Family History   Problem Relation Age of Onset     Breast Cancer Paternal Grandmother      Thyroid Disease Mother         Hypothyroid     Cancer Mother         Ovarian - age 61     Coronary Artery Disease Paternal  Grandfather      Hypertension Father         Heart complication after MI.       Cancer Father         Throat (Epiglottis) smoker     Heart Disease Father          of heart attack     Coronary Artery Disease Father      Breast Cancer Paternal Aunt         and P great aunt     Breast Cancer Paternal Aunt      Coronary Artery Disease Brother      Colon Cancer No family hx of            Breast Cancer Screening:  Any new diagnosis of family breast, ovarian, or bowel cancer? No    FHS-7: No flowsheet data found.    Mammogram Screening: Recommended annual mammography  Pertinent mammograms are reviewed under the imaging tab.    History of abnormal Pap smear: Status post benign hysterectomy. Health Maintenance and Surgical History updated.  PAP / HPV Latest Ref Rng & Units 2016   PAP (Historical) - NIL NIL HSIL(A)   HPV16 NEG Negative - -   HPV18 NEG Negative - -   HRHPV NEG Negative - -     Reviewed and updated as needed this visit by clinical staff  Tobacco  Allergies  Meds              Reviewed and updated as needed this visit by Provider    Meds               Review of Systems   Constitutional: Negative for chills and fever.   HENT: Negative for congestion, ear pain, hearing loss and sore throat.    Eyes: Negative for pain and visual disturbance.   Respiratory: Negative for cough and shortness of breath.    Cardiovascular: Negative for chest pain, palpitations and peripheral edema.   Gastrointestinal: Negative for abdominal pain, constipation, diarrhea, heartburn, hematochezia and nausea.   Breasts:  Negative for tenderness, breast mass and discharge.   Genitourinary: Negative for dysuria, frequency, genital sores, hematuria, pelvic pain, urgency, vaginal bleeding and vaginal discharge.   Musculoskeletal: Positive for arthralgias. Negative for joint swelling and myalgias.   Skin: Negative for rash.   Neurological: Negative for dizziness, weakness, headaches and paresthesias.  "  Psychiatric/Behavioral: Positive for mood changes. The patient is nervous/anxious.         OBJECTIVE:   /84 (Cuff Size: Adult Large)   Pulse 98   Temp 98.3  F (36.8  C) (Oral)   Resp 16   Ht 1.638 m (5' 4.5\")   Wt 94.3 kg (208 lb)   LMP 08/12/2012   SpO2 97%   BMI 35.15 kg/m    Physical Exam  GENERAL APPEARANCE: healthy, alert and no distress  EYES: Eyes grossly normal to inspection, PERRL and conjunctivae and sclerae normal  HENT: ear canals and TM's normal, nose and mouth without ulcers or lesions, oropharynx clear and oral mucous membranes moist  NECK: no adenopathy, no asymmetry, masses, or scars and thyroid normal to palpation  RESP: lungs clear to auscultation - no rales, rhonchi or wheezes  BREAST: normal without masses, tenderness or nipple discharge and no palpable axillary masses or adenopathy  CV: regular rate and rhythm, normal S1 S2, no S3 or S4, no murmur, click or rub, no peripheral edema and peripheral pulses strong  ABDOMEN: soft, nontender, no hepatosplenomegaly, no masses and bowel sounds normal  MS: no musculoskeletal defects are noted and gait is age appropriate without ataxia  SKIN: no suspicious lesions or rashes  NEURO: Normal strength and tone, sensory exam grossly normal, mentation intact and speech normal  PSYCH: mentation appears normal and affect normal/bright    Diagnostic Test Results:  Labs reviewed in Epic    ASSESSMENT/PLAN:   1. Routine general medical examination at a health care facility  - Lipid panel reflex to direct LDL Fasting  - Hemoglobin A1c    2. Myofascial pain - refills of her current meds per pain clinic, advised reduce dose as soon as able  - meloxicam (MOBIC) 15 MG tablet; Take 1 tablet (15 mg) by mouth daily  Dispense: 30 tablet; Refill: 0  - methocarbamol (ROBAXIN) 500 MG tablet; Take 2 tablets (1,000 mg) by mouth 4 times daily as needed for muscle spasms  Dispense: 240 tablet; Refill: 0    3. Sciatic leg pain - will add 300 mg GBP overnight to " "better help her with pain.   - gabapentin (NEURONTIN) 600 MG tablet; Take 1 tablet (600 mg) by mouth 3 times daily  Dispense: 270 tablet; Refill: 3  - gabapentin (NEURONTIN) 300 MG capsule; Take 1 capsule (300 mg) by mouth At Bedtime Take with 600 mg capsule  Dispense: 90 capsule; Refill: 3    4. Hypothyroidism due to Hashimoto's thyroiditis - surveillance labs today, refills  - levothyroxine (SYNTHROID/LEVOTHROID) 175 MCG tablet; Take 1 tablet (175 mcg) by mouth daily  Dispense: 90 tablet; Refill: 3  - TSH  - T4, free    5. NAGI (generalized anxiety disorder) - will increase dose of effexor to better help her manage her anxiety.   - venlafaxine (EFFEXOR-XR) 150 MG 24 hr capsule; Take 1 capsule (150 mg) by mouth daily  Dispense: 90 capsule; Refill: 3    Patient has been advised of split billing requirements and indicates understanding: Yes  COUNSELING:  Reviewed preventive health counseling, as reflected in patient instructions    Estimated body mass index is 35.15 kg/m  as calculated from the following:    Height as of this encounter: 1.638 m (5' 4.5\").    Weight as of this encounter: 94.3 kg (208 lb).      She reports that she has been smoking cigarettes. She has a 7.50 pack-year smoking history. She has never used smokeless tobacco.  Tobacco Cessation Action Plan:   Self help information given to patient      Ely Dozier MD  [facility]  "

## 2021-08-02 NOTE — LETTER
[facility]  [id number] [address] [address]  Phone: [phone]    08/02/21    Arabella M Tanner  [address]  [place] [address]      To whom it may concern:     Josiah missed work on Friday, July 30th due to illness.     She was seen in clinic today and evaluated.     She is able to return to work with no restrictions.       Sincerely,        Ely Dozier MD

## 2021-08-03 DIAGNOSIS — E06.3 HYPOTHYROIDISM DUE TO HASHIMOTO'S THYROIDITIS: Primary | ICD-10-CM

## 2021-08-03 RX ORDER — LEVOTHYROXINE SODIUM 200 UG/1
200 TABLET ORAL DAILY
Qty: 90 TABLET | Refills: 3 | Status: SHIPPED | OUTPATIENT
Start: 2021-08-03 | End: 2022-07-25

## 2021-08-13 NOTE — TELEPHONE ENCOUNTER
RFA scheduled 09/02/2021. Closing encounter    Leela Dunbar RN  Care Coordinator  [facility]

## 2021-08-30 ENCOUNTER — LAB (OUTPATIENT)
Dept: LAB | Facility: CLINIC | Age: 56
End: 2021-08-30
Attending: PHYSICAL MEDICINE & REHABILITATION
Payer: COMMERCIAL

## 2021-08-30 DIAGNOSIS — Z20.822 ENCOUNTER FOR LABORATORY TESTING FOR COVID-19 VIRUS: ICD-10-CM

## 2021-08-30 LAB — SARS-COV-2 RNA RESP QL NAA+PROBE: NEGATIVE

## 2021-08-30 PROCEDURE — U0005 INFEC AGEN DETEC AMPLI PROBE: HCPCS

## 2021-08-30 PROCEDURE — U0003 INFECTIOUS AGENT DETECTION BY NUCLEIC ACID (DNA OR RNA); SEVERE ACUTE RESPIRATORY SYNDROME CORONAVIRUS 2 (SARS-COV-2) (CORONAVIRUS DISEASE [COVID-19]), AMPLIFIED PROBE TECHNIQUE, MAKING USE OF HIGH THROUGHPUT TECHNOLOGIES AS DESCRIBED BY CMS-2020-01-R: HCPCS

## 2021-09-02 ENCOUNTER — MYC MEDICAL ADVICE (OUTPATIENT)
Dept: PALLIATIVE MEDICINE | Facility: CLINIC | Age: 56
End: 2021-09-02

## 2021-09-02 ENCOUNTER — RADIOLOGY INJECTION OFFICE VISIT (OUTPATIENT)
Dept: PALLIATIVE MEDICINE | Facility: CLINIC | Age: 56
End: 2021-09-02
Payer: COMMERCIAL

## 2021-09-02 VITALS — SYSTOLIC BLOOD PRESSURE: 132 MMHG | OXYGEN SATURATION: 99 % | DIASTOLIC BLOOD PRESSURE: 85 MMHG | HEART RATE: 79 BPM

## 2021-09-02 DIAGNOSIS — M79.18 MYOFASCIAL PAIN: ICD-10-CM

## 2021-09-02 DIAGNOSIS — M47.816 FACET ARTHROPATHY, LUMBAR: Primary | ICD-10-CM

## 2021-09-02 DIAGNOSIS — G89.18 PAIN ASSOCIATED WITH SURGICAL PROCEDURE: ICD-10-CM

## 2021-09-02 PROCEDURE — 64635 DESTROY LUMB/SAC FACET JNT: CPT | Mod: 50 | Performed by: PHYSICAL MEDICINE & REHABILITATION

## 2021-09-02 PROCEDURE — 64636 DESTROY L/S FACET JNT ADDL: CPT | Mod: 50 | Performed by: PHYSICAL MEDICINE & REHABILITATION

## 2021-09-02 RX ORDER — FENTANYL CITRATE 50 UG/ML
12.5-5 INJECTION, SOLUTION INTRAMUSCULAR; INTRAVENOUS EVERY 5 MIN PRN
Status: CANCELLED | OUTPATIENT
Start: 2021-09-02

## 2021-09-02 NOTE — Clinical Note
Bilateral lumbar RFA completed.    Raghav Miller [facility]

## 2021-09-02 NOTE — NURSING NOTE
Pre-procedure Intake    Have you been fasting? Yes    If yes, for how long? 6 hours    Are you taking a prescribed blood thinner such as coumadin, Plavix, Xarelto?    No    If yes, when did you take your last dose?     Do you take aspirin?  No    If cervical procedure, have you held aspirin for 6 days?   NA    Do you have any allergies to contrast dye, iodine, steroid and/or numbing medications?  NO    Are you currently taking antibiotics or have an active infection?  NO    Have you had a fever/elevated temperature within the past week? NO    Are you currently taking oral steroids? NO    Do you have a ? Yes       Are you pregnant or breastfeeding?  NO    Have you received the COVID-19 vaccine? Yes       If yes, was it your 1st, 2nd or only dose needed?     Date of most recent vaccine: Feb 2021    Notify provider and RNs if systolic BP >170, diastolic BP >100, P >100 or O2 sats < 90%

## 2021-09-02 NOTE — TELEPHONE ENCOUNTER
Received request from patient requesting refill(s) for meloxicam (MOBIC) 15 MG tablet    Last refilled on 08/05/21    Pt last seen on 07/14/21  Next appt scheduled for : none    Will facilitate refill.

## 2021-09-02 NOTE — NURSING NOTE
22 gauge Peripheral IV inserted into right anticubital      Discharge Information    IV Discontiued Time: 11:45    Amount of Fluid Infused: 50cc    Discharge Criteria = When patient returns to baseline or as per MD order    Consciousness:  Pt is fully awake    Circulation:  BP +/- 20% of pre-procedure level    Respiration:  Patient is able to breathe deeply    O2 Sat:  Patient is able to maintain O2 Sat >92% on room air    Activity:  Moves 4 extremities on command    Ambulation:  Patient is able to stand and walk or stand and pivot into wheelchair    Dressing:  Clean/dry or No Dressing    Notes:   Discharge instructions and AVS given to patient    Patient meets criteria for discharge?  YES    Admitted to PCU?  No    Responsible adult present to accompany patient home?  Yes    Signature/Title:    Ely Hope RN  RN Care Coordinator  [facility]

## 2021-09-02 NOTE — PATIENT INSTRUCTIONS
[facility]   Radiofrequency Ablation (RFA) Discharge Instructions     Today you saw:    Dr. Raghav Miller      You should anticipate procedural pain for up to 2 weeks.     You may receive a prescription for pain medication and you should take this as directed.     It may take up to 8 weeks to receive relief from the RFA     Follow up with your provider in 8 weeks.     If you received sedation before, during or after your procedure, for the next 24 hours you shall NOT:    -Drive    -Operate machinery    -Drink alcohol    -Sign any legal documents     You may resume your normal diet     You may resume your regular medications after the procedure     If you were holding your blood thinning medication, please restart taking it: N/A    Be cautious with walking. Numbness and/or weakness in the lower extremities may occur for up to 6-8 hours due to effect of local anesthetic    Avoid strenuous activity for the first 24 hours     You may resume your regular activities after 24 hours     You may shower, however no swimming, tub baths or hot tubs for 24 hours following your procedure     You may use ice packs 10-15 minutes three to four times a day at the injection site for comfort     Do not use heat to painful areas for 6 to 8 hours. This will give the local anesthetic time to wear off and prevent you from accidentally burning your skin.     Unless you have been directed to avoid the use of anti-inflammatory medications (NSAIDS), you may use medications such as ibuprofen, Aleve or Tylenol for pain control if needed.     If you experience any of the following, call the Pain Clinic during work hours (Monday through Friday 8 am-4:30 pm) at [phone] or the Provider Line after hours at [phone]:   -Fever over 100 degree F    -Swelling, bleeding, redness, drainage, warmth at the injection site    -Progressive weakness or numbness in your legs or arms    -Loss of bowel or bladder function     -Unusual headache that is not relieved by Tylenol    -Unusual new onset of pain that is not improving

## 2021-09-02 NOTE — PROGRESS NOTES
[facility] - Procedure Note    Date of Visit: 9/2/2021    Pre procedure Diagnosis: facet arthropathy   Post procedure Diagnosis: Same  Procedure performed: Bilateral Lumbar radiofrequency ablation   Anesthesia: Local anesthetic only  Complications: NONE  Operators: Raghav Miller DO    Indications:   Arabella Hart is a 56 year old female was sent by Dr. Montez for lumbar RFA.  They have a history of chronic bilateral low back pain.  Exam shows increased discomfort with extension and rotation and they have tried conservative treatment including oral medications and exercises.    MRI was done on 5/1/2021 which showed   FINDINGS: For numbering purposes, L1 is considered to have small ribs,  normal variant. Alignment is significant for subtle grade 1  retrolisthesis of L4 on L5 and mild grade 1 anterolisthesis of L5 on  S1. Bone marrow demonstrates mild lower lumbar spine degenerative  endplate change. In addition, edema is present surrounding the  bilateral L5-S1 facet joints. Conus medullaris is unremarkable  terminating at the level of the L1-2 disc.     Multiple nonspecific T2 hyperintense renal lesions which are  incompletely characterized, statistically likely benign cysts. Tiny  area of T2 hyperintense signal within the right liver which is  incompletely characterized, statistically likely benign.     Segmental Analysis:      T12-L1:  Disc height maintained. No herniation. Normal facet joints.  No foraminal or spinal canal stenosis.      L1-L2:  Disc height maintained. No herniation. Normal facet joints. No  foraminal or spinal canal stenosis.       L2-L3:  Minimal disc height loss. No herniation. Normal facet joints.  No foraminal or spinal canal stenosis.       L3-L4:  Disc height maintained. No herniation. Normal facet joints. No  foraminal or spinal canal stenosis.       L4-L5:  Disc height maintained. No herniation. Mild bilateral facet  arthropathy. No foraminal or  spinal canal stenosis.       L5-S1:  Mild disc height loss. Disc bulge with uncovering. Severe  bilateral facet arthropathy with marrow edema suggestive of active  arthropathy. Multiple synovial cysts are present projecting off the  facet joints, including a 6 mm synovial cyst projecting anteriorly off  the left facet joint which contributes to moderate-to-severe left  foraminal stenosis. Mild right foraminal stenosis. Mild spinal canal  stenosis.                                                                      IMPRESSION:  Severe bilateral facet arthropathy at L5-S1 with synovial  cysts, grade 1 anterolisthesis, and bilateral foraminal stenosis, left  worse than right, related to anteriorly projecting synovial cyst.    Allergies:      Allergies   Allergen Reactions     Ibuprofen Itching     Monosodium Glutamate      MSG=sinus headache     Oxycodone         Vitals:  /85   Pulse 79   LMP 08/12/2012   SpO2 99%     Review of Systems: The patient denies recent fever, chills, illness, use of antibiotics or anticoagulants. All other 10-point review of systems negative.     Physical Exam:   Resp: CTA bilaterally  CV: RRR no murmurs, rubs or gallops  Airway:  Mallampati Class 2    Arabella Hart had medial branch blocks on 7/23/21 showing appropriate pain relief, therefore radiofrequency ablation will be done.     Options/alternatives, benefits and risks were discussed with the patient including bleeding, infection, no pain relief, tissue trauma, exposure to radiation, reaction to medications including seizure, spinal cord injury,increased pain after the procedure, weakness, numbness or sensory changes and headache.   We also discussed risks of sedation, including reaction to medications and cardiovascular collapse.    Questions were answered to her satisfaction and she agrees to proceed. Voluntary informed consent was obtained and signed.     Medications were reviewed:  Yes   Pre-procedure pain score:  3/10    Procedure:  After getting informed consent, patient was brought into the procedure suite and was placed in a prone position on the procedure table.   A Pause for the Cause was performed.  Patient was prepped and draped in sterile fashion.     Arabella Hart had an IV line placed prior the procedure.  The C-arm was positioned in the Right oblique view to afford optimal view of the L4-L5 vertebral bodies. Lidocaine 1% was used to anesthetize the skin at each level.  At each level, a 100mm, 20G curved radiofrequency cannula with a 10mm active tip was positioned, overlying the intersection of the transverse process and pedicle at L5 and was advanced under intermittent fluoroscopy until it contacted the transverse process and notch, and the tip slightly overran that process, just lateral to the mamillary process.  The position of each cannula was verified and optimized in the oblique view and AP views.    In the AP view, another cannula was placed at the sacral alar notch.      The exact procedure was repeated on the left.    Each position was tested for motor and sensory stimulation, and was positioned so that stimulation was negative for stimuli outside the immediate area of the desired lesion.  Sensory stimulation was completed at 50 Hz, with max stimulation up to 0.8V.  Motor stimulation was completed at 2Hz, up to 2.0V.  Lidocaine 2% 0.9 ml was injected at each level, and a 90 second, 80 degree Centigrade lesion was generated.    The needles were then rotated within the pathway of the medial branch, and locations were evaluated with repeat imaging.  Motor testing was again completed, and showed appropriate stimulation.  A second lesion was then generated at each location.    The needles were withdrawn. Hemostasis was achieved, the area was cleaned, and bandaids were placed when appropriate.  The patient tolerated the procedure well, and was taken to the recovery room.    Images were saved to  PACS.      Post-procedure pain score: 0/10    Assessment/Plan: Arabella Hart is a 56 year old female s/p Bilateral lumbar RFA for chronic low back pain.     1. Following today's procedure, the patient was advised to contact the [facility] for any of the following:   Fever, chills, or night sweats   New onset of pain, numbness, or weakness   Any questions/concerns regarding the procedure  If unable to contact the Pain Center, the patient was instructed to go to a local Emergency Room for any complications.     2. The patient should follow-up with the referring provider for post-procedure evaluation in 4-6 weeks.            Raghav Miller DO  [facility]

## 2021-09-02 NOTE — TELEPHONE ENCOUNTER
Please process a refill of meloxicam (MOBIC) 15 MG tablet to       [id number] - [place] - [address]  [address] [place] [address]  Phone: [phone] Fax: [phone]

## 2021-09-03 RX ORDER — MELOXICAM 15 MG/1
15 TABLET ORAL DAILY PRN
Qty: 30 TABLET | Refills: 0 | Status: SHIPPED | OUTPATIENT
Start: 2021-09-03 | End: 2021-10-18

## 2021-09-03 NOTE — TELEPHONE ENCOUNTER
Signed Prescriptions:                        Disp   Refills    meloxicam (MOBIC) 15 MG tablet             30 tab*0        Sig: Take 1 tablet (15 mg) by mouth daily as needed for           moderate pain  Authorizing Provider: GLORIA DIAZ MD  [facility]

## 2021-09-30 ENCOUNTER — MYC MEDICAL ADVICE (OUTPATIENT)
Dept: PALLIATIVE MEDICINE | Facility: CLINIC | Age: 56
End: 2021-09-30

## 2021-09-30 NOTE — TELEPHONE ENCOUNTER
Agree with in-clinic visit to evaluate.    Raghav Miller DO  [facility]

## 2021-09-30 NOTE — TELEPHONE ENCOUNTER
From: Josiah Hart     Hi Dr. Montez!  I hope if the babies were born already, everyone is healthy. Question or problem. My right SI joint pain is back and has been the last couple of days. It has been getting worse each day. It started as a dull ache going up stairs and now it is a constant burning pain with increased pain with certain movement. (Stairs, sitting and standing). Everything else is much better. What do you suggest?     Thank you!  Josiah Hart.   Created: 9/30/2021 9:07 AM              09/02/21 Procedure performed: Bilateral Lumbar radiofrequency ablation       To: Josiah Hart      From: Ely Hope, RN      Created: 9/30/2021 1:26 PM        Hi Arabella! No babies yet! But Dr Montez is on a temporary leave of absence. I think it would be best to get you scheduled for an in clinic appointment with her colleague Dr Miller so he can take a look at that SI joint and make a recommendation or place any needed orders. The scheduling number for Dr Miller is [phone]. Thank you!     JANN Wheatley, RN  Care Coordinator  [facility]

## 2021-10-18 ENCOUNTER — MYC REFILL (OUTPATIENT)
Dept: PALLIATIVE MEDICINE | Facility: CLINIC | Age: 56
End: 2021-10-18

## 2021-10-18 DIAGNOSIS — M79.18 MYOFASCIAL PAIN: ICD-10-CM

## 2021-10-19 RX ORDER — MELOXICAM 15 MG/1
15 TABLET ORAL DAILY PRN
Qty: 30 TABLET | Refills: 0 | Status: SHIPPED | OUTPATIENT
Start: 2021-10-19 | End: 2021-11-22

## 2021-10-19 NOTE — TELEPHONE ENCOUNTER
[employer] message sent with Rx approval from provider.  Darrell  Pain Clinic Management Team

## 2021-10-19 NOTE — TELEPHONE ENCOUNTER
Refills have been requested for the following medications:         meloxicam (MOBIC) 15 MG tablet [Caridad Montez MD]     Preferred pharmacy: [id number] [place], [place] - [address]

## 2021-10-19 NOTE — TELEPHONE ENCOUNTER
Chief Complaint   Patient presents with     Refill Request     mobic      Refill request received via fax by pharmacy   [facility] #[id number] - [place], [place] - [address]       Pending Prescriptions:                       Disp   Refills    meloxicam (MOBIC) 15 MG tablet            30 tab*0            Sig: Take 1 tablet (15 mg) by mouth daily as needed           for moderate pain         Last Written Prescription Date:  9/3/21  Last Fill Quantity: 30,   # refills: 0  Last Office Visit with prescribing provider: 7/14/21  Future Office visit:    Next 5 appointments (look out 90 days)    Oct 27, 2021  8:00 AM  Return Visit with Raghav Miller MD  [facility] [place] ([facility] [place] ) [address] [address] [address] [phone]

## 2021-10-24 ENCOUNTER — HEALTH MAINTENANCE LETTER (OUTPATIENT)
Age: 56
End: 2021-10-24

## 2021-10-27 ENCOUNTER — OFFICE VISIT (OUTPATIENT)
Dept: PALLIATIVE MEDICINE | Facility: CLINIC | Age: 56
End: 2021-10-27
Payer: COMMERCIAL

## 2021-10-27 VITALS — DIASTOLIC BLOOD PRESSURE: 80 MMHG | HEART RATE: 96 BPM | OXYGEN SATURATION: 96 % | SYSTOLIC BLOOD PRESSURE: 130 MMHG

## 2021-10-27 DIAGNOSIS — M79.18 MYOFASCIAL PAIN: ICD-10-CM

## 2021-10-27 DIAGNOSIS — M53.3 CHRONIC RIGHT SACROILIAC PAIN: Primary | ICD-10-CM

## 2021-10-27 DIAGNOSIS — M47.816 FACET ARTHROPATHY, LUMBAR: ICD-10-CM

## 2021-10-27 DIAGNOSIS — G89.29 CHRONIC RIGHT SACROILIAC PAIN: Primary | ICD-10-CM

## 2021-10-27 PROCEDURE — 99214 OFFICE O/P EST MOD 30 MIN: CPT | Performed by: PHYSICAL MEDICINE & REHABILITATION

## 2021-10-27 RX ORDER — CYCLOBENZAPRINE HCL 5 MG
5-10 TABLET ORAL 3 TIMES DAILY PRN
Qty: 90 TABLET | Refills: 0 | Status: SHIPPED | OUTPATIENT
Start: 2021-10-27 | End: 2021-12-14

## 2021-10-27 ASSESSMENT — PAIN SCALES - GENERAL: PAINLEVEL: MODERATE PAIN (4)

## 2021-10-27 NOTE — PATIENT INSTRUCTIONS
1. I ordered flexeril, you can take 5-10mg three times daily as needed.    2. I ordered a right sacroiliac joint injection, you'll be called to schedule this.    Take care,    Raghav Miller DO  [facility]        ----------------------------------------------------------------  Clinic Number:  [phone]     Call with any questions about your care and for scheduling assistance.     Calls are returned Monday through Friday between 8 AM and 4:30 PM. We usually get back to you within 2 business days depending on the issue/request.    If we are prescribing your medications:    For opioid medication refills, call the clinic or send a Epom message 7 days in advance.  Please include:    Name of requested medication    Name of the pharmacy.    For non-opioid medications, call your pharmacy directly to request a refill. Please allow 3-4 days to be processed.     Per [place] State Law:    All controlled substance prescriptions must be filled within 30 days of being written.      For those controlled substances allowing refills, pickup must occur within 30 days of last fill.      We believe regular attendance is key to your success in our program!      Any time you are unable to keep your appointment we ask that you call us at least 24 hours in advance to cancel.This will allow us to offer the appointment time to another patient.     Multiple missed appointments may lead to dismissal from the clinic.

## 2021-10-27 NOTE — PROGRESS NOTES
[facility]    Date of visit: 10/8/2021      Assessment:  Arabella Hart is a 55 year old female with a past medical history significant for NAGI, IBS who presents with complaints of:     1. Chronic back pain: Chronic axial low back pain for last 2 years. No improvement with PT. Has associated stiffness in the mornings. Pain is aggravated with activity Etiology secondary to lumbar spondylosis, facet arthropathy. Lumbar RFA completed with significant improvement    2. Chronic right buttock and hip pain: Symptoms ongoing for 3+ years. No improvement with PT or lumbar RFA for this pain. On exam, they have findings suggestive of sacroiliac joint dysfunction. Sacroiliac joint injection order was placed.     Plan:  1. Therapies:  Continue current home exercise regimen.  1. Consider adding yoga exercises  2. Diagnostic Studies:None  3. Medication Management:    1. Stop methocarbamol QID prn.    2. Flexeril 5-10mg tid prn  3. Continue meloxicam 7.5 mg daily.   4. Procedures recommended:   1. RFA can be repeated every 6+ months  2. Right sacroiliac joint injection ordered  5. Follow up: 1 month post procedure.      DO [facility]           Chief complaint:   Chief Complaint   Patient presents with     Pain       Interval history:  Arabella Hart is a 56 year old female last seen by me on 9/2/21 for a lumbar RFA.        Since her last visit, Arabella Hart reports:    -They were previously being followed by Dr. Montez in clinic and are following up today with me as Dr. Montez is on leave.    -They had a lumbar RFA on 9/2/21 with significant improvement in their chronic low back and buttock pain.    -They're still having pain in the right buttocks laterally, this is at its worst if they're going up stairs.    -They have tried chiropractic for this without significant improvement.    -The pain is present constantly  and worsens with prolonged sitting or standing.    -Methocarbamol has been making them sleepy at work.    Pain scores:  Pain intensity on average is 5 on a scale of 0-10.     Medications:       Current pain medications:  Cyclobenzaprine 10 mg q hs prn and 5 mg BID prn - Just using at night. Not helpful for pain but helps relax at night.   Venlafaxine 37.5 +75 mg daily  Gabapentin 600 mg TID - [facility]  Tylenol 1,000 mg TID prn - using 1-2 times per day typically prior to bed - ?  Essential oils - [facility]          Other relevant medications:  NA     Current calculated MME: 0          Previous pain medications:  Aspercrembe, biofreeze, bengay,   CBD topical - takes edge off     Past pain treatments:  PT: Yes - twice since 2016. about a year ago - [facility]. She does stretches at home. Used to walk more in the past.   TENs Unit: Yes - [facility]  Injections: Bilateral L5-S1 facet joint injections - [facility] for 2 months  Self-care:   Ice - [facility], heat - [facility]  Surgeries related to pain: None  Alternative Therapies:              Chiropractic: Yes - [facility]               Acupuncture: No  Other: None         Side Effects: no side effect    Medications:  Current Outpatient Medications   Medication Sig Dispense Refill     ACETAMINOPHEN PO Take 325-750 mg by mouth every 6 hours as needed.         cyclobenzaprine (FLEXERIL) 5 MG tablet Take 1-2 tablets (5-10 mg) by mouth 3 times daily as needed for muscle spasms 90 tablet 0     Fexofenadine HCl (ALLEGRA ALLERGY PO) Take  by mouth.         gabapentin (NEURONTIN) 300 MG capsule Take 1 capsule (300 mg) by mouth At Bedtime Take with 600 mg capsule 90 capsule 3     gabapentin (NEURONTIN) 600 MG tablet Take 1 tablet (600 mg) by mouth 3 times daily 270 tablet 3     levothyroxine (SYNTHROID/LEVOTHROID) 200 MCG tablet Take 1 tablet (200 mcg) by mouth daily 90 tablet 3     meloxicam (MOBIC) 15 MG tablet Take 1 tablet (15 mg) by mouth daily as needed for moderate pain 30 tablet 0     naproxen (NAPROSYN) 500 MG tablet Take 1  tablet (500 mg) by mouth 2 times daily (with meals) 30 tablet 0     venlafaxine (EFFEXOR-XR) 150 MG 24 hr capsule Take 1 capsule (150 mg) by mouth daily 90 capsule 3       Medical History: any changes in medical history since they were last seen? No    Review of Systems:  Positive for: back pain, buttock pain  Any bowel or bladder problems: denies    Physical Exam:  Blood pressure 130/80, pulse 96, last menstrual period 08/12/2012, SpO2 96 %, not currently breastfeeding.  General: NAD, pleasant  Gait: Normal  MSK exam: Lumbar ROM is mildly reduced in all planes. Positive tiff, ganeslen, thigh thrust, yeoman's, jeannette finger sign on the right. Strength is 5/5 and symmetric in the lower extremities.      BILLING TIME DOCUMENTATION:   The total TIME spent on this patient on the date of the encounter/appointment was 35 minutes.      TOTAL TIME includes:   Time spent preparing to see the patient (reviewing records and tests)  Time spent face to face (or over the phone) with the patient   Time spent ordering tests, medications, procedures and referrals   Time spent Referring and communicating with other healthcare professionals   Time spent documenting clinical information in Epic         Raghav Miller DO  [facility]  10/27/2021

## 2021-11-15 ENCOUNTER — TELEPHONE (OUTPATIENT)
Dept: PALLIATIVE MEDICINE | Facility: CLINIC | Age: 56
End: 2021-11-15
Payer: COMMERCIAL

## 2021-11-15 NOTE — TELEPHONE ENCOUNTER
Screening Questions for Radiology Injections:    Injection to be done at which interventional clinic site? [facility]    If [facility] location, tell patient that this procedure requires a COVID-19 lab test be done within 4 days of the procedure. Would you still like to move forward with scheduling the procedure?  Not Applicable   If YES, let patient know that someone will call them to schedule the COVID-19 test and that they will only receive a call back if the result is positive. Route to nursing to enter order.     Instruct patient to arrive as directed prior to the scheduled appointment time:    [place] minutes before      [place]: 30 minutes before; if IV needed 1 hour before     Procedure ordered by Angela    Procedure ordered? right sacroiliac joint injection      Transforaminal Cervical PETER -  [place] does NOT have providers that do these- [facility] and [facility] do have providers that do    As a reminder, receiving steroids can decrease your body's ability to fight infection.   Would you still like to move forward with scheduling the injection?  Yes    What insurance would patient like us to bill for this procedure? [employer]      Worker's comp or MVA (motor vehicle accident) -Any injection DO NOT SCHEDULE and route to Jennifer Flores.      [facility] - For SI joint injections, DO NOT SCHEDULE and route Jennifer Flores.       ALL BCBS, [employer] and [employer]-Route to Jennifer for review DO NOT SCHEDULE      IF SCHEDULING IN [place] AND NEEDS A PA, IT IS OKAY TO SCHEDULE. [place] HANDLES THEIR OWN PA'S AFTER THE PATIENT IS SCHEDULED. PLEASE SCHEDULE AT LEAST 1 WEEK OUT SO A PA CAN BE OBTAINED.    Any chance of pregnancy? NO   If YES, do NOT schedule and route to RN pool    Is an  needed? No     Patient has a drive home? (mandatory) YES: INFORMED    Is patient taking any blood thinners (That is: Coumadin, Warfarin, Jantoven, Pradaxa Xarelto, Eliquis, Edoxaban,  Enoxaparin, Lovenox, Heparin, Arixtra, Fondaparinux, or Fragmin? OR Antiplatelet medication such as Plavix, Brilinta, or Effient? )? No   If hold needed, do NOT schedule, route to RN pool     Is patient taking any aspirin products (includes Excedrin and Fiorinal)? No     If more than 325mg/day, OK to schedule; Instruct pt to decrease to less than 325 mg for 7 days AND route to RN pool    For CERVICAL procedures, hold all aspirin products for 6 days.     Tell pt that if aspirin product is not held for 6 days, the procedure WILL BE cancelled.      Does the patient have a bleeding or clotting disorder? No     If YES, okay to schedule AND route to RN nurse pool    For any patients with platelet count <100, must be forwarded to provider    Any allergies to contrast dye, iodine, shellfish, or numbing and steroid medications? No    If YES, add allergy information to appointment notes AND route to the RN pool     If PETER and Contrast Dye / Iodine Allergy? DO NOT SCHEDULE, route to RN pool    Allergies: Ibuprofen, Monosodium glutamate, and Oxycodone     Is patient diabetic?  No  If YES, instruct them to bring their glucometer.    Does patient have an active infection or treated for one within the past week? No     Is patient currently taking any antibiotics?  No     For patients on chronic, preventative, or prophylactic antibiotics, procedures may be scheduled.     For patients on antibiotics for active or recent infection:antibiotic course must have been completed for 4 days    Is patient currently taking any steroid medications? (i.e. Prednisone, Medrol)  No     For patients on steroid medications, course must have been completed for 4 days    Is patient actively being treated for cancer or immunocompromised? No  If YES, do NOT schedule and route to RN pool     Are you able to get on and off an exam table with minimal or no assistance? Yes  If NO, do NOT schedule and route to RN pool    Are you able to roll over and lay on  your stomach with minimal or no assistance? Yes  If NO, do NOT schedule and route to RN pool     Has the patient had a flu shot or any other vaccinations within 7 days before or after the procedure.  No     Have you recently had a COVID vaccine or have plans to get it in the near future? No    If yes, explain that for the vaccine to work best they need to:       wait 1 week before and 1 week after getting Vaccine #1    wait 1 week before and 2 weeks after getting Vaccine #2    wait 1 week before and 2 weeks after getting Vaccine BOOSTER    If patient has concerns about the timing, send to RN pool     Does patient have an MRI/CT?  Not Applicable  Check Procedure Scheduling Grid to see if required.      Was the MRI done within the last 3 years?  NA    If yes, where was the MRI done i.e.[facility], [facility], [place], [facility] etc?       If no, do not schedule and route to RN pool    If MRI was not done at [place], [facility] or [facility] do NOT schedule and route to RN pool.      If pt has an imaging disc, the injection MAY be scheduled but pt has to bring disc to appt.     If they show up without the disc the injection cannot be done    Procedure Specific Instructions:      If celiac plexus block, informed patient NPO for 6 hours and that it is okay to take medications with sips of water, especially blood pressure medications  Not Applicable         If this is for a cervical procedure, informed patient that aspirin needs to be held for 6 days.   Not Applicable      If IV needed:    Do not schedule procedures requiring IV placement in the first appointment of the day or first appointment after lunch. Do NOT schedule at 0745, 0815 or 1245.     Instructed pt to arrive 30 minutes early for IV start if required. (Check Procedure Scheduling Grid)  Not Applicable    Reminders:      If you are started on any steroids or antibiotics between now and your appointment, you must contact us because the procedure may  need to be cancelled.  Yes      For all procedures except radiofrequency ablations (RFAs) and spinal cord stimulator (SCS) trials, informed patient:    IV sedation is not provided for this procedure.  If you feel that an oral anti-anxiety medication is needed, you can discuss this further with your referring provider or primary care provider.  The Pain Clinic provider will discuss specifics of what the procedure includes at your appointment.  Most procedures last 10-20 minutes.  We use numbing medications to help with any discomfort during the procedure.  Not Applicable      For patients 85 or older we recommend having an adult stay w/ them for the remainder of the day.       Does the patient have any questions?  NO  Jhoana Brandon  [facility]

## 2021-11-22 ENCOUNTER — MYC REFILL (OUTPATIENT)
Dept: PALLIATIVE MEDICINE | Facility: CLINIC | Age: 56
End: 2021-11-22
Payer: COMMERCIAL

## 2021-11-22 DIAGNOSIS — M79.18 MYOFASCIAL PAIN: ICD-10-CM

## 2021-11-22 NOTE — TELEPHONE ENCOUNTER
Refills have been requested for the following medications:         meloxicam (MOBIC) 15 MG tablet [Raghav Miller MD]     Preferred pharmacy: [facility] #[id number] - [place], [place] - [address]

## 2021-11-23 RX ORDER — MELOXICAM 15 MG/1
15 TABLET ORAL DAILY PRN
Qty: 30 TABLET | Refills: 3 | Status: SHIPPED | OUTPATIENT
Start: 2021-11-23 | End: 2022-04-22

## 2021-11-23 NOTE — TELEPHONE ENCOUNTER
Meloxicam refill approved.    Raghav Miller DO  [facility]

## 2021-11-23 NOTE — TELEPHONE ENCOUNTER
Chief Complaint   Patient presents with     Refill Request     mobic      Refill request received via [employer] by patient or caregiver   [id number] [place], [place] - [address]       Pending Prescriptions:                       Disp   Refills    meloxicam (MOBIC) 15 MG tablet            30 tab*0            Sig: Take 1 tablet (15 mg) by mouth daily as needed           for moderate pain         Last Written Prescription Date:  10/19/21  Last Fill Quantity: 30,   # refills: 0  Last Office Visit with prescribing provider: 10/27/21  Future Office visit: 12/2/21

## 2021-11-23 NOTE — TELEPHONE ENCOUNTER
[employer] message sent with Rx approval from provider.  Darrell  Pain Clinic Management Team

## 2021-11-30 NOTE — PROGRESS NOTES
[facility] - Procedure Note    Date of Service: 12/2/2021  Procedure performed: RIGHT sacroiliac joint injection  Diagnosis: Sacroiliac joint pain  : Snehal Nickerson MD   Anesthesia: none    Indications: Arabella Hart is a 56 year old female who is seen at the request of Dr. Miller for a sacroiliac joint injection. The patient describes right sided low back and buttock pain. Exam shows full strength and sensation in both lower extremities, tenderness of the sacroiliac (SI) joint, and positive FABERE on the right. The patient reports minimal improvement with conservative treatment, including previous RFA, medications and PT.    S/P lumbar RFA 9/2/2021 with Dr. Miller    Imaging:  MRI lUMBAR spine completed on 5/1/2021 showed:  FINDINGS: For numbering purposes, L1 is considered to have small ribs,  normal variant. Alignment is significant for subtle grade 1  retrolisthesis of L4 on L5 and mild grade 1 anterolisthesis of L5 on  S1. Bone marrow demonstrates mild lower lumbar spine degenerative  endplate change. In addition, edema is present surrounding the  bilateral L5-S1 facet joints. Conus medullaris is unremarkable  terminating at the level of the L1-2 disc.     Multiple nonspecific T2 hyperintense renal lesions which are  incompletely characterized, statistically likely benign cysts. Tiny  area of T2 hyperintense signal within the right liver which is  incompletely characterized, statistically likely benign.     Segmental Analysis:      T12-L1:  Disc height maintained. No herniation. Normal facet joints.  No foraminal or spinal canal stenosis.      L1-L2:  Disc height maintained. No herniation. Normal facet joints. No  foraminal or spinal canal stenosis.       L2-L3:  Minimal disc height loss. No herniation. Normal facet joints.  No foraminal or spinal canal stenosis.       L3-L4:  Disc height maintained. No herniation. Normal facet joints. No  foraminal or spinal canal  stenosis.       L4-L5:  Disc height maintained. No herniation. Mild bilateral facet  arthropathy. No foraminal or spinal canal stenosis.       L5-S1:  Mild disc height loss. Disc bulge with uncovering. Severe  bilateral facet arthropathy with marrow edema suggestive of active  arthropathy. Multiple synovial cysts are present projecting off the  facet joints, including a 6 mm synovial cyst projecting anteriorly off  the left facet joint which contributes to moderate-to-severe left  foraminal stenosis. Mild right foraminal stenosis. Mild spinal canal  stenosis.                                                                   IMPRESSION:  Severe bilateral facet arthropathy at L5-S1 with synovial  cysts, grade 1 anterolisthesis, and bilateral foraminal stenosis, left  worse than right, related to anteriorly projecting synovial cyst.    Allergies:      Allergies   Allergen Reactions     Ibuprofen Itching     Monosodium Glutamate      MSG=sinus headache     Oxycodone         Vitals:  /62   Pulse 94   LMP 08/12/2012   SpO2 97%     Options/alternatives, benefits and risks were discussed with the patient including bleeding, infection, tissue trauma, exposure to radiation, reaction to medications including seizure, nerve injury, weakness, and numbness.  Questions were answered to her satisfaction and she agrees to proceed. Voluntary informed consent was obtained and signed.     Procedure:  After getting informed consent, patient was brought into the procedure suite and was placed in a prone position on the procedure table.   A Pause for the Cause was performed.  Patient was prepped and draped in sterile fashion.     After identifying the right SI joint, the C-arm was rotated to a obliquely to obtain the best view of the inferior angle of the joint.  A total of 2 ml of Lidocaine 1%  was used to anesthetize the skin at a skin entry site coaxial with the fluoroscopy beam at this location.  A 22 gauge 3.5 inch needle  was advanced under intermittent fluoroscopy until it was felt to enter the SI joint.    A total of 1 ml of Omnipaque-300 was injected, confirming appropriate position, with spread into the intraarticular space, with no intravascular uptake noted.  9 ml of Omnipaque-300 was wasted. Location was verified in lateral view.    2 ml of 0.5% bupivacaine with 40 mg of kenalog was injected.  The needle was removed. Hemostasis was achieved, the area was cleaned, and bandaids were placed when appropriate.  The patient tolerated the procedure well, and was taken to the recovery room.    Images were saved to PACS.    Pre-procedure pain score: 6/10  Post-procedure pain score: 0/10  Following today's procedure, the patient was advised to contact the Pain Management Center for any of the following:   Fever, chills, or night sweats   New onset of pain, numbness, or weakness   Any questions/concerns regarding the procedure    -f/u with the referring provider      LETICIA WARREN MD   Pain Management & Addiction Medicine

## 2021-12-02 ENCOUNTER — RADIOLOGY INJECTION OFFICE VISIT (OUTPATIENT)
Dept: PALLIATIVE MEDICINE | Facility: CLINIC | Age: 56
End: 2021-12-02
Payer: COMMERCIAL

## 2021-12-02 VITALS — OXYGEN SATURATION: 97 % | SYSTOLIC BLOOD PRESSURE: 120 MMHG | DIASTOLIC BLOOD PRESSURE: 82 MMHG | HEART RATE: 85 BPM

## 2021-12-02 DIAGNOSIS — M53.3 SACROILIAC JOINT PAIN: ICD-10-CM

## 2021-12-02 DIAGNOSIS — M46.1 SACROILIITIS (H): Primary | ICD-10-CM

## 2021-12-02 PROCEDURE — 27096 INJECT SACROILIAC JOINT: CPT | Mod: RT | Performed by: ANESTHESIOLOGY

## 2021-12-02 RX ORDER — TRIAMCINOLONE ACETONIDE 40 MG/ML
40 INJECTION, SUSPENSION INTRA-ARTICULAR; INTRAMUSCULAR ONCE
Status: COMPLETED | OUTPATIENT
Start: 2021-12-02 | End: 2021-12-02

## 2021-12-02 RX ADMIN — TRIAMCINOLONE ACETONIDE 40 MG: 40 INJECTION, SUSPENSION INTRA-ARTICULAR; INTRAMUSCULAR at 08:55

## 2021-12-02 NOTE — NURSING NOTE
Pre-procedure Intake  If YES to any questions or NO to having a   Please complete laminated checklist and leave on the computer keyboard for Provider, verbally inform provider if able.    For SCS Trial, RFA's or any sedation procedure:  Have you been fasting? NA    If yes, for how long?     Are you taking any any blood thinners such as Coumadin, Warfarin, Jantoven, Pradaxa Xarelto, Eliquis, Edoxaban, Enoxaparin, Lovenox, Heparin, Arixtra, Fondaparinux, or Fragmin? OR Antiplatelet medication such as Plavix, Brilinta, or Effient?   No     If yes, when did you take your last dose?     Do you take aspirin?  No    If cervical procedure, have you held aspirin for 6 days?   NA    Do you have any allergies to contrast dye, iodine, steroid and/or numbing medications?  NO    Are you currently taking antibiotics or have an active infection?  NO    Have you had a fever/elevated temperature within the past week? NO    Are you currently taking oral steroids? NO    Do you have a ? Yes    Are you pregnant or breastfeeding?  NO    Have you received the COVID-19 vaccine? Yes    If yes, was it your 1st, 2nd or only dose needed? Booster    Date of most recent vaccine: 15 days ago    Notify provider and RNs if systolic BP >170, diastolic BP >100, P >100 or O2 sats < 90%

## 2021-12-02 NOTE — NURSING NOTE
Discharge Information    IV Discontiued Time:  NA    Amount of Fluid Infused:  NA    Discharge Criteria = When patient returns to baseline or as per MD order    Consciousness:  Pt is fully awake    Circulation:  BP +/- 20% of pre-procedure level    Respiration:  Patient is able to breathe deeply    O2 Sat:  Patient is able to maintain O2 Sat >92% on room air    Activity:  Moves 4 extremities on command    Ambulation:  Patient is able to stand and walk or stand and pivot into wheelchair    Dressing:  Clean/dry or No Dressing    Notes:   Discharge instructions and AVS given to patient    Patient meets criteria for discharge?  YES    Admitted to PCU?  No    Responsible adult present to accompany patient home?  Yes    Signature/Title:    Brie Medley RN  RN Care Coordinator  [facility]

## 2021-12-02 NOTE — PATIENT INSTRUCTIONS
[facility] Procedure Discharge Instructions    Today you saw:   Dr. Snehal Nickerson     Your procedure:   Sacro-iliac joint injection        Medications used:  Lidocaine (anesthetic)  Bupivacaine (anesthetic)  Kenalog (steroid)  Omnipaque (contrast)            Be cautious when walking as numbness and/or weakness in the legs may occur up to 6-8 hours after the procedure due to effect of the local anesthetic    Do not drive for 6 hours. The effect of the local anesthetic could slow your reflexes.     Avoid strenuous activity for the first 24 hours. You may resume your regular activities after that.     You may shower, however avoid swimming, tub baths or hot tubs for 24 hours following your procedure    You may have a mild to moderate increase in pain for several days following the injection.      You may use ice packs for 10-15 minutes, 3 to 4 times a day at the injection site for comfort    Do not use heat to painful areas for 6 to 8 hours. This will give the local anesthetic time to wear off and prevent you from accidentally burning your skin.    Unless you have been directed to avoid the use of anti-inflammatory medications (NSAIDS-ibuprofen, Aleve, Motrin), you may use these medications or Tylenol for pain control if needed.     With diabetes, check your blood sugar more frequently than usual as your blood sugar may be higher than normal for 10-14 days following a steroid injection. Contact your doctor who manages your diabetes if your blood sugar is higher than usual    Possible side effects of steroids that you may experience include flushing, elevated blood pressure, increased appetite, mild headaches and restlessness.  All of these symptoms will get better with time.    It may take up to 14 days for the steroid medication to start working although you may feel the effect as early as a few days after the procedure.     Follow up with your referring provider in 2-3 weeks      If you experience  any of the following, call the pain center line during work hours at [phone] or on-call physician after hours at [phone]:  -Fever over 100 degree F  -Swelling, bleeding, redness, drainage, warmth at the injection site  -Progressive weakness or numbness in your legs   -Loss of bowel or bladder function  -Unusual new onset of pain that is not improving

## 2021-12-14 ENCOUNTER — MYC REFILL (OUTPATIENT)
Dept: PALLIATIVE MEDICINE | Facility: CLINIC | Age: 56
End: 2021-12-14
Payer: COMMERCIAL

## 2021-12-14 DIAGNOSIS — M79.18 MYOFASCIAL PAIN: ICD-10-CM

## 2021-12-14 DIAGNOSIS — G89.29 CHRONIC RIGHT SACROILIAC PAIN: ICD-10-CM

## 2021-12-14 DIAGNOSIS — M47.816 FACET ARTHROPATHY, LUMBAR: ICD-10-CM

## 2021-12-14 DIAGNOSIS — M53.3 CHRONIC RIGHT SACROILIAC PAIN: ICD-10-CM

## 2021-12-14 NOTE — TELEPHONE ENCOUNTER
Refills have been requested for the following medications:         cyclobenzaprine (FLEXERIL) 5 MG tablet [Raghav Miller MD]     Preferred pharmacy: [facility] #[id number] - [place], [place] - [address]

## 2021-12-15 RX ORDER — CYCLOBENZAPRINE HCL 5 MG
5-10 TABLET ORAL 3 TIMES DAILY PRN
Qty: 90 TABLET | Refills: 3 | Status: SHIPPED | OUTPATIENT
Start: 2021-12-15 | End: 2022-05-25

## 2021-12-15 NOTE — TELEPHONE ENCOUNTER
Received fax request from [facility]ACY #[id number] - [place], [place] - [address] requesting refill(s) for cyclobenzaprine (FLEXERIL) 5 MG tablet     Last refilled on 11/07/2021    Pt last seen on 12/02/2021   Next appt scheduled for NA    Will facilitate refill.    Marga Jones MA  [facility] [place]

## 2021-12-15 NOTE — TELEPHONE ENCOUNTER
[employer] message sent with Rx approval from provider.  Darrell  Pain Clinic Management Team

## 2022-04-10 ENCOUNTER — HEALTH MAINTENANCE LETTER (OUTPATIENT)
Age: 57
End: 2022-04-10

## 2022-04-16 ENCOUNTER — MYC MEDICAL ADVICE (OUTPATIENT)
Dept: PALLIATIVE MEDICINE | Facility: CLINIC | Age: 57
End: 2022-04-16

## 2022-04-17 ENCOUNTER — HOSPITAL ENCOUNTER (EMERGENCY)
Facility: CLINIC | Age: 57
Discharge: HOME | End: 2022-04-17
Attending: EMERGENCY MEDICINE | Admitting: EMERGENCY MEDICINE
Payer: COMMERCIAL

## 2022-04-17 VITALS
HEART RATE: 83 BPM | RESPIRATION RATE: 18 BRPM | BODY MASS INDEX: 34.64 KG/M2 | DIASTOLIC BLOOD PRESSURE: 89 MMHG | OXYGEN SATURATION: 97 % | SYSTOLIC BLOOD PRESSURE: 164 MMHG | WEIGHT: 205 LBS | TEMPERATURE: 98.1 F

## 2022-04-17 DIAGNOSIS — M54.17 LUMBOSACRAL RADICULOPATHY: ICD-10-CM

## 2022-04-17 PROCEDURE — 99284 EMERGENCY DEPT VISIT MOD MDM: CPT

## 2022-04-17 RX ORDER — TRAMADOL HYDROCHLORIDE 50 MG/1
50 TABLET ORAL EVERY 6 HOURS PRN
Qty: 4 TABLET | Refills: 0 | Status: SHIPPED | OUTPATIENT
Start: 2022-04-17 | End: 2022-04-20

## 2022-04-17 RX ORDER — METHYLPREDNISOLONE 4 MG/1
TABLET ORAL
Qty: 21 TABLET | Refills: 0 | Status: SHIPPED | OUTPATIENT
Start: 2022-04-17 | End: 2022-07-26

## 2022-04-17 ASSESSMENT — ENCOUNTER SYMPTOMS
ABDOMINAL PAIN: 0
BACK PAIN: 1
NUMBNESS: 1
FEVER: 0

## 2022-04-17 NOTE — ED TRIAGE NOTES
Patient has chronic low back pain, patient typically goes to the pain clinic but has been unable to reach them today.  Patient states today the pain is concentrated to her left hip and her left leg feels numb, patient denies bowel and bladder issues.

## 2022-04-17 NOTE — ED PROVIDER NOTES
History   Chief Complaint:  Back Pain       The history is provided by the patient.      Arabella Hart is a 56 year old female with history of chronic low back pain who presents with back pain. For the past two days, the patient has been having significant lower back discomfort as well as tingling and numbness in her left leg. Her pain has slowly progressed and she is now having difficulties walking or laying comfortably. Her numbness initially was localized to her toes but now is all the way down her leg. She has to lay on her stomach in order to alleviate her pain. She does typically go to a pain clinic but she was unable to reach them today but left a message. In the past, she has had sciatica but typically it has been localized to her buttocks. She denies any fever, chest pain, abdominal pain, saddle anesthesia, or bladder/bowel incontinence.       Review of Systems   Constitutional: Negative for fever.   Cardiovascular: Negative for chest pain.   Gastrointestinal: Negative for abdominal pain.   Genitourinary: Negative for enuresis.   Musculoskeletal: Positive for back pain.   Neurological: Positive for numbness.   All other systems reviewed and are negative.      Allergies:  Ibuprofen  Monosodium Glutamate  Oxycodone    Medications:  Flexeril  Allegra  Gabapentin   Levothyroxine   Naproxen   Effexor   Meloxicam    Past Medical History:     ASCUS on Pap smear   Allergic rhinitis   Kidney stones  Hypothyroidism   IBS  NAGI  Menopausal flushing   Tobacco use disorder       Past Surgical History:    Colonoscopy  Cholecystectomy  Hysterectomy, bilateral Salpingo-Oophorectomy  Tubal ligation   Right ankle surgery      Family History:    Mother: hypothyroidism, ovarian cancer  Father: hypertension, MI, throat cancer, CAD  Brother: CAD    Social History:  The patient presents to the ED with her . She is a nurse.       Physical Exam     Patient Vitals for the past 24 hrs:   BP Temp Temp src Pulse Resp SpO2  Weight   04/17/22 1745 (!) 164/89 -- -- 83 -- 97 % --   04/17/22 1646 (!) 147/90 98.1  F (36.7  C) Oral 87 18 99 % 93 kg (205 lb)   04/17/22 1645 (!) 147/90 -- -- -- -- -- --       Physical Exam  Vitals and nursing note reviewed.   Constitutional:       Appearance: Normal appearance.   HENT:      Head: Atraumatic.      Right Ear: External ear normal.      Left Ear: External ear normal.      Nose: Nose normal.      Mouth/Throat:      Mouth: Mucous membranes are moist.   Eyes:      Extraocular Movements: Extraocular movements intact.      Conjunctiva/sclera: Conjunctivae normal.   Cardiovascular:      Rate and Rhythm: Normal rate and regular rhythm.      Heart sounds: No murmur heard.  Pulmonary:      Effort: Pulmonary effort is normal. No respiratory distress.      Breath sounds: Normal breath sounds. No wheezing, rhonchi or rales.   Abdominal:      General: Abdomen is flat. Bowel sounds are normal. There is no distension.      Palpations: Abdomen is soft.      Tenderness: There is no abdominal tenderness. There is no guarding or rebound.   Musculoskeletal:         General: No deformity or signs of injury.      Cervical back: Normal range of motion and neck supple.      Lumbar back: Tenderness (left PSIS) present. No bony tenderness. Negative right straight leg raise test and negative left straight leg raise test.   Skin:     General: Skin is warm and dry.      Findings: No rash.   Neurological:      Mental Status: She is alert and oriented to person, place, and time.      Sensory: Sensory deficit (slight decreased sensation to LT in left foot) present.      Motor: No weakness.      Comments: Intact 5/5 strength thru BLE   Psychiatric:         Mood and Affect: Mood normal.         Behavior: Behavior normal.           Emergency Department Course     Emergency Department Course:             Reviewed:  I reviewed nursing notes, vitals and past medical history    Assessments:  1720 I obtained history and examined the  patient as noted above.     Disposition:  The patient was discharged to home.     Impression & Plan     CMS Diagnoses: None    Medical Decision Makin yo F with back pain rad down her left leg with paresthesias.  Sounds like LS radiculopathy.  No sig motor deficits or cord compression/cauda equina symptoms.  No red flags for malignancy or infection.  Will try a steroid taper and give a few tramadol (pt notes this has worked for her in past) and she will call her pain clinic tomorrow. Discussed return precautions.       Diagnosis:    ICD-10-CM    1. Lumbosacral radiculopathy  M54.17        Discharge Medications:  Discharge Medication List as of 2022  5:55 PM      START taking these medications    Details   methylPREDNISolone (MEDROL) 4 MG tablet Follow package directions and take tablets up to 4 times daily., Disp-21 tablet, R-0, [employer]      traMADol (ULTRAM) 50 MG tablet Take 1 tablet (50 mg) by mouth every 6 hours as needed for severe pain, Disp-4 tablet, R-0, [employer]             Scribe Disclosure:  I, Helen Coleman, am serving as a scribe on 2022 at 4:58 PM to personally document services performed by Will Ruggiero MD based on my observations and the provider's statements to me.            Will Ruggiero MD  22

## 2022-04-18 NOTE — TELEPHONE ENCOUNTER
Will leave encounter open for patient response/chart review by nursing.     FYI to provider      ----------------Mychart Below from pt----------------  Weston Montez!  I plan on calling the clinic on Monday. The last two days I have been waking up with horrible left hip pain and numbness (more like a tingly feeling) from my hip, buttocks and low back area to my toes).  What do I need to do before I come in and see you?     --------------Mychart below response to pt----------------  Sabrina Rahman,     Sorry to hear about your pain. I see that you went to the ER yesterday. Continue to take the steroid that they prescribed and if needed the Tramadol. It looks like you also have Flexeril that you can take up to three times a day as needed and also Mobic that you can take daily. These were written back in October when you saw Dr Miller while Dr Montez was on leave. You should call our scheduling office to get yourself into an appointment to follow up with Dr Montez to discuss next steps in your plan, [phone]. Thanks!    Brie FORTE, RN Care Coordinator  [facility]  Pain Management

## 2022-04-20 ENCOUNTER — OFFICE VISIT (OUTPATIENT)
Dept: PALLIATIVE MEDICINE | Facility: CLINIC | Age: 57
End: 2022-04-20
Payer: COMMERCIAL

## 2022-04-20 VITALS — OXYGEN SATURATION: 97 % | SYSTOLIC BLOOD PRESSURE: 159 MMHG | DIASTOLIC BLOOD PRESSURE: 97 MMHG | HEART RATE: 103 BPM

## 2022-04-20 DIAGNOSIS — M53.3 CHRONIC RIGHT SACROILIAC PAIN: ICD-10-CM

## 2022-04-20 DIAGNOSIS — G89.29 CHRONIC RIGHT SACROILIAC PAIN: ICD-10-CM

## 2022-04-20 DIAGNOSIS — M47.816 FACET ARTHROPATHY, LUMBAR: ICD-10-CM

## 2022-04-20 DIAGNOSIS — M54.16 LUMBAR RADICULOPATHY: Primary | ICD-10-CM

## 2022-04-20 PROCEDURE — 99214 OFFICE O/P EST MOD 30 MIN: CPT | Performed by: PHYSICAL MEDICINE & REHABILITATION

## 2022-04-20 ASSESSMENT — PAIN SCALES - GENERAL: PAINLEVEL: SEVERE PAIN (6)

## 2022-04-20 NOTE — PATIENT INSTRUCTIONS
Order placed to start physical therapy.   Consider a lumbar epidural steroid injection if symptoms worsen after completing the oral steroid.   We will discuss switching gabapentin to lyrica at the next visit  Follow up with me in 3-4 weeks or 2-3 weeks after the injection.  Caridad Montez MD  [facility]     ----------------------------------------------------------------  Clinic Number:  [phone]   Call with any questions about your care and for scheduling assistance.   Calls are returned Monday through Friday between 8 AM and 4:30 PM. We usually get back to you within 2 business days depending on the issue/request.    If we are prescribing your medications:  For opioid medication refills, call the clinic or send a [employer] message 7 days in advance.  Please include:  Name of requested medication  Name of the pharmacy.  For non-opioid medications, call your pharmacy directly to request a refill. Please allow 3-4 days to be processed.   Per [place] State Law:  All controlled substance prescriptions must be filled within 30 days of being written.    For those controlled substances allowing refills, pickup must occur within 30 days of last fill.      We believe regular attendance is key to your success in our program!    Any time you are unable to keep your appointment we ask that you call us at least 24 hours in advance to cancel.This will allow us to offer the appointment time to another patient.   Multiple missed appointments may lead to dismissal from the clinic.

## 2022-04-20 NOTE — LETTER
[facility] [address] [address]  [phone]      4/20/2022    To Whom It May Concern,     Arabella Hart was seen by me in clinic today for back pain. She is ok to return to work today, 4/20/2022.        Sincerely,         Caridad Montez MD

## 2022-04-20 NOTE — PROGRESS NOTES
[facility]    Date of visit: 4/20/2022      Assessment:  Arabella Hart is a 55 year old female with a past medical history significant for NAGI, IBS who presents with complaints of:     1. Left lumbar radiculitis: pain in left leg radiating into the posterolateral thigh with numbness felt in the toes. MRI of lumbar spine shows moderate to severe left foraminal stenosis at L5-S1 which seems to correlate with her symptoms. Pain is improving with oral steroids.     2. Chronic back pain: Chronic axial low back pain for last 2 years. No improvement with PT. Has associated stiffness in the mornings. Pain is aggravated with activity Etiology secondary to lumbar spondylosis, facet arthropathy. Lumbar RFA completed with significant improvement    3. . Chronic right buttock and hip pain: Symptoms ongoing for 3+ years. No improvement with PT or lumbar RFA for this pain. On exam, they have findings suggestive of sacroiliac joint dysfunction. Improved with SI joint injection.    Plan:  1. Therapies: Start physical therapy.    2. Diagnostic Studies:None  3. Medication Management:  No changes made today.   4. Procedures recommended:   1. None at this time but if symptoms worsen can do a lumbar PETER.   2. RFA can be repeated every 6+ months  3. Right sacroiliac joint injection can be repeated in future if needed  5. Follow up: 3-4 weeks or 2-3 weeks after injection is this is done.     Chief complaint:   Chief Complaint   Patient presents with     Pain       Interval history:  Arabella Hart is a 56 year old female last seen by Dr. Miller on 10/27/2021.      Since her last visit, Arabella Hart reports:  - She had a right SI joint injection on 12/2/2021 which is still helping.   - still getting benefit from RFA  - She has a newer pain which she wants to discuss. Thinks she overdid stairs. Felt numbness in the left toes and lower back numbness/tingling Saturday,  worse on Sunday. End of day went to ER. Tried to do stretching on the couch. Had more radiating pain down the left leg. No radiating pain before. Pain radiates into the thigh but nothing more distal. Pain is settling down now. On oral steroids. Leg feels little week. Numbness had improved. Has some pain still if sitting. localized more in buttock area.   - has 3 days of steroid left.   - tramadol helpful     Pain scores:  Pain intensity currently 6 on a scale of 0-10.     Medications:       Current pain medications:  Cyclobenzaprine 10 mg q hs prn and 5 mg BID prn - Just using at night. Not helpful for pain but helps relax at night.   Venlafaxine 37.5 +75 mg daily  Gabapentin 600 mg TID - [facility]  Tylenol 1,000 mg TID prn - using 1-2 times per day typically prior to bed - ?  Essential oils - [facility]          Other relevant medications:  NA     Current calculated MME: 0          Previous pain medications:  Aspercrembe, biofreeze, bengay,   CBD topical - takes edge off     Past pain treatments:  PT: Yes - twice since 2016. about a year ago - [facility]. She does stretches at home. Used to walk more in the past.   TENs Unit: Yes - [facility]  Injections:   Right SI joint injection - [facility]  Lumbar RFA- [facility]  Bilateral L5-S1 facet joint injections - [facility] for 2 months  Self-care:   Ice - [facility], heat - [facility]  Surgeries related to pain: None  Alternative Therapies:              Chiropractic: Yes - [facility]               Acupuncture: No  Other: None     Side Effects: no side effect    Medications:  Current Outpatient Medications   Medication Sig Dispense Refill     ACETAMINOPHEN PO Take 325-750 mg by mouth every 6 hours as needed       cyclobenzaprine (FLEXERIL) 5 MG tablet Take 1-2 tablets (5-10 mg) by mouth 3 times daily as needed for muscle spasms 90 tablet 3     Fexofenadine HCl (ALLEGRA ALLERGY PO)         gabapentin (NEURONTIN) 300 MG capsule Take 1 capsule (300 mg) by mouth At Bedtime Take with 600 mg capsule 90 capsule 3     gabapentin (NEURONTIN) 600 MG  tablet Take 1 tablet (600 mg) by mouth 3 times daily 270 tablet 3     levothyroxine (SYNTHROID/LEVOTHROID) 200 MCG tablet Take 1 tablet (200 mcg) by mouth daily 90 tablet 3     meloxicam (MOBIC) 15 MG tablet Take 1 tablet (15 mg) by mouth daily as needed for moderate pain 30 tablet 3     methylPREDNISolone (MEDROL) 4 MG tablet Follow package directions and take tablets up to 4 times daily. 21 tablet 0     venlafaxine (EFFEXOR-XR) 150 MG 24 hr capsule Take 1 capsule (150 mg) by mouth daily 90 capsule 3     naproxen (NAPROSYN) 500 MG tablet Take 1 tablet (500 mg) by mouth 2 times daily (with meals) (Patient not taking: Reported on 4/20/2022) 30 tablet 0     traMADol (ULTRAM) 50 MG tablet Take 1 tablet (50 mg) by mouth every 6 hours as needed for severe pain (Patient not taking: Reported on 4/20/2022) 4 tablet 0       Physical Exam:  Blood pressure (!) 159/97, pulse 103, last menstrual period 08/12/2012, SpO2 97 %, not currently breastfeeding.  General: NAD, pleasant  Gait: Normal  MSK exam: Lumbar ROM is mildly reduced in all planes. Strength is 5/5 and symmetric in the lower extremities.Sensation intact to light touch. Reflexes patellar bilaterally 2/4.      Caridad Montez MD  [facility] Management     BILLING TIME DOCUMENTATION:   The total TIME spent on this patient on the date of the encounter/appointment was 31 minutes.      TOTAL TIME includes:   Time spent preparing to see the patient (reviewing records and tests)  Time spent face to face (or over the phone) with the patient   Time spent ordering tests, medications, procedures and referrals   Time spent documenting clinical information in Epic

## 2022-04-21 DIAGNOSIS — M79.18 MYOFASCIAL PAIN: ICD-10-CM

## 2022-04-21 NOTE — TELEPHONE ENCOUNTER
Had appt yesterday 04/20/22. Closing    Brie FORTE, RN Care Coordinator  [facility]  Pain [facility]

## 2022-04-21 NOTE — TELEPHONE ENCOUNTER
Received fax request from [facility] pharmacy requesting refill(s) for meloxicam (MOBIC) 15 MG tablet    Last refilled on 03/09/22    Pt last seen on 10/27/21  Next appt scheduled for : none    Will facilitate refill.

## 2022-04-22 RX ORDER — MELOXICAM 15 MG/1
15 TABLET ORAL DAILY PRN
Qty: 30 TABLET | Refills: 3 | Status: SHIPPED | OUTPATIENT
Start: 2022-04-22 | End: 2023-01-31

## 2022-04-22 NOTE — TELEPHONE ENCOUNTER
Signed Prescriptions:                        Disp   Refills    meloxicam (MOBIC) 15 MG tablet             30 tab*3        Sig: Take 1 tablet (15 mg) by mouth daily as needed for           moderate pain  Authorizing Provider: GOLRIA DIAZ MD  [facility]

## 2022-05-23 ENCOUNTER — TELEPHONE (OUTPATIENT)
Dept: PALLIATIVE MEDICINE | Facility: CLINIC | Age: 57
End: 2022-05-23

## 2022-05-23 DIAGNOSIS — M54.16 LUMBAR RADICULOPATHY: Primary | ICD-10-CM

## 2022-05-23 NOTE — TELEPHONE ENCOUNTER
Outreach X1 per patient request. Left a  requesting call back. Provided call back number.    Patient would like to try the recommended LESI. Order pended. Please add level and laterality. Routing to Dr Motnez to review.     Instructions from 04/20/22:    1. Order placed to start physical therapy.   2. Consider a lumbar epidural steroid injection if symptoms worsen after completing the oral steroid.   3. We will discuss switching gabapentin to lyrica at the next visit  4. Follow up with me in 3-4 weeks or 2-3 weeks after the injection.    Caridad Montez MD  [facility]

## 2022-05-23 NOTE — TELEPHONE ENCOUNTER
Voice message left on 5/20/2022 at 4:17 PM    Reason for Call:  Other    Detailed comments: Patient would like to set up an epidural with Dr. Montez. Stated the doctor said that could be her next option.    Requests to be called back on Monday when the clinic opens.      Phone Number Patient can be reached at: Cell number on file:    Telephone Information:   Mobile [phone]       Best Time: not indicated    Can we leave a detailed message on this number? not indicated    Voice message retrieved on 5/23/2022 at 8:00 AM by Sabrina Kennedy

## 2022-05-24 ENCOUNTER — TELEPHONE (OUTPATIENT)
Dept: PALLIATIVE MEDICINE | Facility: CLINIC | Age: 57
End: 2022-05-24

## 2022-05-24 DIAGNOSIS — G89.29 CHRONIC RIGHT SACROILIAC PAIN: ICD-10-CM

## 2022-05-24 DIAGNOSIS — M79.18 MYOFASCIAL PAIN: ICD-10-CM

## 2022-05-24 DIAGNOSIS — M47.816 FACET ARTHROPATHY, LUMBAR: ICD-10-CM

## 2022-05-24 DIAGNOSIS — M53.3 CHRONIC RIGHT SACROILIAC PAIN: ICD-10-CM

## 2022-05-24 NOTE — TELEPHONE ENCOUNTER
Reason for call:  Medication   If this is a refill request, has the caller requested the refill from the pharmacy already? No  Will the patient be using a [facility]? No  Name of the pharmacy and phone number for the current request: [id number] - [place], [place] [address]    Name of the medication requested: cyclobenzaprine (FLEXERIL) 5 MG tablet    Other request:     Phone number to reach patient:  Cell number on file:    Telephone Information:   Mobile [phone]       Best Time:      Can we leave a detailed message on this number?  YES    Travel screening: Not Applicable

## 2022-05-24 NOTE — TELEPHONE ENCOUNTER
patient called returning a call from nursing       Briseida Peterson    [place] [facility] includes the [facility]

## 2022-05-24 NOTE — TELEPHONE ENCOUNTER
Screening Questions for Radiology Injections:    Injection to be done at which interventional clinic site? [facility]    Remind [place] Pt's that Covid test is no longer required except for sedation procedure Pt's.    Instruct patient to arrive as directed prior to the scheduled appointment time:    [place]: 30 minutes before; if IV needed 1 hour before     Procedure ordered by [facility]    Procedure ordered? Left L5-S1 Transforaminal epidural steroid injection       Transforaminal Cervical PETER -  [employer] does NOT have providers that do these- [facility] and [facility] do have providers that do    As a reminder, receiving steroids can decrease your body's ability to fight infection.   Would you still like to move forward with scheduling the injection?  Yes    What insurance would patient like us to bill for this procedure? [employer]       Worker's comp or MVA (motor vehicle accident) -Any injection DO NOT SCHEDULE and route to Giovanni Zapata.      [employer] insurance - For SI joint injections, DO NOT SCHEDULE and route Jennifer Flores.       ALL BCBS, [employer] and [employer] C[employer]-Route to Jennifer for review DO NOT SCHEDULE      IF SCHEDULING IN [place] AND NEEDS A PA, IT IS OKAY TO SCHEDULE. [place] HANDLES THEIR OWN PA'S AFTER THE PATIENT IS SCHEDULED. PLEASE SCHEDULE AT LEAST 1 WEEK OUT SO A PA CAN BE OBTAINED.    Any chance of pregnancy? NO   If YES, do NOT schedule and route to RN pool    Is an  needed? No     Patient has a drive home? (mandatory) YES: ok    Is patient taking any blood thinners (That is: Coumadin, Warfarin, Jantoven, Pradaxa Xarelto, Eliquis, Edoxaban, Enoxaparin, Lovenox, Heparin, Arixtra, Fondaparinux, or Fragmin? OR Antiplatelet medication such as Plavix, Brilinta, or Effient? )? No   If hold needed, do NOT schedule, route to RN pool     Is patient taking any aspirin products (includes Excedrin and Fiorinal)? No     If more than 325mg/day, OK to schedule; Instruct pt to  decrease to less than 325 mg for 7 days AND route to RN pool    For CERVICAL procedures, hold all aspirin products for 6 days.     Tell pt that if aspirin product is not held for 6 days, the procedure WILL BE cancelled.      Does the patient have a bleeding or clotting disorder? No     If YES, okay to schedule AND route to RN nurse pool    For any patients with platelet count <100, must be forwarded to provider    Any allergies to contrast dye, iodine, shellfish, or numbing and steroid medications? No    If YES, add allergy information to appointment notes AND route to the RN pool     If PETER and Contrast Dye / Iodine Allergy? DO NOT SCHEDULE, route to RN pool    Allergies: Ibuprofen, Monosodium glutamate, and Oxycodone     Is patient diabetic?  No  If YES, instruct them to bring their glucometer.    Does patient have an active infection or treated for one within the past week? No     Is patient currently taking any antibiotics?  No     For patients on chronic, preventative, or prophylactic antibiotics, procedures may be scheduled.     For patients on antibiotics for active or recent infection:antibiotic course must have been completed for 4 days    Is patient currently taking any steroid medications? (i.e. Prednisone, Medrol)  No     For patients on steroid medications, course must have been completed for 4 days    Is patient actively being treated for cancer or immunocompromised? No  If YES, do NOT schedule and route to RN pool     Are you able to get on and off an exam table with minimal or no assistance? Yes  If NO, do NOT schedule and route to RN pool    Are you able to roll over and lay on your stomach with minimal or no assistance? Yes  If NO, do NOT schedule and route to RN pool     Has the patient had a flu shot or any other vaccinations within 7 days before or after the procedure.  No     Have you recently had a COVID vaccine or have plans to get it in the near future? No    If yes, explain that for the  vaccine to work best they need to:       wait 1 week before and 1 week after getting Vaccine #1    wait 1 week before and 2 weeks after getting Vaccine #2    wait 1 week before and 2 weeks after getting Vaccine BOOSTER    If patient has concerns about the timing, send to RN pool     Does patient have an MRI/CT?  YES: MRI  Check Procedure Scheduling Grid to see if required.      Was the MRI done within the last 3 years?  Yes    If yes, [facility], [facility], [facility], [facility] etc? [facility]      If no, do not schedule and route to RN pool    If MRI was not done at [facility], [facility] or [facility] do NOT schedule and route to RN pool.      If pt has an imaging disc, the injection MAY be scheduled but pt has to bring disc to appt. (Except [place], no disc reading available)    If they show up without the disc the injection cannot be done    Procedure Specific Instructions:      If celiac plexus block, informed patient NPO for 6 hours and that it is okay to take medications with sips of water, especially blood pressure medications  Not Applicable         If this is for a cervical procedure, informed patient that aspirin needs to be held for 6 days.   Not Applicable      Sedation, If Sedation is ordered for any procedure, Pt must be NPO for 6 hours prior to procedure  Not Applicable      If IV needed:    Do not schedule procedures requiring IV placement in the first appointment of the day or first appointment after lunch. Do NOT schedule at 0745, 0815 or 1245. ok    Instructed pt to arrive 30 minutes early for IV start if required. (Check Procedure Scheduling Grid)  Not Applicable    Reminders:      If you are started on any steroids or antibiotics between now and your appointment, you must contact us because the procedure may need to be cancelled.  Yes      For all procedures except radiofrequency ablations (RFAs) and spinal cord stimulator (SCS) trials, informed  patient:    IV sedation is not provided for this procedure.  If you feel that an oral anti-anxiety medication is needed, you can discuss this further with your referring provider or primary care provider.  The Pain Clinic provider will discuss specifics of what the procedure includes at your appointment.  Most procedures last 10-20 minutes.  We use numbing medications to help with any discomfort during the procedure.  Not Applicable      For patients 85 or older we recommend having an adult stay w/ them for the remainder of the day.   ok    Does the patient have any questions?  NO  Claudia Zapata  [facility]

## 2022-05-24 NOTE — TELEPHONE ENCOUNTER
Received fax from pharmacy requesting refill(s) for cyclobenzaprine (FLEXERIL) 5 MG tablet     Last refilled on 04/23/22    Pt last seen on 04/20/22  Next appt scheduled for None    E-prescribe to:     [id number] - [place], [place] - [address]   [facility]     Will facilitate refill.      Quiana Real MA  [facility] [place]

## 2022-05-24 NOTE — TELEPHONE ENCOUNTER
Routing to  for prescreen and scheduling    Tonia ROJAS RN Care Coordinator  [facility]

## 2022-05-25 RX ORDER — CYCLOBENZAPRINE HCL 5 MG
5-10 TABLET ORAL 3 TIMES DAILY PRN
Qty: 90 TABLET | Refills: 3 | Status: SHIPPED | OUTPATIENT
Start: 2022-05-25 | End: 2023-03-21

## 2022-05-25 NOTE — TELEPHONE ENCOUNTER
Signed Prescriptions:                        Disp   Refills    cyclobenzaprine (FLEXERIL) 5 MG tablet     90 tab*3        Sig: Take 1-2 tablets (5-10 mg) by mouth 3 times daily as           needed for muscle spasms  Authorizing Provider: GLORIA DIAZ MD  [facility]

## 2022-05-27 ENCOUNTER — RADIOLOGY INJECTION OFFICE VISIT (OUTPATIENT)
Dept: PALLIATIVE MEDICINE | Facility: CLINIC | Age: 57
End: 2022-05-27
Attending: PHYSICAL MEDICINE & REHABILITATION
Payer: COMMERCIAL

## 2022-05-27 VITALS — HEART RATE: 98 BPM | OXYGEN SATURATION: 95 % | DIASTOLIC BLOOD PRESSURE: 86 MMHG | SYSTOLIC BLOOD PRESSURE: 146 MMHG

## 2022-05-27 DIAGNOSIS — M54.16 LUMBAR RADICULOPATHY: Primary | ICD-10-CM

## 2022-05-27 PROCEDURE — 64483 NJX AA&/STRD TFRM EPI L/S 1: CPT | Mod: LT | Performed by: PHYSICAL MEDICINE & REHABILITATION

## 2022-05-27 RX ORDER — DEXAMETHASONE SODIUM PHOSPHATE 10 MG/ML
10 INJECTION INTRAMUSCULAR; INTRAVENOUS ONCE
Status: COMPLETED | OUTPATIENT
Start: 2022-05-27 | End: 2022-05-27

## 2022-05-27 RX ADMIN — DEXAMETHASONE SODIUM PHOSPHATE 10 MG: 10 INJECTION INTRAMUSCULAR; INTRAVENOUS at 09:53

## 2022-05-27 ASSESSMENT — PAIN SCALES - GENERAL: PAINLEVEL: MODERATE PAIN (5)

## 2022-05-27 NOTE — PROGRESS NOTES
Pre-procedure Intake  If YES to any questions or NO to having a   Please complete laminated checklist and leave on the computer keyboard for Provider, verbally inform provider if able.    For SCS Trial, RFA's or any sedation procedure:  Have you been fasting? NA    If yes, for how long? -    Are you taking any any blood thinners such as Coumadin, Warfarin, Jantoven, Pradaxa Xarelto, Eliquis, Edoxaban, Enoxaparin, Lovenox, Heparin, Arixtra, Fondaparinux, or Fragmin? OR Antiplatelet medication such as Plavix, Brilinta, or Effient?   No     If yes, when did you take your last dose? na    Do you take aspirin?  No    If cervical procedure, have you held aspirin for 6 days?   NA    Do you have any allergies to contrast dye, iodine, steroid and/or numbing medications?  NO    Are you currently taking antibiotics or have an active infection?  NO    Have you had a fever/elevated temperature within the past week? NO    Are you currently taking oral steroids? NO    Do you have a ? Yes Rob     Are you pregnant or breastfeeding?  NO    Have you received the COVID-19 vaccine? Yes    If yes, was it your 1st, 2nd or only dose needed? yes    Date of most recent vaccine: December    Notify provider and RNs if systolic BP >170, diastolic BP >100, P >100 or O2 sats < 90%

## 2022-05-27 NOTE — PATIENT INSTRUCTIONS
[facility] Procedure Discharge Instructions    Today you saw:    Dr. Caridad Montez       Your procedure:  Epidural steroid injection       Medications used:  Lidocaine (anesthetic) Dexamethasone (steroid)  Omnipaque (contrast)          Be cautious when walking as numbness and/or weakness in the legs may occur up to 6-8 hours after the procedure due to effect of the local anesthetic  Do not drive for 6 hours. The effect of the local anesthetic could slow your reflexes.   Avoid strenuous activity for the first 24 hours. You may resume your regular activities after that.   You may shower, however avoid swimming, tub baths or hot tubs for 24 hours following your procedure  You may have a mild to moderate increase in pain for several days following the injection.    You may use ice packs for 10-15 minutes, 3 to 4 times a day at the injection site for comfort  Do not use heat to painful areas for 6 to 8 hours. This will give the local anesthetic time to wear off and prevent you from accidentally burning your skin.  Unless you have been directed to avoid the use of anti-inflammatory medications (NSAIDS-ibuprofen, Aleve, Motrin), you may use these medications or Tylenol for pain control if needed.   With diabetes, check your blood sugar more frequently than usual as your blood sugar may be higher than normal for 10-14 days following a steroid injection. Contact your doctor who manages your diabetes if your blood sugar is higher than usual  Possible side effects of steroids that you may experience include flushing, elevated blood pressure, increased appetite, mild headaches and restlessness.  All of these symptoms will get better with time.  It may take up to 14 days for the steroid medication to start working although you may feel the effect as early as a few days after the procedure.   Follow up with your referring provider in 2-3 weeks    If you experience any of the following, call the pain center line  during work hours at [phone] or on-call physician after hours at [phone]:  -Fever over 100 degree F  -Swelling, bleeding, redness, drainage, warmth at the injection site  -Progressive weakness or numbness in your legs   -Loss of bowel or bladder function  -Unusual headache that is not relieved by Tylenol or your regular headache medication  -Unusual new onset of pain that is not improving

## 2022-05-27 NOTE — PROGRESS NOTES
[facility] - Procedure Note    Date of Service: 5/27/2022    Procedure performed: Left L4-5 transforaminal epidural steroid injection with fluoroscopic guidance (Attempted L5-S1 but could not access epidural space)  Diagnosis: Lumbar spondylosis; Lumbar radiculitis/radiculopathy  : Caridad Montez MD   Anesthesia: None    Indications: Arabella Hart is a 57 year old female who is seen by me in clinic presents today for lumbar transforaminal epidural steroid injection. The patient describes low back pain with radiation into the left lower extremity. The patient has been exhibiting symptoms consistent with lumbar intraspinal inflammation and radiculopathy. Symptoms have been persistent, disabling, and intermittently severe. The patient reports minimal improvement with conservative treatment, including medications.    Lumbar MRI was done on 5/1/2021 which showed:   FINDINGS: For numbering purposes, L1 is considered to have small ribs,  normal variant. Alignment is significant for subtle grade 1  retrolisthesis of L4 on L5 and mild grade 1 anterolisthesis of L5 on  S1. Bone marrow demonstrates mild lower lumbar spine degenerative  endplate change. In addition, edema is present surrounding the  bilateral L5-S1 facet joints. Conus medullaris is unremarkable  terminating at the level of the L1-2 disc.     Multiple nonspecific T2 hyperintense renal lesions which are  incompletely characterized, statistically likely benign cysts. Tiny  area of T2 hyperintense signal within the right liver which is  incompletely characterized, statistically likely benign.     Segmental Analysis:      T12-L1:  Disc height maintained. No herniation. Normal facet joints.  No foraminal or spinal canal stenosis.      L1-L2:  Disc height maintained. No herniation. Normal facet joints. No  foraminal or spinal canal stenosis.       L2-L3:  Minimal disc height loss. No herniation. Normal facet joints.  No  foraminal or spinal canal stenosis.       L3-L4:  Disc height maintained. No herniation. Normal facet joints. No  foraminal or spinal canal stenosis.       L4-L5:  Disc height maintained. No herniation. Mild bilateral facet  arthropathy. No foraminal or spinal canal stenosis.       L5-S1:  Mild disc height loss. Disc bulge with uncovering. Severe  bilateral facet arthropathy with marrow edema suggestive of active  arthropathy. Multiple synovial cysts are present projecting off the  facet joints, including a 6 mm synovial cyst projecting anteriorly off  the left facet joint which contributes to moderate-to-severe left  foraminal stenosis. Mild right foraminal stenosis. Mild spinal canal  stenosis.                                                                      IMPRESSION:  Severe bilateral facet arthropathy at L5-S1 with synovial  cysts, grade 1 anterolisthesis, and bilateral foraminal stenosis, left  worse than right, related to anteriorly projecting synovial cyst.    Allergies:      Allergies   Allergen Reactions     Ibuprofen Itching     Monosodium Glutamate      MSG=sinus headache     Oxycodone         Vitals:  BP (!) 146/86   Pulse 98   LMP 08/12/2012   SpO2 95%     Review of Systems: The patient denies recent fever, chills, illness, use of antibiotics or anticoagulants. All other 10-point review of systems negative.     Procedure: The procedure and risks were explained, and informed written consent was obtained from the patient. Risks include but are not limited to: infection, bleeding, increased pain, and damage to soft tissue, nerve, muscle, and vasculature structures. After getting informed consent, patient was brought into the procedure suite and was placed in a prone position on the procedure table. A Pause for the Cause was performed. Patient was prepped and draped in sterile fashion.     After identifying the left L4-5 neuroforamen, the C-arm was rotated to a left lateral oblique angle.  A total  of 3 mL of Lidocaine 1% was used to anesthetize the skin and the needle track at a skin entry site coaxial with the fluoroscopy beam, and overriding the superior aspect of the neuroforamen.  A 22 gauge 5 inch spinal needle was advanced under intermittent fluoroscopy until it entered the foramen superiorly.    The position was then inspected from anteroposterior and lateral views, and the needle adjusted appropriately.  After negative aspiration, a total of 1 mL of Omnipaque-300 was injected using static and continuous fluoroscopy confirming appropriate position, with spread along the nerve root sheath and into the epidural space, with no intravascular or intrathecal uptake. 9 mL of Omnipaque-300 was wasted.    2 mL of 1% lidocaine with 10 mg of dexamethasone was injected.  The needle was removed. Hemostasis was achieved, the area was cleaned, and bandaids were placed when appropriate. Images were saved to PACS.    The patient tolerated the procedure well, and was taken to the recovery room, and there was no evidence of procedural complications. No new sensory or motor deficits were noted following the procedure. The patient was stable and able to ambulate on discharge home. Post-procedure instructions were provided.     Pre-procedure pain score: 5/10 in the back, 3/10 in the leg  Post-procedure pain score: 2/10 in the back, 0/10 in the leg    Assessment/Plan: Arabella Hart is a 57 year old female s/p left L4-5 transforaminal epidural steroid injection today for lumbar spondylosis, radiculitis/radiculopathy.     1. Following today's procedure, the patient was advised to contact the [facility] for any of the following:   Fever, chills, or night sweats   New onset of pain, numbness, or weakness   Any questions/concerns regarding the procedure  If unable to contact the Pain Center, the patient was instructed to go to a local Emergency Room for any complications.   2. The patient should  follow-up with me in 3 weeks for post-procedure evaluation.       Caridad Montez MD  [facility] [place]

## 2022-05-27 NOTE — NURSING NOTE
Discharge Information    IV Discontiued Time:  NA    Amount of Fluid Infused:  NA    Discharge Criteria = When patient returns to baseline or as per MD order    Consciousness:  Pt is fully awake    Circulation:  BP +/- 20% of pre-procedure level    Respiration:  Patient is able to breathe deeply    O2 Sat:  Patient is able to maintain O2 Sat >92% on room air    Activity:  Moves 4 extremities on command    Ambulation:  Patient is able to stand and walk or stand and pivot into wheelchair    Dressing:  Clean/dry or No Dressing    Notes:   Discharge instructions and AVS given to patient    Patient meets criteria for discharge?  YES    Admitted to PCU?  No    Responsible adult present to accompany patient home?  Yes    Signature/Title:    Brie Medley RN  RN Care Coordinator  [facility] [place]

## 2022-07-23 ENCOUNTER — LAB (OUTPATIENT)
Dept: LAB | Facility: CLINIC | Age: 57
End: 2022-07-23
Payer: COMMERCIAL

## 2022-07-23 DIAGNOSIS — E06.3 HYPOTHYROIDISM DUE TO HASHIMOTO'S THYROIDITIS: ICD-10-CM

## 2022-07-23 LAB
T4 FREE SERPL-MCNC: 1.19 NG/DL (ref 0.76–1.46)
TSH SERPL DL<=0.005 MIU/L-ACNC: <0.01 MU/L (ref 0.4–4)

## 2022-07-23 PROCEDURE — 84439 ASSAY OF FREE THYROXINE: CPT

## 2022-07-23 PROCEDURE — 84443 ASSAY THYROID STIM HORMONE: CPT

## 2022-07-23 PROCEDURE — 36415 COLL VENOUS BLD VENIPUNCTURE: CPT

## 2022-07-25 ENCOUNTER — MYC MEDICAL ADVICE (OUTPATIENT)
Dept: FAMILY MEDICINE | Facility: CLINIC | Age: 57
End: 2022-07-25

## 2022-07-25 DIAGNOSIS — E06.3 HYPOTHYROIDISM DUE TO HASHIMOTO'S THYROIDITIS: Primary | ICD-10-CM

## 2022-07-25 DIAGNOSIS — F41.1 GAD (GENERALIZED ANXIETY DISORDER): ICD-10-CM

## 2022-07-25 RX ORDER — LEVOTHYROXINE SODIUM 175 UG/1
TABLET ORAL
Qty: 102 TABLET | Refills: 3 | Status: SHIPPED | OUTPATIENT
Start: 2022-07-25 | End: 2023-07-24

## 2022-07-25 NOTE — TELEPHONE ENCOUNTER
Routing refill request to provider for review/approval because:  Labs out of range:  BP  Labs not current:  Last Cr from 2017    Creatinine   Date Value Ref Range Status   06/07/2017 0.67 0.52 - 1.04 mg/dL Final       BP Readings from Last 3 Encounters:   05/27/22 (!) 146/86   04/20/22 (!) 159/97   04/17/22 (!) 164/89     Morgan WISLON RN

## 2022-07-26 ENCOUNTER — OFFICE VISIT (OUTPATIENT)
Dept: PALLIATIVE MEDICINE | Facility: CLINIC | Age: 57
End: 2022-07-26
Payer: COMMERCIAL

## 2022-07-26 VITALS
HEART RATE: 92 BPM | BODY MASS INDEX: 36.69 KG/M2 | WEIGHT: 217.1 LBS | DIASTOLIC BLOOD PRESSURE: 78 MMHG | SYSTOLIC BLOOD PRESSURE: 133 MMHG | OXYGEN SATURATION: 97 %

## 2022-07-26 DIAGNOSIS — M79.18 MYOFASCIAL PAIN: ICD-10-CM

## 2022-07-26 DIAGNOSIS — M47.817 LUMBOSACRAL SPONDYLOSIS WITHOUT MYELOPATHY: Primary | ICD-10-CM

## 2022-07-26 DIAGNOSIS — M47.816 LUMBAR FACET ARTHROPATHY: ICD-10-CM

## 2022-07-26 PROCEDURE — 99213 OFFICE O/P EST LOW 20 MIN: CPT | Performed by: PHYSICAL MEDICINE & REHABILITATION

## 2022-07-26 RX ORDER — METHOCARBAMOL 500 MG/1
500 TABLET, FILM COATED ORAL 4 TIMES DAILY PRN
Qty: 120 TABLET | Refills: 0 | Status: SHIPPED | OUTPATIENT
Start: 2022-07-26 | End: 2022-08-16

## 2022-07-26 RX ORDER — VENLAFAXINE HYDROCHLORIDE 150 MG/1
150 CAPSULE, EXTENDED RELEASE ORAL DAILY
Qty: 90 CAPSULE | Refills: 0 | Status: SHIPPED | OUTPATIENT
Start: 2022-07-26 | End: 2022-10-18

## 2022-07-26 ASSESSMENT — PAIN SCALES - GENERAL: PAINLEVEL: SEVERE PAIN (7)

## 2022-07-26 NOTE — PATIENT INSTRUCTIONS
Stop flexeril. Start methocarbamol 500 mg up to four times daily as needed. Can be sedating.  Do not drive until you know how the medication affects you.   Order placed for repeat radiofrequency ablation. You will get called to schedule.   Continue utilizing topicals over the low back as needed.   Try using a TENS unit to see if that helps. You can also consider purchasing a shiatsu back and neck massager to see if that helps.   Follow up for injections.   Caridad Montez MD  [facility]     ----------------------------------------------------------------  Clinic Number:  [phone]   Call with any questions about your care and for scheduling assistance.   Calls are returned Monday through Friday between 8 AM and 4:30 PM. We usually get back to you within 2 business days depending on the issue/request.    If we are prescribing your medications:  For opioid medication refills, call the clinic or send a [employer] message 7 days in advance.  Please include:  Name of requested medication  Name of the pharmacy.  For non-opioid medications, call your pharmacy directly to request a refill. Please allow 3-4 days to be processed.   Per [place] State Law:  All controlled substance prescriptions must be filled within 30 days of being written.    For those controlled substances allowing refills, pickup must occur within 30 days of last fill.      We believe regular attendance is key to your success in our program!    Any time you are unable to keep your appointment we ask that you call us at least 24 hours in advance to cancel.This will allow us to offer the appointment time to another patient.   Multiple missed appointments may lead to dismissal from the clinic.

## 2022-07-26 NOTE — PROGRESS NOTES
[facility]    Date of visit: 7/26/2022    Assessment:  Arabella Hart is a 55 year old female with a past medical history significant for NAGI, IBS who presents with complaints of:     1. Left lumbar radiculitis: pain in left leg radiating into the posterolateral thigh with numbness felt in the toes. MRI of lumbar spine shows moderate to severe left foraminal stenosis at L5-S1 which seems to correlate with her symptoms. Pain is improved after lumber Transforaminal epidural steroid injection with ongoing improvement to date.     2. Chronic back pain: Chronic axial low back pain for over 2 years. No improvement with PT. Has associated stiffness in the mornings. Pain is aggravated with activity Etiology secondary to lumbar spondylosis, facet arthropathy. Lumbar RFA completed with significant improvement.    3. . Chronic right buttock and hip pain: Symptoms ongoing for 3+ years. No improvement with PT or lumbar RFA for this pain. On exam, they have findings suggestive of sacroiliac joint dysfunction. Improved with SI joint injection.    Plan:  1. Therapies: None at this time.    2. Diagnostic Studies:None  3. Medication Management:    1. stop cyclobenzaprine. Start methocarbamol 500 mg QID prn.   2. Continue utilizing topical medications  4. Procedures recommended:   1. Order placed for repeat lumbar RFA  2. Lumbar Transforaminal epidural steroid injection may be repeated in the future.  3. Right sacroiliac joint injection can be repeated in future if needed  5. Follow up: for RFA    Chief complaint:   Chief Complaint   Patient presents with     Pain     Interval history:  Arabella Hart is a 56 year old female last seen by me on 4/20/2022.      Since her last visit, Arabella Hart reports:  - she has return of pain in bilateral low back which refers into the bilateral hips.  - Epidural steroid injection very helpful for her leg pain  - can't find a  "comofortable positon to sleep at night.   - feels like SI joint injection still helpful.   - the worst position is kneeling and worse with getting up. More difficlt to walk. Feels \"tired\"  - RFA helped for 8-9 months. Got about 100 % relief from it.     Pain scores:  Pain intensity on average 5-6 on a scale of 0-10.     Medications:       Current pain medications:  Cyclobenzaprine 10 mg q hs prn and 5 mg BID prn - not as helpful  Venlafaxine 37.5 +75 mg daily  Gabapentin 600 mg BID and 900 q hs - [facility]  Tylenol 1,000 mg TID prn - [facility]  Essential oils - [facility]  meloxicam 15 mg daily prn - [facility]          Other relevant medications:  NA     Current calculated MME: 0          Previous pain medications:  Aspercrembe, biofreeze, bengay,   CBD topical - takes edge off     Past pain treatments:  PT: Yes - twice since 2016. about a year ago - [facility]. She does stretches at home. Used to walk more in the past.   TENs Unit: Yes - [facility]  Injections:   Left L4-5 Transforaminal epidural steroid injection - [facility]  Right SI joint injection - [facility]  Lumbar RFA- [facility]  Bilateral L5-S1 facet joint injections - [facility] for 2 months  Self-care:   Ice - [facility], heat - [facility]  Surgeries related to pain: None  Alternative Therapies:              Chiropractic: Yes - [facility]               Acupuncture: No  Other: None     Side Effects: no side effect    Medications:  Current Outpatient Medications   Medication Sig Dispense Refill     ACETAMINOPHEN PO Take 325-750 mg by mouth every 6 hours as needed       cyclobenzaprine (FLEXERIL) 5 MG tablet Take 1-2 tablets (5-10 mg) by mouth 3 times daily as needed for muscle spasms 90 tablet 3     Fexofenadine HCl (ALLEGRA ALLERGY PO)         gabapentin (NEURONTIN) 300 MG capsule Take 1 capsule (300 mg) by mouth At Bedtime Take with 600 mg capsule 90 capsule 3     gabapentin (NEURONTIN) 600 MG tablet Take 1 tablet (600 mg) by mouth 3 times daily 270 tablet 3     levothyroxine (SYNTHROID/LEVOTHROID) 175 MCG tablet Take 2 tablets one day of the week and " 1 tablet the rest of the days of the week 102 tablet 3     meloxicam (MOBIC) 15 MG tablet Take 1 tablet (15 mg) by mouth daily as needed for moderate pain 30 tablet 3     methylPREDNISolone (MEDROL) 4 MG tablet Follow package directions and take tablets up to 4 times daily. (Patient not taking: Reported on 5/27/2022) 21 tablet 0     naproxen (NAPROSYN) 500 MG tablet Take 1 tablet (500 mg) by mouth 2 times daily (with meals) 30 tablet 0     venlafaxine (EFFEXOR-XR) 150 MG 24 hr capsule Take 1 capsule (150 mg) by mouth daily 90 capsule 3       Physical Exam:  Blood pressure 133/78, pulse 92, weight 98.5 kg (217 lb 1.6 oz), last menstrual period 08/12/2012, SpO2 97 %, not currently breastfeeding.  General: NAD, pleasant  Gait: Normal  MSK exam: Lumbar ROM is mildly reduced in all planes. Pain with lumbar extension. Strength is 5/5 and symmetric in the lower extremities.      Caridad Montez MD  [facility]     BILLING TIME DOCUMENTATION:   The total TIME spent on this patient on the date of the encounter/appointment was 17 minutes.      TOTAL TIME includes:   Time spent preparing to see the patient (reviewing records and tests)  Time spent face to face (or over the phone) with the patient   Time spent ordering tests, medications, procedures and referrals   Time spent documenting clinical information in Epic

## 2022-08-01 ENCOUNTER — TELEPHONE (OUTPATIENT)
Dept: PALLIATIVE MEDICINE | Facility: CLINIC | Age: 57
End: 2022-08-01

## 2022-08-01 DIAGNOSIS — M54.16 LUMBAR RADICULOPATHY: Primary | ICD-10-CM

## 2022-08-01 NOTE — TELEPHONE ENCOUNTER
Per pt orders for RFA were to be placed for her. Please review and place orders if needed.    1. RFA can be repeated every 6+ months  2. Right sacroiliac joint injection can be repeated in future if needed            Giovanni Zapata    [facility] [place]

## 2022-08-01 NOTE — TELEPHONE ENCOUNTER
Routing to provider to review prepped order.     Per note 07/26/22:  1. Order placed for repeat radiofrequency ablation. You will get called to schedule.     Brie FORTE, RN Care Coordinator  [facility]  Pain [facility]

## 2022-08-04 DIAGNOSIS — M79.18 MYOFASCIAL PAIN: ICD-10-CM

## 2022-08-04 DIAGNOSIS — Z20.828 CONTACT WITH OR EXPOSURE TO VIRAL DISEASE: Primary | ICD-10-CM

## 2022-08-04 NOTE — TELEPHONE ENCOUNTER
Repeat Bilateral L3,4,5 medial branch radiofrequency ablation           Routing to [place] to review.      Giovanni Zapata    [facility] [place]

## 2022-08-05 NOTE — TELEPHONE ENCOUNTER
Faxed completed PA form and supporting documentation for repeat LRFA to PO.  Right fax confirmation          Jennifer CLIFFORD    [facility]

## 2022-08-05 NOTE — TELEPHONE ENCOUNTER
This is duplicate order request from 7/26, scheduling is coordinating already    Closing    Tonia ROJAS RN Care Coordinator  [facility]

## 2022-08-15 NOTE — TELEPHONE ENCOUNTER
PA approved.  Effective date: 8/5/22-8/4/23  PA reference #: [id number]  Pt. notified:   OKAY TO SCHEDULE LRFA WITH DR EMILY CLIFFORD    [facility]

## 2022-08-16 NOTE — TELEPHONE ENCOUNTER
Please make sure you arrive 30 minutes prior to your scheduled time to have the IV placed.      You will need to have a Covid test prior the appointment, it must be within 4 days of the appointment. The scheduling office should call you to make this appointment but if you do not receive a call, please call them at [phone].     You will need to reschedule appointment if been in contact with some who has been suspected or confirmed of covid-19. New or worsening symptoms of cough, fever, rash or shortness of breath.      You will need to fast for at least 6 hours prior to the appointment.     Reminded patient they will need a  for this appointment and requested that the  stays out of the clinic unless its medically necessary.     Reminded patient that both patient and  must wear a mask during their visit    Covid lab entered    Tonia ROJAS RN Care Coordinator  [facility]

## 2022-08-16 NOTE — TELEPHONE ENCOUNTER
Phoned pt to schedule Repeat Bilateral L3,4,5 medial branch radiofrequency ablation , she will call once home.      Giovanni Zapata    [facility] [place]

## 2022-08-16 NOTE — TELEPHONE ENCOUNTER
Pt requested robaxin refill while reviewing covid/RFA info    Routing to provider to sign orders    Tonia ROJAS RN Care Coordinator  [facility]

## 2022-08-16 NOTE — TELEPHONE ENCOUNTER
Screening Questions for RFA Procedure      Procedure ordered? LRFA WITH DR DIAZ  What insurance are we billing for this procedure?  [employer]   IF SCHEDULING IN [place], IT IS OKAY TO SCHEDULE. [place] HANDLES THEIR OWN PA'S AFTER THE PATIENT IS SCHEDULED. PLEASE SCHEDULE AT LEAST 1 WEEK OUT SO A PA CAN BE OBTAINED.    Has patient had this injection before? Yes:   This procedure requires that a COVID-19 lab test be done; Either a PCR test from [facility] or a Rapid Home Antigen test.  What test would Pt like to do? PCR      If PCR test from [facility] - Pt must have within 4 days of procedure, let patient know that someone will call them to schedule the COVID-19 test and that they will only receive a call back if the result is positive. Route to nursing to enter order.     If Rapid Home Antigen Test - Pt must have within 2 days of procedure and bring a picture of the negative test with them to the appointment.  Any chance of pregnancy? Not Applicable   If YES, do NOT schedule and route to RN pool     Is  Needed?: No  Will patient have a ?  Yes   If pt is given sedation meds, no driving for 24 hours.  Is pt taking a cab or transportation service? NO        If so will need to be accompanied by an adult too (friend/family member) in order for IV sedation to be given.      Per [place] Policy:  Outpatients are to have responsible adult or family member to accompany them at discharge and drive them home. A service providing medically trained drivers or attendants would be acceptable. Public transportation would not be acceptable unless the patient is accompanied by a responsible adult or family member.  Is patient taking any blood thinners (i.e. plavix, coumadin, jantoven, warfarin, heparin, pradaxa or dabigatran, etc)? No   If YES, do NOT schedule, and route to RN pool    Is patient taking any aspirin products? No     If more than 325mg/day, OK to schedule; Instruct pt to decrease to  less than 325 mg for 7 days AND route to RN pool    For CERVICAL procedures, hold all aspirin products for 6 days.     Tell pt that if aspirin product is not held for 6 days, the procedure WILL BE cancelled.      Does the patient have a bleeding or clotting disorder? No     If YES, it it OKAY to schedule AND route to RN pool    **For any patients with platelet count <100, must be forwarded to provider**    Is patient diabetic? No If YES, have them bring their glucometer.    Does patient have an active infection or treated for one within the past week? No   If YES, do NOT schedule and route to RN nurse pool     Is patient currently taking any antibiotics?  No    For patients on chronic, preventative, or prophylactic antibiotics, procedures may be scheduled.     For patients on antibiotics for active or recent infection:antibiotic course must have been completed for 4 days    Is patient currently taking any steroid medications? (i.e. Prednisone, Medrol)  No     For patients on steroid medications, course must have been completed for 4 days    Is patient actively being treated for cancer or immunocompromised, including the spleen having been removed? No  If YES, do NOT schedule and route to RN pool     Any history of complications with sedation medications?  NO   If YES, OK to schedule AND route to RN pool     Any history of sleep apnea?  NO   If YES, OK to schedule AND route to RN pool     Any cardiac history?  NO   If YES, OK to schedule AND route to RN pool     Do you have an implanted pacemaker, ICD (implanted cardiac device) or AICD (automatic implanted cardiac device)?  NO    If YES, do NOT schedule AND route to RN pool.     Obtain name of device :       Obtain name of cardiologist:       Do you have an implanted stimulator?  NO    If YES, OK to schedule AND route to nursing.     Instruct patient to bring in the remote to the appointment and it will need to be turned off.  reviewed and       Does  patient have an allergy to contrast dye, iodine or shellfish?  No   If YES, OK to schedule. Route to RN pool AND add allergy information to appointment notes    Are you able to get on and off an exam table with minimal or no assistance? Yes   If NO, do NOT schedule and route to RN pool    Are you able to roll over and lay on your stomach with minimal or no assistance? Yes   If NO, do NOT schedule and route to RN pool    Reminders:    If you are started on any steroids or antibiotics between now and your appointment, you must contact us because it may affect our ability to perform your procedure.  Yes    Informed patient that s/he needs to fast for 6 hours before procedure?  YES    Informed patient that it is OK to take normal medications with sips of water, especially blood pressure medications, before the procedure and must hold blood thinners as instructed.  Yes    Informed patient to arrive 30 minutes before procedure time to have an IV inserted.  reviewed   Do NOT schedule at 0745, 0815 or 1245.  reviewed     All radiofrequency ablations are in a 90 minute time slot.  reviewed

## 2022-08-17 RX ORDER — METHOCARBAMOL 500 MG/1
500 TABLET, FILM COATED ORAL 4 TIMES DAILY PRN
Qty: 120 TABLET | Refills: 1 | Status: SHIPPED | OUTPATIENT
Start: 2022-08-17 | End: 2023-03-21

## 2022-08-17 NOTE — TELEPHONE ENCOUNTER
Resent covid order    Closing      Tonia ROJAS RN Care Coordinator  [facility]

## 2022-09-09 ENCOUNTER — LAB (OUTPATIENT)
Dept: LAB | Facility: CLINIC | Age: 57
End: 2022-09-09
Attending: PHYSICAL MEDICINE & REHABILITATION

## 2022-09-09 DIAGNOSIS — Z20.828 CONTACT WITH OR EXPOSURE TO VIRAL DISEASE: ICD-10-CM

## 2022-09-09 PROCEDURE — U0005 INFEC AGEN DETEC AMPLI PROBE: HCPCS

## 2022-09-09 PROCEDURE — U0003 INFECTIOUS AGENT DETECTION BY NUCLEIC ACID (DNA OR RNA); SEVERE ACUTE RESPIRATORY SYNDROME CORONAVIRUS 2 (SARS-COV-2) (CORONAVIRUS DISEASE [COVID-19]), AMPLIFIED PROBE TECHNIQUE, MAKING USE OF HIGH THROUGHPUT TECHNOLOGIES AS DESCRIBED BY CMS-2020-01-R: HCPCS

## 2022-09-10 LAB — SARS-COV-2 RNA RESP QL NAA+PROBE: NEGATIVE

## 2022-09-12 ENCOUNTER — TELEPHONE (OUTPATIENT)
Dept: PALLIATIVE MEDICINE | Facility: CLINIC | Age: 57
End: 2022-09-12

## 2022-09-12 NOTE — TELEPHONE ENCOUNTER
Spoke to patient, reminded patient of date, time (arrive 30 minutes early) and location of appointment.     Asked patient if they have received anti-biotics or vaccines in the past 7 days?     Reminded patient to fast for at least 6 hours.    Remained to hold any blood thinners or pain medications that they were asked to hold per nurse.     Reminded patient they will need a  for this appointment.      Reminded patient that both patient and  must wear a mask during their visit.        Quiana Real MA  [facility] [place]

## 2022-09-13 ENCOUNTER — RADIOLOGY INJECTION OFFICE VISIT (OUTPATIENT)
Dept: PALLIATIVE MEDICINE | Facility: CLINIC | Age: 57
End: 2022-09-13
Payer: COMMERCIAL

## 2022-09-13 VITALS
RESPIRATION RATE: 12 BRPM | SYSTOLIC BLOOD PRESSURE: 128 MMHG | HEART RATE: 83 BPM | DIASTOLIC BLOOD PRESSURE: 76 MMHG | OXYGEN SATURATION: 92 %

## 2022-09-13 DIAGNOSIS — M47.896 OTHER SPONDYLOSIS, LUMBAR REGION: Primary | ICD-10-CM

## 2022-09-13 DIAGNOSIS — G89.18 DISCOMFORT RELATED TO PROCEDURE: ICD-10-CM

## 2022-09-13 PROCEDURE — 64635 DESTROY LUMB/SAC FACET JNT: CPT | Mod: 50 | Performed by: PHYSICAL MEDICINE & REHABILITATION

## 2022-09-13 RX ORDER — FENTANYL CITRATE 50 UG/ML
50 INJECTION, SOLUTION INTRAMUSCULAR; INTRAVENOUS EVERY 5 MIN PRN
Status: DISCONTINUED | OUTPATIENT
Start: 2022-09-13 | End: 2022-10-27

## 2022-09-13 RX ADMIN — Medication 250 ML: at 10:34

## 2022-09-13 RX ADMIN — FENTANYL CITRATE 50 MCG: 50 INJECTION, SOLUTION INTRAMUSCULAR; INTRAVENOUS at 10:29

## 2022-09-13 NOTE — NURSING NOTE
Discharge Information    IV Discontiued Time:  1125    Amount of Fluid Infused:  450    Discharge Criteria = When patient returns to baseline or as per MD order    Consciousness:  Pt is fully awake    Circulation:  BP +/- 20% of pre-procedure level    Respiration:  Patient is able to breathe deeply    O2 Sat:  Patient is able to maintain O2 Sat >92% on room air    Activity:  Moves 4 extremities on command    Ambulation:  Patient is able to stand and walk or stand and pivot into wheelchair    Dressing:  Clean/dry or No Dressing    Notes:   Discharge instructions and AVS given to patient    Patient meets criteria for discharge?  YES    Admitted to PCU?  No    Responsible adult present to accompany patient home?  Yes    Signature/Title:    Tonia Lyn RN  RN Care Coordinator  [facility] [place]

## 2022-09-13 NOTE — NURSING NOTE
Pre-procedure Intake  If YES to any questions or NO to having a   Please complete laminated checklist and leave on the computer keyboard for Provider, verbally inform provider if able.    For SCS Trial, RFA's or any sedation procedure:  Have you been fasting? Yes    If yes, for how long? 6 hours    Are you taking any any blood thinners such as Coumadin, Warfarin, Jantoven, Pradaxa Xarelto, Eliquis, Edoxaban, Enoxaparin, Lovenox, Heparin, Arixtra, Fondaparinux, or Fragmin? OR Antiplatelet medication such as Plavix, Brilinta, or Effient?   No     If yes, when did you take your last dose?     Do you take aspirin?  No    If cervical procedure, have you held aspirin for 6 days?   NA    Do you have any allergies to contrast dye, iodine, steroid and/or numbing medications?  NO    Are you currently taking antibiotics or have an active infection?  NO    Have you had a fever/elevated temperature within the past week? NO    Are you currently taking oral steroids? NO    Do you have a ? Yes    Are you pregnant or breastfeeding?  Not Applicable    Have you received the COVID-19 vaccine? Yes    If yes, was it your 1st, 2nd or only dose needed? 3rd    Date of most recent vaccine: 11/16/21    Vitals: B/P 141/82    Notify provider and RNs if systolic BP >170, diastolic BP >100, P >100 or O2 sats < 90%      Quiana Real MA  [facility]

## 2022-09-13 NOTE — PATIENT INSTRUCTIONS
[facility] Procedure Discharge Instructions       Today you saw:  Dr. Caridad Montez    Your procedure:  Radiofrequency Nerve Ablation     Procedural Medications:  Lidocaine (anesthetic)       Sedation medications:  Fentanyl - pain     You have received sedation during your procedure; for the next 24 hours you should not:   -Drive   -Operate machinery   -Drink alcohol   -Sign any legal documents     You may resume your normal diet and medications.   Avoid strenuous activity for the first 24 hours and resume regular activities after that.   Be cautious with walking as numbness and/or weakness in the lower extremities up to 6-8 hours may occur due to effect of local anesthetic   You may shower, however no swimming or tub baths or hot tubs for 24 hours following your procedure   Anticipate pain for up to 2 weeks after this procedure.  You may use ice packs 10-15 minutes three to four times a day at the injection site for comfort   You may use anti-inflammatory medications (such as Ibuprofen or Aleve or Advil) or Tylenol for pain control if necessary         It may take up to 8 weeks to receive relief from the RFA    If you experience any of the following, call the pain center line during work hours at [phone] or on call physician after hours at [phone]:  -Fever over 100 degree F   -Swelling, bleeding, redness, drainage, warmth at the injection site   -Progressive weakness or numbness in your legs or arms   -Loss of bowel or bladder function   -Unusual new onset of pain that is not improving

## 2022-09-13 NOTE — PROGRESS NOTES
MD Time IN: 1028   Sedation start time:  1029  Sedation end time:  1059    Medications given: fentanyl 50 mcg IV; versed 0 mg IV; toradol 0 mg IV  Intravenous fluids were administered, normal saline 150 cc's.  Sedation Level Achieved:  Moderate (conscious) sedation    Tonia ROJAS RN Care Coordinator  [facility]

## 2022-09-13 NOTE — PROCEDURES
[facility] - Procedure Note    Date of Visit: 9/13/2022    Pre procedure Diagnosis: Lumbosacral spondylosis; facet arthropathy   Post procedure Diagnosis: Same  Procedure performed: Bilateral L4,5 medial branch Lumbar radiofrequency ablation   Anesthesia: 50 mcg fentanyl  Complications: None  Operators: Caridad Montez MD    Indications:   Arabella Hart is a 57 year old female seen by me in clinic presents today for lumbar RFA.  They have a history of chronic bilateral low back pain.  Exam shows increased discomfort with extension and rotation and they have tried conservative treatment including oral medications and exercises.    Last RFA completed on 9/2/2021.    MRI was done on 5/1/2021 which showed   FINDINGS: For numbering purposes, L1 is considered to have small ribs,  normal variant. Alignment is significant for subtle grade 1  retrolisthesis of L4 on L5 and mild grade 1 anterolisthesis of L5 on  S1. Bone marrow demonstrates mild lower lumbar spine degenerative  endplate change. In addition, edema is present surrounding the  bilateral L5-S1 facet joints. Conus medullaris is unremarkable  terminating at the level of the L1-2 disc.     Multiple nonspecific T2 hyperintense renal lesions which are  incompletely characterized, statistically likely benign cysts. Tiny  area of T2 hyperintense signal within the right liver which is  incompletely characterized, statistically likely benign.     Segmental Analysis:      T12-L1:  Disc height maintained. No herniation. Normal facet joints.  No foraminal or spinal canal stenosis.      L1-L2:  Disc height maintained. No herniation. Normal facet joints. No  foraminal or spinal canal stenosis.       L2-L3:  Minimal disc height loss. No herniation. Normal facet joints.  No foraminal or spinal canal stenosis.       L3-L4:  Disc height maintained. No herniation. Normal facet joints. No  foraminal or spinal canal stenosis.       L4-L5:  Disc  height maintained. No herniation. Mild bilateral facet  arthropathy. No foraminal or spinal canal stenosis.       L5-S1:  Mild disc height loss. Disc bulge with uncovering. Severe  bilateral facet arthropathy with marrow edema suggestive of active  arthropathy. Multiple synovial cysts are present projecting off the  facet joints, including a 6 mm synovial cyst projecting anteriorly off  the left facet joint which contributes to moderate-to-severe left  foraminal stenosis. Mild right foraminal stenosis. Mild spinal canal  stenosis.                                                                      IMPRESSION:  Severe bilateral facet arthropathy at L5-S1 with synovial  cysts, grade 1 anterolisthesis, and bilateral foraminal stenosis, left  worse than right, related to anteriorly projecting synovial cyst.    Allergies:      Allergies   Allergen Reactions     Ibuprofen Itching     Monosodium Glutamate      MSG=sinus headache     Oxycodone         Vitals:  BP 91/56 (BP Location: Left arm)   Pulse 73   Resp 14   LMP 08/12/2012   SpO2 95%     Review of Systems: The patient denies recent fever, chills, illness, use of antibiotics or anticoagulants. All other 10-point review of systems negative.     Options/alternatives, benefits and risks were discussed with the patient including bleeding, infection, no pain relief, tissue trauma, exposure to radiation, reaction to medications including seizure, spinal cord injury,increased pain after the procedure, weakness, numbness or sensory changes and headache.   We also discussed risks of sedation, including reaction to medications and cardiovascular collapse.    Questions were answered to her satisfaction and she agrees to proceed. Voluntary informed consent was obtained and signed.     Medications were reviewed:  Yes   Pre-procedure pain score: 5/10    Procedure:  After getting informed consent, patient was brought into the procedure suite and was placed in a prone position  on the procedure table.   A Pause for the Cause was performed.  Patient was prepped and draped in sterile fashion.     Arabella Hart had an IV line placed prior the procedure.  The C-arm was positioned in the Right oblique view to afford optimal view of the L5 vertebral body. Lidocaine 1% was used to anesthetize the skin at each level.  At each level, a 100mm, 20G curved radiofrequency cannula with a 10mm active tip was positioned, overlying the intersection of the transverse process and pedicle at L5 and was advanced under intermittent fluoroscopy until it contacted the transverse process and notch, and the tip slightly overran that process, just lateral to the mamillary process.  The position of each cannula was verified and optimized in the oblique view and AP views.    In the AP view, another cannula was placed at the sacral alar notch.      The exact procedure was repeated on the left.    Each position was tested for motor and sensory stimulation, and was positioned so that stimulation was negative for stimuli outside the immediate area of the desired lesion.  Sensory stimulation was completed at 50 Hz, with max stimulation up to 0.8V.  Motor stimulation was completed at 2Hz, up to 2.0V.  Lidocaine 2% 0.9 ml was injected at each level, and a 90 second, 80 degree Centigrade lesion was generated.    The needles were then rotated within the pathway of the medial branch, and locations were evaluated with repeat imaging.  Motor testing was again completed, and showed appropriate stimulation.  A second lesion was then generated at each location.    The needles were withdrawn. Hemostasis was achieved, the area was cleaned, and bandaids were placed when appropriate.  The patient tolerated the procedure well, and was taken to the recovery room.    Images were saved to PACS.      Post-procedure pain score: 0/10    Assessment/Plan: Arabella Hart is a 56 year old female s/p Bilateral lumbar RFA for chronic low  back pain.     1. Following today's procedure, the patient was advised to contact the [facility] for any of the following:   Fever, chills, or night sweats   New onset of pain, numbness, or weakness   Any questions/concerns regarding the procedure  If unable to contact the Pain Center, the patient was instructed to go to a local Emergency Room for any complications.     2. The patient should follow-up with me in clinic for post-procedure evaluation in 4-6 weeks.    Caridad Montez MD  [facility]

## 2022-09-24 ENCOUNTER — E-VISIT (OUTPATIENT)
Dept: URGENT CARE | Facility: CLINIC | Age: 57
End: 2022-09-24
Payer: COMMERCIAL

## 2022-09-24 DIAGNOSIS — J01.90 ACUTE SINUSITIS, RECURRENCE NOT SPECIFIED, UNSPECIFIED LOCATION: Primary | ICD-10-CM

## 2022-09-24 PROCEDURE — 99421 OL DIG E/M SVC 5-10 MIN: CPT | Performed by: NURSE PRACTITIONER

## 2022-09-24 NOTE — PATIENT INSTRUCTIONS
Hi Josiah  We don't treat ear infections virtually  If you think it's infected please go into urgent care for evaluation and treatment. Also it is too early to treat you for sinusitis.  See information below     The symptoms you describe suggest a viral cause, which is much more common than a bacterial cause. Antibiotics will treat bacterial infections, but have no effect on viral infections. If possible, especially if improving, start with symptom care for the first 7-10 days, then consider seeking further treatment or taking an antibiotic. Bacterial infections generally are more severe, including symptoms such as pus, fever over 101degrees F, or rapidly worsening.    When to Use Antibiotics    Antibiotics are medicines used to treat infections caused by bacteria. They don t work for an illness caused by a virus. And they don't work for an allergic reaction. In fact, taking antibiotics for reasons other than an infection by bacteria can cause problems. You may have side effects from the medicine. And if you need an antibiotic in the future, it may not work well. This is because the bacteria can become immune to the medicine. You can also get a type of diarrhea that's hard to treat. This diarrhea is called C. diff.   When antibiotics likely won t help  Your healthcare provider won t usually give you antibiotics for the conditions listed below. You can help by not asking for them if you have:   A cold. This type of illness is caused by a virus. It can cause a runny nose, stuffed-up nose, sneezing, coughing, and headache. You may also have mild body aches and low fever. A cold gets better on its own in a few days to a week.  The flu (influenza). This is a respiratory illness caused by a virus. The flu usually goes away on its own in a week or so. It can cause fever, body aches, sore throat, and tiredness.  Bronchitis. This is an infection in the lungs. It is most often caused by a virus. You may have coughing, phlegm,  body aches, and a low fever. A common type of bronchitis is known as a chest cold. This is called acute bronchitis. This often happens after you have a respiratory infection like a cold. Bronchitis can take weeks to go away. Antibiotics often don t help.  Most sore throats. Sore throats are most often caused by viruses. Your throat may feel scratchy or achy. It may hurt to swallow. You may also have a low fever and body aches. A sore throat usually gets better in a few days.  Most outer ear infections. An ear infection may be caused by a virus or bacteria. It causes pain in the ear. Antibiotics by mouth usually don t help. Low-dose antibiotic ear drops work much better.  Some inner ear infections. An inner ear infection (otitis media) can be caused by a virus in the ear. It can also cause pain and a high fever. Most older children with low-grade fever don't need to be treated with antibiotics.  Most sinus infections. This is also known as sinusitis. This kind of infection causes sinus pain and swelling, and a runny nose. In most cases, it goes away on its own. Antibiotics don t make recovery quicker.  Allergic rhinitis. This is a set of symptoms caused by an allergic reaction. You may have sneezing, a runny nose, itchy or watery eyes, or a sore throat. Allergies are not treated with antibiotics.  Low fever. A mild fever that s less than 100.4 F (38 C) most likely doesn t need to be treated with antibiotics.   When antibiotics can help  Antibiotics can be used to treat:                                                     Strep throat. This is a throat infection caused by a certain type of bacteria. Symptoms of strep throat include a sore throat, white patches on the tonsils, red spots on the roof of the mouth, fever, body aches, and nausea and vomiting. Strep throat almost never causes a cough.  Urinary tract infection (UTI). This is an infection of the bladder and the tube that takes urine out of the body. It is  caused by bacteria. It can cause burning pain and urine that s cloudy or tinted with blood. UTIs are very common. Antibiotics usually help treat them.  Some outer ear infections. In some cases, a healthcare provider may prescribe antibiotics by mouth for an ear infection. You may need a test to show the cause of the ear infection.  Some sinus infections. In some cases, your healthcare provider may give you antibiotics. He or she may first need to make sure your symptoms aren t caused by something else. This may be a virus, fungus, allergies, or air pollutants such as smoke.   Your healthcare provider may give you antibiotics if you have a condition that can affect your immune system. This includes diabetes or cancer.  Self-care at home  If your infection can t be treated with antibiotics, you can take other steps to feel better. Try the remedies below. In general:   Rest and sleep as much as needed.  Drink water and other clear fluids.  Don t smoke. Stay away from smoke from other people.  Use over-the-counter medicine such as acetaminophen or ibuprofen to ease pain or fever, as directed by your healthcare provider.  To treat sinus pain or nasal stuffiness:  Put a warm, moist cloth on your face where you feel sinus pain or pressure.  Try a nasal spray with medicine or saline. Use as directed by your healthcare provider.  Breathe in steam from a hot shower.  Use a humidifier or cool mist vaporizer.   To quiet a cough:   Use a humidifier or cool mist vaporizer.  Breathe in steam from a hot shower.  Suck on cough lozenges.   To sooth a sore throat:   Suck on ice chips, frozen ice pops, or lozenges.  Use a sore throat spray.  Use a humidifier or cool mist vaporizer.  Gargle with saltwater.  Drink warm liquids.  Take ibuprofen to reduce swelling and pain.  To ease ear pain:   Hold a warm, moist washcloth on the ear for 10 minutes at a time.  Chao last reviewed this educational content on 12/1/2019 2000-2021 [employer]  [employer]. All rights reserved. This information is not intended as a substitute for professional medical care. Always follow your healthcare professional's instructions.        Dear Arabella Hart    After reviewing your responses, I've been able to diagnose you with?a sinus infection caused by a virus.?     It is also important to stay well hydrated, get lots of rest and take over-the-counter decongestants,?tylenol?or ibuprofen if you?are able to?take those medications per your primary care provider to help relieve discomfort.?     If your symptoms worsen, you develop severe headache, vomiting, high fever (>102), or are not improving in 7 days, please contact your primary care provider for an appointment or visit any of our convenient Walk-in Care or Urgent Care Centers to be seen which can be found on our website?here.?     Thanks again for choosing?us?as your health care partner,?   ?  ERNESTINA Jha CNP?

## 2022-10-16 ENCOUNTER — HEALTH MAINTENANCE LETTER (OUTPATIENT)
Age: 57
End: 2022-10-16

## 2022-10-18 DIAGNOSIS — F41.1 GAD (GENERALIZED ANXIETY DISORDER): ICD-10-CM

## 2022-10-18 DIAGNOSIS — M54.30 SCIATIC LEG PAIN: ICD-10-CM

## 2022-10-18 RX ORDER — GABAPENTIN 600 MG/1
600 TABLET ORAL 3 TIMES DAILY
Qty: 270 TABLET | Refills: 3 | Status: SHIPPED | OUTPATIENT
Start: 2022-10-18 | End: 2023-10-30

## 2022-10-18 RX ORDER — VENLAFAXINE HYDROCHLORIDE 150 MG/1
150 CAPSULE, EXTENDED RELEASE ORAL DAILY
Qty: 90 CAPSULE | Refills: 0 | Status: SHIPPED | OUTPATIENT
Start: 2022-10-18 | End: 2022-10-27

## 2022-10-18 NOTE — TELEPHONE ENCOUNTER
Routing refill request to provider for review/approval because:  Drug not on the [facility] refill protocol   Labs not current:    Patient needs to be seen because it has been more than 1 year since last office visit.  Creatinine   Date Value Ref Range Status   06/07/2017 0.67 0.52 - 1.04 mg/dL Final     Team please call to schedule pt for OV with provider.    Shauna NUNEZ RN

## 2022-10-20 NOTE — PROGRESS NOTES
Pre-Visit Planning     Appointment Notes for this encounter: I would like to discuss medications refills    Questionnaires Reviewed/AssignedNo additional questionnaires are needed     Contacted patient via phone/Qloohart. Are there any additional questions or concerns you'd like to review with your provider during your visit? Yes: would like to discuss amitriptyline as hs for back pain     Visit is not preventive.    Meds  Home Meds reviewed and updated    Entered patient-preferred pharmacy.     Current Outpatient Medications   Medication     ACETAMINOPHEN PO     cyclobenzaprine (FLEXERIL) 5 MG tablet     Fexofenadine HCl (ALLEGRA ALLERGY PO)     gabapentin (NEURONTIN) 300 MG capsule     gabapentin (NEURONTIN) 600 MG tablet     levothyroxine (SYNTHROID/LEVOTHROID) 175 MCG tablet     meloxicam (MOBIC) 15 MG tablet     methocarbamol (ROBAXIN) 500 MG tablet     naproxen (NAPROSYN) 500 MG tablet     venlafaxine (EFFEXOR XR) 150 MG 24 hr capsule     Current Facility-Administered Medications   Medication     fentaNYL (PF) (SUBLIMAZE) injection 50 mcg     midazolam (VERSED) injection 1 mg     sodium chloride (PF) 0.9% PF flush 10 mL     MyChart  Patient is active on [employer].    Call Summary  Advised patient to call back at [phone] if needed.       Nathalie Bennett RN

## 2022-10-27 ENCOUNTER — VIRTUAL VISIT (OUTPATIENT)
Dept: FAMILY MEDICINE | Facility: CLINIC | Age: 57
End: 2022-10-27
Payer: COMMERCIAL

## 2022-10-27 DIAGNOSIS — Z12.31 VISIT FOR SCREENING MAMMOGRAM: ICD-10-CM

## 2022-10-27 DIAGNOSIS — F41.1 GAD (GENERALIZED ANXIETY DISORDER): ICD-10-CM

## 2022-10-27 DIAGNOSIS — M47.816 SPONDYLOSIS OF LUMBAR REGION WITHOUT MYELOPATHY OR RADICULOPATHY: ICD-10-CM

## 2022-10-27 DIAGNOSIS — F17.200 TOBACCO USE DISORDER: ICD-10-CM

## 2022-10-27 DIAGNOSIS — M54.30 SCIATIC LEG PAIN: Primary | ICD-10-CM

## 2022-10-27 PROCEDURE — 99214 OFFICE O/P EST MOD 30 MIN: CPT | Mod: 95 | Performed by: FAMILY MEDICINE

## 2022-10-27 RX ORDER — VENLAFAXINE HYDROCHLORIDE 150 MG/1
150 CAPSULE, EXTENDED RELEASE ORAL DAILY
Qty: 90 CAPSULE | Refills: 3 | Status: SHIPPED | OUTPATIENT
Start: 2022-10-27 | End: 2023-08-04

## 2022-10-27 RX ORDER — GABAPENTIN 300 MG/1
300 CAPSULE ORAL AT BEDTIME
Qty: 90 CAPSULE | Refills: 3 | Status: SHIPPED | OUTPATIENT
Start: 2022-10-27 | End: 2024-05-31

## 2022-10-27 NOTE — PROGRESS NOTES
Josiah is a 57 year old who is being evaluated via a billable video visit.      How would you like to obtain your AVS? MyChart  If the video visit is dropped, the invitation should be resent by: Text to cell phone: [phone]  Will anyone else be joining your video visit? No      Assessment & Plan     Sciatic leg pain - managed with oral medication and following with pain clinic for injections.   - gabapentin (NEURONTIN) 300 MG capsule; Take 1 capsule (300 mg) by mouth At Bedtime Take with 600 mg capsule  - amitriptyline (ELAVIL) 25 MG tablet; Take 1 tablet (25 mg) by mouth At Bedtime    Spondylosis of lumbar region without myelopathy or radiculopathy - would like to try TCA to better help with sleep. Cautioned on sedation with both TCA and GBP.   - amitriptyline (ELAVIL) 25 MG tablet; Take 1 tablet (25 mg) by mouth At Bedtime    NAGI (generalized anxiety disorder) - stable, refills  - venlafaxine (EFFEXOR XR) 150 MG 24 hr capsule; Take 1 capsule (150 mg) by mouth daily    Visit for screening mammogram  - MA Screen Bilateral w/Nacho; Future    Tobacco use disorder - encouraged cessation.     Return in about 1 year (around 10/27/2023) for as needed.    Ely Dozier MD  [facility]    Subjective   Josiah is a 57 year old, presenting for the following health issues:  No chief complaint on file.      HPI       Medication refills.     NAGI - doing well with effexor, would like to continue same dose.     Sciatica, spondylosis lumbar- following with Pain clinic, upcoming ablation. On GBP TID. Would like better help overnight, reports waking every couple hours in pain.       Review of Systems   Constitutional, HEENT, cardiovascular, pulmonary, gi and gu systems are negative, except as otherwise noted.      Objective           Vitals:  No vitals were obtained today due to virtual visit.    Physical Exam   GENERAL: Healthy, alert and no distress  EYES: Eyes grossly normal to inspection.  No discharge  or erythema, or obvious scleral/conjunctival abnormalities.  RESP: No audible wheeze, cough, or visible cyanosis.  No visible retractions or increased work of breathing.    SKIN: Visible skin clear. No significant rash, abnormal pigmentation or lesions.  NEURO: Cranial nerves grossly intact.  Mentation and speech appropriate for age.  PSYCH: Mentation appears normal, affect normal/bright, judgement and insight intact, normal speech and appearance well-groomed.          Video-Visit Details    Video Start Time: 10:49 AM    Type of service:  Video Visit    Video End Time: 11:00 AM    Originating Location (pt. Location): Home        Distant Location (provider location):  Off-site    Platform used for Video Visit: Zerply

## 2022-12-15 ENCOUNTER — MYC MEDICAL ADVICE (OUTPATIENT)
Dept: FAMILY MEDICINE | Facility: CLINIC | Age: 57
End: 2022-12-15

## 2022-12-15 DIAGNOSIS — M54.30 SCIATIC LEG PAIN: ICD-10-CM

## 2022-12-15 DIAGNOSIS — M47.816 SPONDYLOSIS OF LUMBAR REGION WITHOUT MYELOPATHY OR RADICULOPATHY: ICD-10-CM

## 2022-12-15 RX ORDER — AMITRIPTYLINE HYDROCHLORIDE 50 MG/1
50 TABLET ORAL AT BEDTIME
Qty: 90 TABLET | Refills: 3 | Status: SHIPPED | OUTPATIENT
Start: 2022-12-15 | End: 2024-01-11

## 2023-01-10 ENCOUNTER — OFFICE VISIT (OUTPATIENT)
Dept: URGENT CARE | Facility: URGENT CARE | Age: 58
End: 2023-01-10
Payer: COMMERCIAL

## 2023-01-10 ENCOUNTER — MYC MEDICAL ADVICE (OUTPATIENT)
Dept: FAMILY MEDICINE | Facility: CLINIC | Age: 58
End: 2023-01-10

## 2023-01-10 ENCOUNTER — NURSE TRIAGE (OUTPATIENT)
Dept: FAMILY MEDICINE | Facility: CLINIC | Age: 58
End: 2023-01-10

## 2023-01-10 VITALS
DIASTOLIC BLOOD PRESSURE: 80 MMHG | RESPIRATION RATE: 18 BRPM | SYSTOLIC BLOOD PRESSURE: 132 MMHG | TEMPERATURE: 97.7 F | OXYGEN SATURATION: 97 % | HEART RATE: 98 BPM

## 2023-01-10 DIAGNOSIS — J06.9 VIRAL URI WITH COUGH: Primary | ICD-10-CM

## 2023-01-10 PROCEDURE — 99213 OFFICE O/P EST LOW 20 MIN: CPT | Performed by: PHYSICIAN ASSISTANT

## 2023-01-10 RX ORDER — ALBUTEROL SULFATE 90 UG/1
2 AEROSOL, METERED RESPIRATORY (INHALATION) EVERY 6 HOURS PRN
Qty: 18 G | Refills: 0 | Status: SHIPPED | OUTPATIENT
Start: 2023-01-10 | End: 2024-05-21

## 2023-01-10 NOTE — PROGRESS NOTES
Assessment & Plan     Viral URI with cough  Acute problem. Lungs with faint expiratory wheezing.  Vitals are stable.  She is afebrile.  She is in no acute respiratory distress.  Albuterol inhaler prescribed as needed for wheezing.  Advised to keep monitoring symptoms.  Follow-up if any worsening symptoms.  Patient agrees.  - albuterol (PROAIR HFA/PROVENTIL HFA/VENTOLIN HFA) 108 (90 Base) MCG/ACT inhaler  Dispense: 18 g; Refill: 0       Return in about 1 week (around 1/17/2023) for Symptoms failing to improve.    Rosanne Henderson PA-C  [facility] MorrisKEISHA Rahman is a 57 year old female who presents to clinic today for the following health issues:  Chief Complaint   Patient presents with     URI     Symptoms began Friday with HA and sinus pressure. Symptoms have progressed into wheezing with deep breaths, productive cough, chest congestion, mild SOB.   Negative home covid test X 3.   Pt reports diminished sense of smell and taste.      HPI      URI Adult    Onset of symptoms was 5 day(s) ago.  Course of illness is improving.    Severity moderate  Current and Associated symptoms:  cough, expiratory wheezing  Denies fever/v/d. No CP. NO severe SOB.  Treatment measures tried include: guaifenesin  Predisposing factors include None.  No chills.  Patient reports negative home COVID test x3.      Review of Systems  Constitutional, HEENT, cardiovascular, pulmonary, GI, , musculoskeletal, neuro, skin, endocrine and psych systems are negative, except as otherwise noted.      Objective    /80 (BP Location: Right arm, Patient Position: Sitting, Cuff Size: Adult Regular)   Pulse 98   Temp 97.7  F (36.5  C) (Tympanic)   Resp 18   LMP 08/12/2012   SpO2 97%   Physical Exam   GENERAL: healthy, alert and no distress  HENT: ear canals and TM's normal,  mouth without ulcers or lesions  RESP: lungs with faint expiratory wheezes, no rales, no rhonchi  CV: regular rate and rhythm, normal S1  S2  MS: no gross musculoskeletal defects noted, no edema  SKIN: no suspicious lesions or rashes

## 2023-01-10 NOTE — TELEPHONE ENCOUNTER
"    Reason for Disposition    MILD difficulty breathing (e.g., minimal/no SOB at rest, SOB with walking, pulse < 100) of new-onset or worse than normal    Additional Information    Negative: SEVERE difficulty breathing (e.g., struggling for each breath, speaks in single words, pulse > 120)    Negative: Breathing stopped and hasn't returned    Negative: Choking on something    Negative: Bluish (or gray) lips or face    Negative: Difficult to awaken or acting confused (e.g., disoriented, slurred speech)    Negative: Passed out (i.e., fainted, collapsed and was not responding)    Negative: Wheezing started suddenly after medicine, an allergic food, or bee sting    Negative: Stridor    Negative: Slow, shallow and weak breathing    Negative: Sounds like a life-threatening emergency to the triager    Negative: Chest pain    Negative: Wheezing (high pitched whistling sound) and previous asthma attacks or use of asthma medicines    Negative: Difficulty breathing and within 14 days of COVID-19 Exposure    Negative: Difficulty breathing and only present when coughing    Negative: Difficulty breathing and only from stuffy nose    Negative: Difficulty breathing and only from stuffy nose or runny nose from common cold    Negative: MODERATE difficulty breathing (e.g., speaks in phrases, SOB even at rest, pulse 100-120) of new-onset or worse than normal    Negative: Oxygen level (e.g., pulse oximetry) 90 percent or lower    Negative: Wheezing can be heard across the room    Negative: Drooling or spitting out saliva (because can't swallow)    Negative: Any history of prior \"blood clot\" in leg or lungs    Negative: Illness requiring prolonged bedrest in past month (e.g., immobilization, long hospital stay)    Negative: Hip or leg fracture (broken bone) in past month (or had cast on leg or ankle in past month)    Negative: Major surgery in the past month    Negative: Long-distance travel in past month (e.g., car, bus, train, plane; " "with trip lasting 6 or more hours)    Negative: Cancer treatment in past six months (or has cancer now)    Negative: Extra heart beats OR irregular heart beating (i.e., \"palpitations\")    Negative: Fever > 103 F (39.4 C)    Negative: Fever > 101 F (38.3 C) and over 60 years of age    Negative: Fever > 100.0 F (37.8 C) and bedridden (e.g., nursing home patient, stroke, chronic illness, recovering from surgery)    Negative: Fever > 100.0 F (37.8 C) and diabetes mellitus or weak immune system (e.g., HIV positive, cancer chemo, splenectomy, organ transplant, chronic steroids)    Negative: Periods where breathing stops and then resumes normally and bedridden (e.g., nursing home patient, CVA)    Negative: Pregnant or postpartum (from 0 to 6 weeks after delivery)    Negative: Patient sounds very sick or weak to the triager    Answer Assessment - Initial Assessment Questions  1. RESPIRATORY STATUS: \"Describe your breathing?\" (e.g., wheezing, shortness of breath, unable to speak, severe coughing)       Wheezing, severe coughing last few days but not today  2. ONSET: \"When did this breathing problem begin?\"       This am  3. PATTERN \"Does the difficult breathing come and go, or has it been constant since it started?\"       Comes and goes, worse with deep breath  4. SEVERITY: \"How bad is your breathing?\" (e.g., mild, moderate, severe)     - MILD: No SOB at rest, mild SOB with walking, speaks normally in sentences, can lie down, no retractions, pulse < 100.     - MODERATE: SOB at rest, SOB with minimal exertion and prefers to sit, cannot lie down flat, speaks in phrases, mild retractions, audible wheezing, pulse 100-120.     - SEVERE: Very SOB at rest, speaks in single words, struggling to breathe, sitting hunched forward, retractions, pulse > 120       mild  5. RECURRENT SYMPTOM: \"Have you had difficulty breathing before?\" If Yes, ask: \"When was the last time?\" and \"What happened that time?\"       no  6. CARDIAC HISTORY: \"Do " "you have any history of heart disease?\" (e.g., heart attack, angina, bypass surgery, angioplasty)       no  7. LUNG HISTORY: \"Do you have any history of lung disease?\"  (e.g., pulmonary embolus, asthma, emphysema)      no  8. CAUSE: \"What do you think is causing the breathing problem?\"       URI, COVID exposure- 3 negative COVID tests every other day  9. OTHER SYMPTOMS: \"Do you have any other symptoms? (e.g., dizziness, runny nose, cough, chest pain, fever)      Cough, runny nose, loss of taste and smell  10. O2 SATURATION MONITOR:  \"Do you use an oxygen saturation monitor (pulse oximeter) at home?\" If Yes, \"What is your reading (oxygen level) today?\" \"What is your usual oxygen saturation reading?\" (e.g., 95%)        no  11. PREGNANCY: \"Is there any chance you are pregnant?\" \"When was your last menstrual period?\"        n/a  12. TRAVEL: \"Have you traveled out of the country in the last month?\" (e.g., travel history, exposures)        no    Protocols used: BREATHING DIFFICULTY-A-OH      "

## 2023-01-31 ENCOUNTER — TELEPHONE (OUTPATIENT)
Dept: PALLIATIVE MEDICINE | Facility: CLINIC | Age: 58
End: 2023-01-31
Payer: COMMERCIAL

## 2023-01-31 DIAGNOSIS — M79.18 MYOFASCIAL PAIN: ICD-10-CM

## 2023-01-31 RX ORDER — MELOXICAM 15 MG/1
15 TABLET ORAL DAILY PRN
Qty: 30 TABLET | Refills: 0 | Status: SHIPPED | OUTPATIENT
Start: 2023-01-31 | End: 2023-03-17

## 2023-01-31 NOTE — TELEPHONE ENCOUNTER
Signed Prescriptions:                        Disp   Refills    meloxicam (MOBIC) 15 MG tablet             30 tab*0        Sig: Take 1 tablet (15 mg) by mouth daily as needed for           moderate pain (4-6)  Authorizing Provider: GLORIA MONTEZ    Prescription refilled today. I have not seen patient since July 2022. Please call patient to inform her that she needs to either follow up with me prior to any other refills or discuss ongoing refills with PCP.    Gloria Montez MD  [facility]

## 2023-01-31 NOTE — TELEPHONE ENCOUNTER
Received fax from pharmacy requesting refill(s) for meloxicam (MOBIC) 15 MG tablet     Last refilled on 10/21/22    Patient last seen on 07/26/22  Next appt scheduled for None    E-prescribe to:     [id number] - [place], [place] - [address]  [facility] [address] [place], [place] - [address]     Will facilitate refill.      Quiana Real MA  [facility] [place]

## 2023-02-02 ENCOUNTER — TELEPHONE (OUTPATIENT)
Dept: PALLIATIVE MEDICINE | Facility: CLINIC | Age: 58
End: 2023-02-02
Payer: COMMERCIAL

## 2023-02-02 NOTE — TELEPHONE ENCOUNTER
Advised that we will not make med changes unitl she is seen for OV    Pt scheduled for 3/21    Closing    Tonia ROJAS RN Care Coordinator  [facility]

## 2023-02-02 NOTE — TELEPHONE ENCOUNTER
[facility] Call Center    Phone Message    May a detailed message be left on voicemail: yes     Reason for Call: Other: Patient is wondering if she can increase dose of methocarbamol (ROBAXIN) 500 MG tablet or cyclobenzaprine (FLEXERIL) 5 MG tablet, stating niether seemed to help, if not she is wondering if there is a different one she can try instead. Patient states she has been waking up in the night with back pain.    Action Taken: Message routed to:  Other: [facility]    Travel Screening: Not Applicable

## 2023-02-02 NOTE — TELEPHONE ENCOUNTER
[facility] Call Center    Phone Message    May a detailed message be left on voicemail: yes     Reason for Call: Other: Patient returned call to schedule follow up appointment, scheduled for 3/21/2023.      Action Taken: Message routed to:  Other: Pain    Travel Screening: Not Applicable

## 2023-02-26 ENCOUNTER — OFFICE VISIT (OUTPATIENT)
Dept: URGENT CARE | Facility: URGENT CARE | Age: 58
End: 2023-02-26
Payer: COMMERCIAL

## 2023-02-26 VITALS
HEART RATE: 95 BPM | SYSTOLIC BLOOD PRESSURE: 130 MMHG | TEMPERATURE: 97.6 F | DIASTOLIC BLOOD PRESSURE: 84 MMHG | OXYGEN SATURATION: 98 %

## 2023-02-26 DIAGNOSIS — G89.29 ACUTE EXACERBATION OF CHRONIC LOW BACK PAIN: Primary | ICD-10-CM

## 2023-02-26 DIAGNOSIS — M54.32 BILATERAL SCIATICA: ICD-10-CM

## 2023-02-26 DIAGNOSIS — M54.50 ACUTE EXACERBATION OF CHRONIC LOW BACK PAIN: Primary | ICD-10-CM

## 2023-02-26 DIAGNOSIS — M54.31 BILATERAL SCIATICA: ICD-10-CM

## 2023-02-26 PROCEDURE — 99213 OFFICE O/P EST LOW 20 MIN: CPT | Performed by: PHYSICIAN ASSISTANT

## 2023-02-26 RX ORDER — METHYLPREDNISOLONE 4 MG
TABLET, DOSE PACK ORAL
Qty: 21 TABLET | Refills: 0 | Status: SHIPPED | OUTPATIENT
Start: 2023-02-26 | End: 2023-03-21

## 2023-02-26 RX ORDER — TIZANIDINE 2 MG/1
2-4 TABLET ORAL 3 TIMES DAILY PRN
Qty: 30 TABLET | Refills: 0 | Status: SHIPPED | OUTPATIENT
Start: 2023-02-26 | End: 2023-03-21

## 2023-02-26 NOTE — PROGRESS NOTES
Assessment/Plan:  Suspect symptoms due to her chronic back issues exacerbated by recent events. I did recommend Xrays due to recent fall and mild midline tenderness, pt declines and states symptoms are c/w her previous flares of her chronic issues with her back.  Emergent MRI is not indicated as this patient does not have new weakness or cauda equina syndrome.  Patient has no bowel or bladder incontinence or saddle anesthesia; strength is 5/5 BLE. Will treat today with medrol dosepak and tizanidine (pt would like to try a different muscle relaxer than she usually takes), patient to follow up with PCP if no improvement, and also f/u with pain managemnet.    See patient instructions below.    At the end of the encounter, I discussed results, diagnosis, medications. Discussed red flags for immediate return to clinic/ER, as well as indications for follow up if no improvement. Patient understood and agreed to plan. Patient was stable for discharge.      ICD-10-CM    1. Acute exacerbation of chronic low back pain  M54.50 tiZANidine (ZANAFLEX) 2 MG tablet    G89.29       2. Bilateral sciatica  M54.31 methylPREDNISolone (MEDROL DOSEPAK) 4 MG tablet therapy pack    M54.32             Return in about 1 week (around 3/5/2023) for Follow up w/ primary care provider if not better.    DEO Diaz, PA-[facility]    ----------------------------------------------------------------------------------------------------------------------------------------------------------------  HPI:  Arabella Hart is a 57 year old female who presents for evaluation of bilateral low back pain onset 2 weeks ago. She has a hx of chronic back pain & sciatica due to spondylosis and sees pain management. She had been doing well for about 9 months but slipped on the ice and fell onto her buttocks, then had to sleep on a hard cot at work when she worked a double shift to the [employer] 4 days ago and she believes  this caused an exacerbation of her back pain. She typically takes Flexeril or Robaxin but is out of these. She has an appt with pain management in about 1 month. Pain radiates into both buttocks and posterior thighs, and is worse with movement and walking. Symptoms are moderate in severity & constant in duration. Patient reports no fever/chills, headache, chest pain, shortness of breath, abdominal pain, nausea/vomiting, bowel/bladder incontinence, urinary symptoms, saddle anesthesia, numbness, weakness or any other symptoms.     Past Medical History:   Diagnosis Date     Abnormal Papanicolaou smear of cervix and cervical HPV     colpo done     ASCUS on Pap smear 12/20/06    + HPV     History of colposcopy with cervical biopsy 1/24/07    Atypical squamous metaplasia         Vitals:    02/26/23 1131   BP: 130/84   BP Location: Right arm   Patient Position: Sitting   Cuff Size: Adult Regular   Pulse: 95   Temp: 97.6  F (36.4  C)   TempSrc: Tympanic   SpO2: 98%       Physical Exam  Vitals and nursing note reviewed.   Cardiovascular:      Pulses:           Dorsalis pedis pulses are 2+ on the right side and 2+ on the left side.   Pulmonary:      Effort: Pulmonary effort is normal.   Musculoskeletal:      Lumbar back: Bony tenderness present.        Back:       Comments: Dorsiflexion intact bilaterally. Negative SLR. Patient ambulatory.   Neurological:      Mental Status: She is oriented to person, place, and time.      Gait: Gait normal.      Comments: Strength 5/5 BLE         Labs/Imaging:  No results found for this or any previous visit (from the past 24 hour(s)).  No results found for this or any previous visit (from the past 24 hour(s)).      Patient Instructions   Caring for Your Back    You are not alone.    Low back pain is very common. Nearly half of all adults will have low back pain in any given year. The good news is that back pain is rarely a danger to your health. Most people can manage their back pain on  their own. About half of people start feeling better within two weeks. In 9 out of 10 cases, low back pain goes away or no longer limits daily activity within 6 weeks.     Your outlook is good!     Your symptoms tell us that your low back pain is most likely not a danger to you. Most of the time we will not know the exact cause of low back pain, even if you see a doctor or have an MRI. However, treatment can still work without knowing the cause of the pain. Less than 1 in 100 people need surgery for their back pain.     What can I do about my low back pain?     There are three basic things you can do to ease low back pain and help it go away.     Use heat or cold packs.    Take medicine as directed.    Use positions, movements and exercises.    Using heat or cold packs:    Try cold packs or gentle heat to ease your pain.  Use whichever gives you the most relief. Apply the cold pack or heat for 15 minutes at a time, as often as needed.    Taking medicine:    If taking over-the-counter medicine:    Take ibuprofen (Advil, Motrin) 600 mg three times a day as needed for pain.  OR    Take Aleve (naproxen) 220 to 440 mg two times a day as needed for pain. If your doctor prescribed a muscle relaxant (cyclobenzaprine 10 mg.):    Take   to 1 tablet at bedtime.    Do not drive when taking this medicine. This drug may make you sleepy.     Using positions, movements and exercises:    Research tells us that moving your joints and muscles can help you recover from back pain. Such activity should be simple and gentle. Use the positions below as well as walking to help relieve your pain. Try taking a short walk every 3 to 4 hours during the day. Walk for a few minutes inside your home or take longer walks outside, on a treadmill or at a mall. Slowly increase the amount of time you walk. Expect discomfort when you begin, but it should lessen as your back starts to heal. When your back feels better, walk daily to keep your back and  body healthy.    Finding a position that is comfortable:    When your back pain is new, certain positions will ease your pain. Gently try each of the positions below until you find one that is helpful. Once you find a position of comfort, use it as often as you like when you are resting. You will recover faster if you combine rest with activity.    * Lie on your back with your legs bent. You can do this by placing a pillow under your knees or lie on the floor and rest your lower legs on the seat of a chair.  * Lie on your side with your knees bent and place a pillow between your knees.    Lie on your stomach over pillows.       When should I call my doctor?    Your back pain should improve over the first couple of weeks. As it improves, you should be able to return to your normal activities.  But call your doctor if:      You have a sudden change in your ability to control your bladder or bowels.    You begin to feel tingling in your groin or legs.    The pain spreads down your leg and into your foot.    Your toes, feet or leg muscles begin to feel weak.    You feel generally unwell or sick.    Your pain gets worse.    If you are deaf or hard of hearing, please let us know. We provide many free services including sign language interpreters, oral interpreters, TTYs, telephone amplifiers, note takers and written materials.    For informational purposes only. Not to replace the advice of your health care provider. Copyright   2013 [employer] [facility]. All rights reserved. [employer] [id number] - 04/13.

## 2023-02-26 NOTE — PATIENT INSTRUCTIONS
Caring for Your Back    You are not alone.    Low back pain is very common. Nearly half of all adults will have low back pain in any given year. The good news is that back pain is rarely a danger to your health. Most people can manage their back pain on their own. About half of people start feeling better within two weeks. In 9 out of 10 cases, low back pain goes away or no longer limits daily activity within 6 weeks.     Your outlook is good!     Your symptoms tell us that your low back pain is most likely not a danger to you. Most of the time we will not know the exact cause of low back pain, even if you see a doctor or have an MRI. However, treatment can still work without knowing the cause of the pain. Less than 1 in 100 people need surgery for their back pain.     What can I do about my low back pain?     There are three basic things you can do to ease low back pain and help it go away.    Use heat or cold packs.   Take medicine as directed.   Use positions, movements and exercises.    Using heat or cold packs:    Try cold packs or gentle heat to ease your pain.  Use whichever gives you the most relief. Apply the cold pack or heat for 15 minutes at a time, as often as needed.    Taking medicine:    If taking over-the-counter medicine:   Take ibuprofen (Advil, Motrin) 600 mg three times a day as needed for pain.  OR   Take Aleve (naproxen) 220 to 440 mg two times a day as needed for pain. If your doctor prescribed a muscle relaxant (cyclobenzaprine 10 mg.):   Take   to 1 tablet at bedtime.   Do not drive when taking this medicine. This drug may make you sleepy.     Using positions, movements and exercises:    Research tells us that moving your joints and muscles can help you recover from back pain. Such activity should be simple and gentle. Use the positions below as well as walking to help relieve your pain. Try taking a short walk every 3 to 4 hours during the day. Walk for a few minutes inside your home or  take longer walks outside, on a treadmill or at a mall. Slowly increase the amount of time you walk. Expect discomfort when you begin, but it should lessen as your back starts to heal. When your back feels better, walk daily to keep your back and body healthy.    Finding a position that is comfortable:    When your back pain is new, certain positions will ease your pain. Gently try each of the positions below until you find one that is helpful. Once you find a position of comfort, use it as often as you like when you are resting. You will recover faster if you combine rest with activity.    * Lie on your back with your legs bent. You can do this by placing a pillow under your knees or lie on the floor and rest your lower legs on the seat of a chair.  * Lie on your side with your knees bent and place a pillow between your knees.   Lie on your stomach over pillows.       When should I call my doctor?    Your back pain should improve over the first couple of weeks. As it improves, you should be able to return to your normal activities.  But call your doctor if:     You have a sudden change in your ability to control your bladder or bowels.   You begin to feel tingling in your groin or legs.   The pain spreads down your leg and into your foot.   Your toes, feet or leg muscles begin to feel weak.   You feel generally unwell or sick.   Your pain gets worse.    If you are deaf or hard of hearing, please let us know. We provide many free services including sign language interpreters, oral interpreters, TTYs, telephone amplifiers, note takers and written materials.    For informational purposes only. Not to replace the advice of your health care provider. Copyright   2013 [employer]. All rights reserved. [employer] [id number] - 04/13.

## 2023-03-14 DIAGNOSIS — M79.18 MYOFASCIAL PAIN: ICD-10-CM

## 2023-03-14 NOTE — TELEPHONE ENCOUNTER
Received fax from pharmacy requesting refill(s) for     meloxicam (MOBIC) 15 MG tablet     Date last filled 02/12/2023    Last Appt Date:09/13/2022    Next Appt scheduled: 03/21/2023    Pharmacy:      [id number] - [place], [place] - [address] Zachery campos for processing    Marga Guerin MA  [facility] [place]

## 2023-03-17 RX ORDER — MELOXICAM 15 MG/1
15 TABLET ORAL DAILY PRN
Qty: 30 TABLET | Refills: 0 | Status: SHIPPED | OUTPATIENT
Start: 2023-03-17 | End: 2024-05-21

## 2023-03-17 NOTE — TELEPHONE ENCOUNTER
Signed Prescriptions:                        Disp   Refills    meloxicam (MOBIC) 15 MG tablet             30 tab*0        Sig: Take 1 tablet (15 mg) by mouth daily as needed for           moderate pain (4-6)  Authorizing Provider: GLORIA DIAZ MD  [facility]

## 2023-03-21 ENCOUNTER — OFFICE VISIT (OUTPATIENT)
Dept: PALLIATIVE MEDICINE | Facility: CLINIC | Age: 58
End: 2023-03-21
Attending: PHYSICAL MEDICINE & REHABILITATION
Payer: COMMERCIAL

## 2023-03-21 VITALS — HEART RATE: 104 BPM | DIASTOLIC BLOOD PRESSURE: 78 MMHG | SYSTOLIC BLOOD PRESSURE: 133 MMHG | OXYGEN SATURATION: 98 %

## 2023-03-21 DIAGNOSIS — M53.3 SACROILIAC JOINT PAIN: Primary | ICD-10-CM

## 2023-03-21 DIAGNOSIS — M47.816 LUMBAR FACET ARTHROPATHY: ICD-10-CM

## 2023-03-21 DIAGNOSIS — M47.817 LUMBOSACRAL SPONDYLOSIS WITHOUT MYELOPATHY: ICD-10-CM

## 2023-03-21 PROCEDURE — 99214 OFFICE O/P EST MOD 30 MIN: CPT | Performed by: PHYSICAL MEDICINE & REHABILITATION

## 2023-03-21 RX ORDER — TIZANIDINE 2 MG/1
2-4 TABLET ORAL 3 TIMES DAILY PRN
Qty: 90 TABLET | Refills: 1 | Status: SHIPPED | OUTPATIENT
Start: 2023-03-21 | End: 2023-07-25

## 2023-03-21 ASSESSMENT — PAIN SCALES - GENERAL: PAINLEVEL: MODERATE PAIN (5)

## 2023-03-21 NOTE — PATIENT INSTRUCTIONS
Continue tizanidine 2-4 mg three times daily as needed. Cyclobenzaprine and methocarbamol discontinued from your medication list.   Continue trying to stay physically active at home.   Order placed for sacroiliac joint injections.   Try to use anti-inflammatory medications sparingly when pain is more severe. Recommend not taking this on a daily basis.   Follow up with me 4-6 weeks after the injections.  Caridad Montez MD  [facility]     ----------------------------------------------------------------  Clinic Number:  [phone]   Call with any questions about your care and for scheduling assistance.   Calls are returned Monday through Friday between 8 AM and 4:30 PM. We usually get back to you within 2 business days depending on the issue/request.    If we are prescribing your medications:  For opioid medication refills, call the clinic or send a [employer] message 7 days in advance.  Please include:  Name of requested medication  Name of the pharmacy.  For non-opioid medications, call your pharmacy directly to request a refill. Please allow 3-4 days to be processed.   Per [place] State Law:  All controlled substance prescriptions must be filled within 30 days of being written.    For those controlled substances allowing refills, pickup must occur within 30 days of last fill.      We believe regular attendance is key to your success in our program!    Any time you are unable to keep your appointment we ask that you call us at least 24 hours in advance to cancel.This will allow us to offer the appointment time to another patient.   Multiple missed appointments may lead to dismissal from the clinic.

## 2023-03-21 NOTE — PROGRESS NOTES
[facility]    Date of visit: 3/21/2023    Assessment:  Arabella Hart is a 57 year old female with a past medical history significant for NAGI, IBS who presents with complaints of:     1. Chronic back pain: Chronic axial low back pain for over 2 years. No improvement with PT. Has associated stiffness in the mornings. Pain is aggravated with activity Etiology secondary to lumbar spondylosis, facet arthropathy. Lumbar RFA completed with significant improvement.    2. Sacroiliac joint pain, bilateral: Symptoms ongoing for 3+ years. No improvement with PT or lumbar RFA for this pain. On exam, they have findings suggestive of sacroiliac joint dysfunction. Improved with prior right SI joint injection. She is now having symptoms on both sides.     3. Left lumbar radiculitis: pain in left leg radiating into the posterolateral thigh with numbness felt in the toes. MRI of lumbar spine shows moderate to severe left foraminal stenosis at L5-S1 which seems to correlate with her symptoms. Pain is improved after lumber Transforaminal epidural steroid injection with ongoing improvement to date.     Plan:  1. Therapies: None at this time. She will try to stay active at home.   2. Diagnostic Studies:None  3. Medication Management:    1. Continue tizanidine 2-4 mg TID prn  2. Continue utilizing topical medications prn  3. Use NSAIDs sparingly  4. Procedures recommended:   1. Order placed for bilateral SI joint injections.  2. May repeat lumbar RFA in the future  3.  Lumbar Transforaminal epidural steroid injection may be repeated in the future.  5. Follow up: in clinic 4-6 weeks after injections    Chief complaint:   Chief Complaint   Patient presents with     Pain     Interval history:  Arabella Hrat is a 57 year old female last seen by me on 7/26/2022.      Since her last visit, Arabella Hart reports:  - she had a fall on the ice about a month ago. Went to urgent  care. Caused a flare of back pain. Started a medrol dose brandon. Muscle relaxer changed to tizanidine which was more helpful than other muscle relaxants she tried. Back pain is better than it was initially but still painful. Located in bilateral low back to buttock.   - Last lumbar RFA was completed in September 2022. She is unsure if the pain she is having now is the low back or SI joint pain. Last SI joint injection on the right completed on 12/2/2021. This was helpful but unsure how long it lasted at this time.     Pain scores:  Pain intensity on average 2-5 on a scale of 0-10.     Medications:       Current pain medications:  Venlafaxine 37.5 +75 mg daily  Gabapentin 600 mg BID and 900 q hs - [facility]  Tylenol 1,000 mg TID prn - [facility]  Essential oils - [facility]  meloxicam 15 mg daily prn - [facility]   Amitriptyline 50 mg q hs - [facility]          Other relevant medications:  NA     Current calculated MME: 0          Previous pain medications:  Aspercrembe, biofreeze, bengay,   CBD topical - takes edge off  Cyclobenzaprine - [facility]  Methocarbamol - [facility]  Tizanidine - [facility]     Past pain treatments:  PT: Yes - twice since 2016. about a year ago - [facility]. She does stretches at home. Used to walk more in the past.   TENs Unit: Yes - [facility]  Injections:   Left L4-5 Transforaminal epidural steroid injection - [facility]  Right SI joint injection - [facility]  Lumbar RFA- [facility]  Bilateral L5-S1 facet joint injections - [facility] for 2 months  Self-care:   Ice - [facility], heat - [facility]  Surgeries related to pain: None  Alternative Therapies:              Chiropractic: Yes - [facility]               Acupuncture: No  Other: None     Side Effects: no side effect    Medications:  Current Outpatient Medications   Medication Sig Dispense Refill     ACETAMINOPHEN PO Take 325-750 mg by mouth every 6 hours as needed       albuterol (PROAIR HFA/PROVENTIL HFA/VENTOLIN HFA) 108 (90 Base) MCG/ACT inhaler Inhale 2 puffs into the lungs every 6 hours as needed for wheezing 18 g 0     amitriptyline (ELAVIL) 50 MG tablet Take 1  tablet (50 mg) by mouth At Bedtime 90 tablet 3     cyclobenzaprine (FLEXERIL) 5 MG tablet Take 1-2 tablets (5-10 mg) by mouth 3 times daily as needed for muscle spasms 90 tablet 3     Fexofenadine HCl (ALLEGRA ALLERGY PO)         gabapentin (NEURONTIN) 300 MG capsule Take 1 capsule (300 mg) by mouth At Bedtime Take with 600 mg capsule 90 capsule 3     gabapentin (NEURONTIN) 600 MG tablet Take 1 tablet (600 mg) by mouth 3 times daily 270 tablet 3     levothyroxine (SYNTHROID/LEVOTHROID) 175 MCG tablet Take 2 tablets one day of the week and 1 tablet the rest of the days of the week 102 tablet 3     meloxicam (MOBIC) 15 MG tablet Take 1 tablet (15 mg) by mouth daily as needed for moderate pain (4-6) 30 tablet 0     methocarbamol (ROBAXIN) 500 MG tablet Take 1 tablet (500 mg) by mouth 4 times daily as needed for muscle spasms 120 tablet 1     tiZANidine (ZANAFLEX) 2 MG tablet Take 1-2 tablets (2-4 mg) by mouth 3 times daily as needed for muscle spasms (Patient not taking: Reported on 3/21/2023) 30 tablet 0     venlafaxine (EFFEXOR XR) 150 MG 24 hr capsule Take 1 capsule (150 mg) by mouth daily 90 capsule 3       Physical Exam:  Blood pressure 133/78, pulse 104, last menstrual period 08/12/2012, SpO2 98 %, not currently breastfeeding.  General: NAD, pleasant  Gait: Normal  MSK exam: Lumbar ROM is mildly reduced in all planes. Pain with lumbar extension and extension/rotation to both sides. Pain with palpation of bilateral PSIS. Pain with compression of SI joints on right and left. Positive yeoman's bilaterally. Positive CYNDY bilaterally. Strength is 5/5 and symmetric in the lower extremities.      MD WESLEY Porras [facility]     BILLING TIME DOCUMENTATION:   The total TIME spent on this patient on the date of the encounter/appointment was 19 minutes.      TOTAL TIME includes:   Time spent preparing to see the patient (reviewing records and tests)  Time spent face to face (or over the phone) with  the patient   Time spent ordering tests, medications, procedures and referrals   Time spent documenting clinical information in Epic

## 2023-03-22 ENCOUNTER — TELEPHONE (OUTPATIENT)
Dept: PALLIATIVE MEDICINE | Facility: CLINIC | Age: 58
End: 2023-03-22

## 2023-03-22 DIAGNOSIS — M53.3 SACROILIAC JOINT PAIN: Primary | ICD-10-CM

## 2023-03-22 NOTE — TELEPHONE ENCOUNTER
Screening Questions for Radiology Injections:    Injection to be done at which interventional clinic site? [facility]    Procedure ordered by [facility]    Procedure ordered? Bilateral sacroiliac joint injections     Transforaminal Cervical PETER - Send to [facility] ([facility]) - No [facility] Site providers perform this procedure    What insurance would patient like us to bill for this procedure? [employer]    Worker's comp or MVA (motor vehicle accident) -Any injection DO NOT SCHEDULE and route to Giovanni Zapata.      [employer] insurance - For SI joint injections, DO NOT SCHEDULE and route to Jennifer Flores.       ALL BCBS, [employer] and [employer] - DO NOT SCHEDULE and route to Jennifer Flores    MEDICA- facet joint injections, route to Jennifer Flores    IF SCHEDULING IN [place] PLEASE SCHEDULE AT LEAST 7-10 BUSINESS DAYS OUT SO A PA CAN BE OBTAINED    Is an  needed? No     Patient has a  home? (Review Grid) YES: ok    Any chance of pregnancy? NO   If YES, do NOT schedule and route to RN gail  - Dr. Solis route to Marga Green and PM&R Nurse  [[id number]]      Is patient actively being treated for cancer or immunocompromised? No  If YES, do NOT schedule and route to RN pool/ Dr. Solis's Team    Does the patient have a bleeding or clotting disorder? No     If YES, okay to schedule AND route to RN nurse gail/ Dr. Solis's Team     (For any patients with platelet count <100, RN must forward to provider)    Is patient taking any Blood Thinners OR Antiplatelet medication?  No   If hold needed, do NOT schedule, route to RN pool/ Dr. Solis's Team    Examples:   o Blood Thinners: (Coumadin, Warfarin, Jantoven, Pradaxa, Xarelto, Eliquis, Edoxaban, Enoxaparin, Lovenox, Heparin, Arixtra, Fondaparinux or Fragmin)  o Antiplatelet Medications: (Plavix, Brilinta or Effient)     Is patient taking any aspirin products (includes Excedrin and Fiorinal)? No     If more than 325mg/day, OK to schedule; Instruct Pt to decrease  to less than 325 mg for 7 days AND route to RN pool/ Dr. Solis's Team     For CERVICAL procedures, hold all aspirin products for 6 days.     Tell Pt that if aspirin product is not held for 6 days, the procedure WILL BE cancelled.     Any allergies to contrast dye, iodine, shellfish, or numbing and steroid medications? No    If YES, schedule and add allergy information to appointment notes AND route to the RN pool/ Dr. Solis's Team    If PETER and Contrast Dye / Iodine Allergy? DO NOT SCHEDULE, route to RN pool/ Dr. Solsi's Team    Allergies: Ibuprofen, Monosodium glutamate, and Oxycodone     Does patient have an active infection or treated for one within the past week? No    Is patient currently taking any antibiotics or steroid medications?  No     For patients on chronic, preventative, or prophylactic antibiotics, procedures may be scheduled.     For patients on antibiotics for active or recent infection, schedule 4 days after completed.    For patients on steroid medications, schedule 4 days after completed.     Has the patient had a flu shot or any other vaccinations within the past 7 days? No  If yes, explain that for the vaccine to work best they need to:       wait 1 week before and 1 week after getting any Vaccine    wait 1 week before and 2 weeks after getting Covid Vaccine #2 or BOOSTER    If patient has concerns about the timing, send to RN pool/ Dr. Solis's Team    Does patient have an MRI/CT?  Not Applicable Include Date and Check Procedure Scheduling Grid to see if required.    Was the MRI/CT done within the last 3 years?  NA     If no route to RN Pool/ Dr. Badillos Team    If yes, where was the MRI/CT done?      Refer to PACS Transmissions list for approved external locations and route to RN Pool High Priority/ Dr. Badillos Team    If MRI was not done at approved external location do NOT schedule and route to RN pool/ Dr. Solis's Team      If patient has an imaging disc, the injection MAY be  scheduled but patient must bring disc to appt or appt will be cancelled.    Is patient able to transfer to a procedure table with minimal or no assistance? Yes     If no, do NOT schedule and route to RN Pool/ Dr. Solis's Team    Procedure Specific Instructions:    If celiac plexus block, informed patient NPO for 6 hours and that it is okay to take medications with sips of water, especially blood pressure medications Not Applicable         If this is for a cervical procedure, informed patient that aspirin needs to be held for 6 days.   Not Applicable      Sedation, If Sedation is ordered for any procedure, patient must be NPO for 6 hours prior to procedure Not Applicable      If IV needed:    Do not schedule procedures requiring IV placement in the first appointment of the day or first appointment after lunch. Do NOT schedule at 0745, 0815 or 1245. ok    Instructed patient to arrive 30 minutes early for IV start if required. (Check Procedure Scheduling Grid)  Not Applicable    Reminders:    If you are started on any steroids or antibiotics between now and your appointment, you must contact us because the procedure may need to be cancelled.  Yes      As a reminder, receiving steroids can decrease your body's ability to fight infection.   Would you still like to move forward with scheduling the injection?  Yes      IV Sedation is not provided for procedures. If oral anti-anxiety medication is needed, the patient should request this from their referring provider.      Instruct patient to arrive as directed prior to the scheduled appointment time:  If IV needed 30 minutes before appointment time       For patients 85 or older we recommend having an adult stay w/ them for the remainder of the day.       If the patient is Diabetic, remind them to bring their glucometer.      Does the patient have any questions?  NO  Claudia Zapata  [facility]

## 2023-03-23 NOTE — TELEPHONE ENCOUNTER
[facility] SI Joint Medical Policy - Effective Date- 05/2022  Indications that are covered    Initial sacroiliac joint injection    Sacroiliac joint injections (unilateral or bilateral) are considered medically necessary to diagnose or treat sacroiliac (SI) joint pain when the following criteria are met:  1. Pain limiting activities of daily living for at least 3 months despite physical therapy and other conservative treatments (such as exercise, activity modification or chiropractic care). Documentation of conservative treatments must correspond to the current episode of pain (within 6 months) .    Conservative treatments must include physical therapy (PT), at least 4 visits over a course of 6 weeks or less, completed within the current episode of pain (i.e., within the last six months, as noted above). Active muscle conditioning is required as part of physical therapy.    Physical therapist's notes must be submitted, or there must be a physician's statement in the clinical documents that explains why physical therapy is contraindicated.    If a member is unable to complete physical therapy due to progressively worsening pain and disability, documentation in the physical therapist's notes demonstrating this is required.    The requirement for physical therapy will not be met if there is a failure to complete prescribed physical therapy for non-clinical reasons  1. Pain is below the L5 level - low back and buttock pain with or without groin pain.      Repeat sacroiliac joint injection  1. If fewer than twelve months have lapsed since the most recent previous sacroiliac joint injection, repeat injection is considered medically necessary when the two criteria below are met:  A. The previous injection provided significant benefit  B. Buttock and low back pain below L5 level has reoccurred  2. A maximum of three injections per side, per 12-month period will be authorized if coverage criteria are met.  3. When it has  been longer than twelve months since the most recent previous sacroiliac joint injection, the criteria for initial sacroiliac joint injection (see above) must be met.          Patient will need to complete PT, they request it has to be done within the last 6 months. Please see above for medical policy            Jennifer CLIFFORD    [facility]

## 2023-03-23 NOTE — TELEPHONE ENCOUNTER
Called pt and advised per below. Advised per policy would need to repeat PT as last SI was 12/2021. Advised would route for PT referral, patient to call back when has completed 4 sessions for insurance review/PA and scheduling    Brie FORTE, RN Care Coordinator  [facility]  Pain [facility]

## 2023-04-03 ENCOUNTER — THERAPY VISIT (OUTPATIENT)
Dept: PHYSICAL THERAPY | Facility: CLINIC | Age: 58
End: 2023-04-03
Attending: PHYSICAL MEDICINE & REHABILITATION
Payer: COMMERCIAL

## 2023-04-03 DIAGNOSIS — M53.3 SACROILIAC JOINT PAIN: ICD-10-CM

## 2023-04-03 PROCEDURE — 97110 THERAPEUTIC EXERCISES: CPT | Mod: GP | Performed by: PHYSICAL THERAPIST

## 2023-04-03 PROCEDURE — 97161 PT EVAL LOW COMPLEX 20 MIN: CPT | Mod: GP | Performed by: PHYSICAL THERAPIST

## 2023-04-03 NOTE — PROGRESS NOTES
Physical Therapy Initial Evaluation  Subjective:  History of bilateral lumbosacral pain for years secondary to synovial cysts and bilateral foraminal stenosis. Pt referred by MD for physical therapy on 3-23-23      Patient Health History  Arabella Hart being seen for SI PT requirement for steroid injections.     Problem began: 4/3/2016.   Problem occurred: Unsure   Pain is reported as 6/10 on pain scale.  General health as reported by patient is good.  Pertinent medical history includes: menopausal, overweight, osteoarthritis, pain at night/rest, smoking and thyroid problems.     Medical allergies: adhesives.   Surgeries include:  Other. Other surgery history details: Hysterectomy.    Current medications:  Anti-depressants, anti-inflammatory, muscle relaxants and thyroid medication.       Primary job tasks include:  Prolonged sitting, prolonged standing and repetitive tasks.                  Therapist Generated HPI Evaluation         Type of problem:  Lumbar and sacroiliac.    This is a chronic condition.  Condition occurred with:  Other reason.  Where condition occurred: other.  Patient reports pain:  Lumbar spine left, lumbar spine right and SI joint left.  Pain is described as aching and shooting and is constant.  Pain radiates to:  Gluteals left, gluteals right, thigh left and thigh right. Pain is the same all the time.  Since onset symptoms are unchanged.  Associated symptoms:  Loss of motion/stiffness and loss of strength. Symptoms are exacerbated by carrying, lifting, twisting, sitting, standing and lying down (getting out of a car)  and relieved by rest, ice and heat.  Special tests included:  MRI.  Previous treatment includes physical therapy (injections, nerve ablations ). There was moderate improvement following previous treatment.  Restrictions due to condition include:  Working in normal job without restrictions.  Barriers include:  None as reported by patient.                         Objective:  Standing Alignment:          Pelvis deviations alignment: anterior rotation of the left inominate.              Flexibility/Screens:       Lower Extremity:  Decreased left lower extremity flexibility:Piriformis; Hip Flexors and Hamstrings    Decreased right lower extremity flexibility:  Piriformis; Hip Flexors and Hamstrings               Lumbar/SI Evaluation  ROM:    AROM Lumbar:   Flexion:            Moderate loss   Ext:                    Moderate loss    Side Bend:        Left:  Minimal loss     Right:  Minimal loss   Rotation:           Left:  Moderate loss     Right:  Moderate loss   Side Glide:        Left:     Right:         Strength: weak lower abdominals. pain with bridging.   Lumbar Myotomes:  normal            Lumbar DTR's:  normal        Lumbar Dermtomes:  normal                Neural Tension/Mobility:      Left side:SLR or SLR w/DF  negative.     Right side:   SLR w/DF or SLR  negative.   Lumbar Palpation:  Palpation (lumbar): point tenderness L3-L5 spinous processes and bilateral sacral sulci.                                                         General     ROS    Assessment/Plan:    Patient is a 57 year old female with sacral complaints.    Patient has the following significant findings with corresponding treatment plan.                Diagnosis 1:    Pain -  hot/cold therapy, manual therapy, self management, education and home program  Decreased ROM/flexibility - manual therapy, therapeutic exercise, therapeutic activity and home program  Decreased strength - therapeutic exercise, therapeutic activities and home program    Therapy Evaluation Codes:   1) History comprised of:   Personal factors that impact the plan of care:      None.    Comorbidity factors that impact the plan of care are:      Menopausal, Overweight, Pain at night/rest, Smoking, Weakness and thyroid.     Medications impacting care: Anti-depressant, Anti-inflammatory, Muscle relaxant and thyroid  medication.  2) Examination of Body Systems comprised of:   Body structures and functions that impact the plan of care:      Lumbar spine and Sacral illiac joint.   Activity limitations that impact the plan of care are:      Bathing, Dressing, Lifting, Sitting, Standing and Sleeping.  3) Clinical presentation characteristics are:   Stable/Uncomplicated.  4) Decision-Making    Low complexity using standardized patient assessment instrument and/or measureable assessment of functional outcome.  Cumulative Therapy Evaluation is: Low complexity.    Previous and current functional limitations:  (See Goal Flow Sheet for this information)    Short term and Long term goals: (See Goal Flow Sheet for this information)     Communication ability:  Patient appears to be able to clearly communicate and understand verbal and written communication and follow directions correctly.  Treatment Explanation - The following has been discussed with the patient:   RX ordered/plan of care  Anticipated outcomes  Possible risks and side effects  This patient would benefit from PT intervention to resume normal activities.   Rehab potential is good.    Frequency:  1 X week, once daily  Duration:  for 6 weeks  Discharge Plan:  Achieve all LTG.  Independent in home treatment program.  Reach maximal therapeutic benefit.    Please refer to the daily flowsheet for treatment today, total treatment time and time spent performing 1:1 timed codes.

## 2023-04-11 NOTE — TELEPHONE ENCOUNTER
Pt iker for PT 4/4 sessions on 4/24      Will keep encounter open at this time for pt communication and nursing response.    Tonia ROJAS RN Care Coordinator  [facility]'

## 2023-04-18 ENCOUNTER — TRANSFERRED RECORDS (OUTPATIENT)
Dept: HEALTH INFORMATION MANAGEMENT | Facility: CLINIC | Age: 58
End: 2023-04-18
Payer: COMMERCIAL

## 2023-04-24 NOTE — TELEPHONE ENCOUNTER
Pt n/s PT 4/10 and 4/17     Will keep encounter open at this time for pt communication and nursing response.    Tonia ROJAS RN Care Coordinator  [facility]

## 2023-04-27 NOTE — TELEPHONE ENCOUNTER
Pt with 1/4 PT sessions complete    Closing at this time    Tonia ROJAS RN Care Coordinator  [facility]

## 2023-06-01 ENCOUNTER — HEALTH MAINTENANCE LETTER (OUTPATIENT)
Age: 58
End: 2023-06-01

## 2023-07-22 DIAGNOSIS — E06.3 HYPOTHYROIDISM DUE TO HASHIMOTO'S THYROIDITIS: ICD-10-CM

## 2023-07-22 NOTE — LETTER
July 27, 2023      Arabella M Tanner  [address] [place] [address]        Arabella OLSON Kobyvasu      We recently received a refill request for your    levothyroxine (SYNTHROID/LEVOTHROID) 175 MCG tablet   . We have sent in a refill for you to your pharmacy.    Our records show you are due for a follow up visit with your provider.     Please call the clinic at [phone] to schedule as the providers are booking out.      Sincerely,        Shreya Costello/

## 2023-07-24 ENCOUNTER — MYC MEDICAL ADVICE (OUTPATIENT)
Dept: PALLIATIVE MEDICINE | Facility: CLINIC | Age: 58
End: 2023-07-24
Payer: COMMERCIAL

## 2023-07-24 DIAGNOSIS — M79.18 MYOFASCIAL PAIN: Primary | ICD-10-CM

## 2023-07-24 RX ORDER — LEVOTHYROXINE SODIUM 175 UG/1
TABLET ORAL
Qty: 102 TABLET | Refills: 0 | Status: SHIPPED | OUTPATIENT
Start: 2023-07-24 | End: 2023-12-05

## 2023-07-25 ENCOUNTER — LAB (OUTPATIENT)
Dept: LAB | Facility: CLINIC | Age: 58
End: 2023-07-25
Payer: COMMERCIAL

## 2023-07-25 DIAGNOSIS — E06.3 HYPOTHYROIDISM DUE TO HASHIMOTO'S THYROIDITIS: ICD-10-CM

## 2023-07-25 DIAGNOSIS — M79.18 MYOFASCIAL PAIN: ICD-10-CM

## 2023-07-25 LAB
T4 FREE SERPL-MCNC: 1.94 NG/DL (ref 0.9–1.7)
TSH SERPL DL<=0.005 MIU/L-ACNC: <0.01 UIU/ML (ref 0.3–4.2)

## 2023-07-25 PROCEDURE — 36415 COLL VENOUS BLD VENIPUNCTURE: CPT

## 2023-07-25 PROCEDURE — 84439 ASSAY OF FREE THYROXINE: CPT

## 2023-07-25 PROCEDURE — 84443 ASSAY THYROID STIM HORMONE: CPT

## 2023-07-25 NOTE — TELEPHONE ENCOUNTER
Routing for refill and fymann VERONICA Pain Nurse (Caridad Jeronimo MD)13 hours ago (7:53 PM)       Sabrina Montez, I wanted to give you an update on why you have not seen me or why I haven't completed my PT appointments.     I injured my ulnar nerve at work.  I have had a series of cortisone injections, was on a Medrol pack and have an upcoming surgery in about 6 weeks.  I am unable to have surgery within 90 days of an injection.       I did put in a refill for my muscle relaxers.  It doesn't matter which one.  They all work about the same.      I hope you and your family is doing well!     Thank you!  Josiah Hart       You  Josiah Jasso now (9:56 AM)     MADISON Rahman,     Thanks for the update and sorry to hear you have had so much going on! We will pass the message onto Dr Montez and will look forward to seeing you back in the clinic once you are on the mend.     In the meantime it looks like we last filled the tizanidine so we can send that for refill today for you!     Take care,  Tonia ROJAS RN Care Coordinator  [facility]    This MyChart message has not been read.

## 2023-07-25 NOTE — TELEPHONE ENCOUNTER
Received fax request from [facility] pharmacy requesting refill(s) for methocarbamol (ROBAXIN) 500 MG tablet     Last refilled on 07/26/22    Pt last seen on 03/21/23  Next appt scheduled for None    Will facilitate refill.

## 2023-07-27 RX ORDER — TIZANIDINE 2 MG/1
2-4 TABLET ORAL 3 TIMES DAILY PRN
Qty: 90 TABLET | Refills: 1 | Status: SHIPPED | OUTPATIENT
Start: 2023-07-27 | End: 2024-05-21

## 2023-07-27 RX ORDER — METHOCARBAMOL 500 MG/1
500 TABLET, FILM COATED ORAL 4 TIMES DAILY PRN
Qty: 120 TABLET | Refills: 1 | OUTPATIENT
Start: 2023-07-27

## 2023-07-27 NOTE — TELEPHONE ENCOUNTER
Signed Prescriptions:                        Disp   Refills    tiZANidine (ZANAFLEX) 2 MG tablet          90 tab*1        Sig: Take 1-2 tablets (2-4 mg) by mouth 3 times daily as           needed for muscle spasms  Authorizing Provider: GLORIA DIAZ MD  [facility]

## 2023-07-27 NOTE — TELEPHONE ENCOUNTER
Refused Prescriptions:                       Disp   Refills    methocarbamol (ROBAXIN) 500 MG tablet      120 ta*1        Sig: Take 1 tablet (500 mg) by mouth 4 times daily as           needed for muscle spasms  Refused By: GLORIA MONTEZ  Reason for Refusal: OTHER    Refilled tizanidine in a separate encounter.    Gloria Montez MD  [facility]

## 2023-07-31 ENCOUNTER — MYC MEDICAL ADVICE (OUTPATIENT)
Dept: FAMILY MEDICINE | Facility: CLINIC | Age: 58
End: 2023-07-31
Payer: COMMERCIAL

## 2023-08-04 ENCOUNTER — TELEPHONE (OUTPATIENT)
Dept: FAMILY MEDICINE | Facility: CLINIC | Age: 58
End: 2023-08-04

## 2023-08-04 ENCOUNTER — VIRTUAL VISIT (OUTPATIENT)
Dept: FAMILY MEDICINE | Facility: CLINIC | Age: 58
End: 2023-08-04
Payer: COMMERCIAL

## 2023-08-04 DIAGNOSIS — F41.1 GAD (GENERALIZED ANXIETY DISORDER): Primary | ICD-10-CM

## 2023-08-04 PROCEDURE — 99213 OFFICE O/P EST LOW 20 MIN: CPT | Mod: 93 | Performed by: FAMILY MEDICINE

## 2023-08-04 RX ORDER — VENLAFAXINE HYDROCHLORIDE 225 MG/1
225 TABLET, EXTENDED RELEASE ORAL DAILY
Qty: 90 TABLET | Refills: 1 | Status: SHIPPED | OUTPATIENT
Start: 2023-08-04 | End: 2024-03-07

## 2023-08-04 NOTE — TELEPHONE ENCOUNTER
Prior Authorization Retail Medication Request    Medication/Dose: venlafaxine (EFFEXOR-ER) 225 MG 24 hr tablet   ICD code (if different than what is on RX):    Previously Tried and Failed: amitriptyline (ELAVIL) 50 MG tablet   Rationale: Uncontrolled due to recent family events. Will increase effexor for now to help her better cope.     COVERMYMEDS    KEY: [id number]  PATIENT LAST NAME: SOLANGE  : 1965    Pharmacy Information (if different than what is on RX)  Name:    Phone:        Gonzalez STODDARD

## 2023-08-04 NOTE — PROGRESS NOTES
Josiah is a 58 year old who is being evaluated via a billable telephone visit.      What phone number would you like to be contacted at? [phone]  How would you like to obtain your AVS? Bundle Buyshahla    Distant Location (provider location):  Off-site    Assessment & Plan     NAGI (generalized anxiety disorder) - uncontrolled due to recent family events. Will increase effexor for now to help her better cope.   - venlafaxine (EFFEXOR-ER) 225 MG 24 hr tablet; Take 1 tablet (225 mg) by mouth daily    Ely Dozier MD  [facility]   Josiah is a 58 year old, presenting for the following health issues:  Recheck Medication      8/4/2023    12:38 PM   Additional Questions   Roomed by Laura MILLS     From 7/27/23 Solio message ---    Hi Dr. Dozier!     We have been having many health issues in my immediate family. My  had a small stroke 5 weeks ago. He has had numerous tests to figure out what to do with PFO that they found.      Three weeks later, my 27 year old daughter also had a small stroke. She is out in [place] starting her 4th year of medical school. She is still in the process of figuring out why. They are leaning towards autoimmune disorder and they also discovered a small PFO.      I am tapped!  Even though their symptoms resolved, I am numb. It seems like every time we turn around, something disturbing shows up in the results of tests or something abnormal in an imaging tests.      I have gotten to the point that I am emotionally and physically drained. I feel like I am numb to emotions. Several of the Veterans at the home have noticed a difference in me. So have my boss. I don t have any plans of hurting myself or causing my own death. I don t even feel depressed. I just feel like I have a flat affect. Do you think increasing my venlafaxine would help?  I am also having disturbed sleep. Would increasing my amitriptyline would help?  Just reaching out.     Review  "of Systems   Constitutional, HEENT, cardiovascular, pulmonary, gi and gu systems are negative, except as otherwise noted.      Objective    Vitals - Patient Reported  Weight (Patient Reported): 90.7 kg (200 lb)  Height (Patient Reported): 163.8 cm (5' 4.5\")  BMI (Based on Pt Reported Ht/Wt): 33.8    Physical Exam   healthy, alert, and no distress  PSYCH: Alert and oriented times 3; coherent speech, normal   rate and volume, able to articulate logical thoughts, able   to abstract reason, no tangential thoughts, no hallucinations   or delusions  Her affect is normal  RESP: No cough, no audible wheezing, able to talk in full sentences  Remainder of exam unable to be completed due to telephone visits        Phone call duration: 10 minutes      "

## 2023-08-09 NOTE — TELEPHONE ENCOUNTER
Prior Authorization Not Needed per Insurance    Medication: VENLAFAXINE HCL  MG PO TB24  Insurance Company: [employer] - Phone [phone] Fax [phone]  Expected CoPay:      Pharmacy Filling the Rx: [id number] - [place], [place] - [address]  Pharmacy Notified: Yes  Patient Notified: No

## 2023-09-14 ENCOUNTER — OFFICE VISIT (OUTPATIENT)
Dept: URGENT CARE | Facility: URGENT CARE | Age: 58
End: 2023-09-14
Payer: COMMERCIAL

## 2023-09-14 VITALS
TEMPERATURE: 99.2 F | HEART RATE: 101 BPM | RESPIRATION RATE: 16 BRPM | DIASTOLIC BLOOD PRESSURE: 91 MMHG | OXYGEN SATURATION: 97 % | SYSTOLIC BLOOD PRESSURE: 138 MMHG

## 2023-09-14 DIAGNOSIS — L29.9 ITCHING: ICD-10-CM

## 2023-09-14 DIAGNOSIS — T63.441A BEE STING REACTION, ACCIDENTAL OR UNINTENTIONAL, INITIAL ENCOUNTER: Primary | ICD-10-CM

## 2023-09-14 PROCEDURE — 99213 OFFICE O/P EST LOW 20 MIN: CPT | Performed by: FAMILY MEDICINE

## 2023-09-14 RX ORDER — TRIAMCINOLONE ACETONIDE 5 MG/G
CREAM TOPICAL 2 TIMES DAILY
Qty: 30 G | Refills: 0 | Status: SHIPPED | OUTPATIENT
Start: 2023-09-14 | End: 2023-09-28

## 2023-09-14 NOTE — PROGRESS NOTES
URGENT CARE    ASSESSMENT AND PLAN:      ICD-10-CM    1. Bee sting reaction, accidental or unintentional, initial encounter  T63.441A triamcinolone (ARISTOCORT HP) 0.5 % external cream      2. Itching  L29.9 triamcinolone (ARISTOCORT HP) 0.5 % external cream            We discussed etiology of insect bites and the natural course of condition.  She can start incorporating antihistamine, calamine lotion, ice pack, and Ace wrap for symptoms.  Prescription for triamcinolone cream was prescribed to aid with itching symptoms; short-term use of this was discussed due to side effect.  Work note excusing her for the past 3 days of absence was printed for patient.    Follow up with primary care provider with any problems, questions or concerns or if symptoms worsen or fail to improve. Patient verbalized understanding and is agreeable to plan. The patient was discharged ambulatory and in stable condition.    Chief Complaint   Patient presents with    Insect Bites     BEE STING, 3RD TIME THIS YEAR, EACH ONE IS GETTING WORSE EACH TIME, VERY ITCHY AND SWOLLEN, THIS HAPPENED ON TUESDAY MORNING, TRIED A LOT OF THINGS OVER THE COUNTER, SHE NEEDS A NOTE FOR WORK SHE HAS MISSED 3 SHIFTS     SUBJECTIVE:  Arabella Hart is a 58 year old female who presents to the clinic today for evaluation of bee sting on right foot x 2 days ago.  She reports the insect bite is itchy and swelling noted.    Patient denies fever/chills, pain, difficulty breathing, throat/tongue swelling, new meds, pets, foods, soaps, detergents, lotions, or enviornmental contacts.    Past Medical History:   Diagnosis Date    Abnormal Papanicolaou smear of cervix and cervical HPV     colpo done    ASCUS on Pap smear 12/20/06    + HPV    History of colposcopy with cervical biopsy 1/24/07    Atypical squamous metaplasia     ACETAMINOPHEN PO, Take 325-750 mg by mouth every 6 hours as needed  albuterol (PROAIR HFA/PROVENTIL HFA/VENTOLIN HFA) 108 (90 Base) MCG/ACT  inhaler, Inhale 2 puffs into the lungs every 6 hours as needed for wheezing  amitriptyline (ELAVIL) 50 MG tablet, Take 1 tablet (50 mg) by mouth At Bedtime  Fexofenadine HCl (ALLEGRA ALLERGY PO),    gabapentin (NEURONTIN) 300 MG capsule, Take 1 capsule (300 mg) by mouth At Bedtime Take with 600 mg capsule  gabapentin (NEURONTIN) 600 MG tablet, Take 1 tablet (600 mg) by mouth 3 times daily  levothyroxine (SYNTHROID/LEVOTHROID) 175 MCG tablet, Take 2 tablets one day of the week and 1 tablet the rest of the days of the week  meloxicam (MOBIC) 15 MG tablet, Take 1 tablet (15 mg) by mouth daily as needed for moderate pain (4-6)  tiZANidine (ZANAFLEX) 2 MG tablet, Take 1-2 tablets (2-4 mg) by mouth 3 times daily as needed for muscle spasms  venlafaxine (EFFEXOR-ER) 225 MG 24 hr tablet, Take 1 tablet (225 mg) by mouth daily    No current facility-administered medications on file prior to visit.    Social History     Tobacco Use    Smoking status: Some Days     Packs/day: 0.50     Years: 15.00     Pack years: 7.50     Types: Cigarettes    Smokeless tobacco: Never   Substance Use Topics    Alcohol use: Yes     Comment: 1-2 drinks seldom     Allergies   Allergen Reactions    Ibuprofen Itching    Monosodium Glutamate      MSG=sinus headache    Oxycodone        Review of Systems  All systems reviewed and negative except per HPI.    EXAM:   BP (!) 138/91   Pulse 101   Temp 99.2  F (37.3  C)   Resp 16   LMP 08/12/2012   SpO2 97%     Physical Exam  GENERAL: healthy, alert and no distress  MS: Right foot with full range of motion and CWMS intact. DP pulse +2.   SKIN: Insect bite noted on right foot with no warmth or tenderness upon palpation.  No drainage.

## 2023-09-14 NOTE — LETTER
September 14, 2023      Arabella Hart  [address] [place] [facility] [address]        To Whom It May Concern:    Arabella Hart  was seen on 9/14.  Please excuse her absence the past 3 days 9/11 - 9/14/23 due to illness.          Sincerely,        ERNESTINA SHAH CNP

## 2023-09-14 NOTE — PATIENT INSTRUCTIONS
Zyrtec daily and Benadryl at night    Calamine lotion to help with itching    Elevate to help with swelling    Scalpel Size: 15 blade

## 2023-09-15 ENCOUNTER — TELEPHONE (OUTPATIENT)
Dept: PALLIATIVE MEDICINE | Facility: CLINIC | Age: 58
End: 2023-09-15
Payer: COMMERCIAL

## 2023-09-15 NOTE — TELEPHONE ENCOUNTER
Last OV: 3/21/23    Next OV: TBD    TX plan: ZHEN ROJAS RN Care Coordinator  [facility]

## 2023-09-22 NOTE — TELEPHONE ENCOUNTER
Called pt and LM to call back to schedule follow up.      Brie FORTE, RN Care Coordinator  [facility]  Pain [facility]

## 2023-10-05 ENCOUNTER — TRANSFERRED RECORDS (OUTPATIENT)
Dept: HEALTH INFORMATION MANAGEMENT | Facility: CLINIC | Age: 58
End: 2023-10-05
Payer: COMMERCIAL

## 2023-10-10 NOTE — TELEPHONE ENCOUNTER
Transfer plan: Pt to discharge to PCP.     If calls prior to provider departure can schedule appt x1 with Dr Montez. Then thereafter to PCP    Brie FORTE, RN Care Coordinator  [facility]  Pain [facility]

## 2023-10-30 DIAGNOSIS — M54.30 SCIATIC LEG PAIN: ICD-10-CM

## 2023-10-30 RX ORDER — GABAPENTIN 600 MG/1
600 TABLET ORAL 3 TIMES DAILY
Qty: 270 TABLET | Refills: 0 | Status: SHIPPED | OUTPATIENT
Start: 2023-10-30 | End: 2024-05-21

## 2023-11-04 ENCOUNTER — HEALTH MAINTENANCE LETTER (OUTPATIENT)
Age: 58
End: 2023-11-04

## 2023-11-09 ENCOUNTER — TELEPHONE (OUTPATIENT)
Dept: FAMILY MEDICINE | Facility: CLINIC | Age: 58
End: 2023-11-09
Payer: COMMERCIAL

## 2023-11-09 ENCOUNTER — NURSE TRIAGE (OUTPATIENT)
Dept: NURSING | Facility: CLINIC | Age: 58
End: 2023-11-09
Payer: COMMERCIAL

## 2023-11-09 DIAGNOSIS — U07.1 INFECTION DUE TO 2019 NOVEL CORONAVIRUS: Primary | ICD-10-CM

## 2023-11-09 NOTE — TELEPHONE ENCOUNTER
Rn spoke with pt while going through protocol for Paxlovid. Pt is house sitting out of town and does not have access to her inhaler for current symptoms. Pt is requesting a refill sent to the following pharmacy:    [facility] [address] [address]      Note: this is where her Paxlovid was sent to as well.      RN will route to provider for further assistance.       Elaina OLSON RN

## 2023-11-09 NOTE — TELEPHONE ENCOUNTER
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  Patient is interested in treatment with Paxlovid. Patient states that she has a history of arthritis and has concerns about this and is requesting treatment.        COVID Positive/Requesting COVID treatment    Patient is positive for COVID and requesting treatment options.    Date of positive COVID test (PCR or at home)? Home 11/8/2023  Current COVID symptoms: fever or chills, cough, shortness of breath or difficulty breathing, muscle or body aches, headache, sore throat, and congestion or runny nose  Date COVID symptoms began: 11/8/2023    Message should be routed to clinic RN pool. Best phone number to use for call back: [phone].        Reason for Disposition   [1] HIGH RISK patient (e.g., weak immune system, age > 64 years, obesity with BMI 30 or higher, pregnant, chronic lung disease or other chronic medical condition) AND [2] COVID symptoms (e.g., cough, fever)  (Exceptions: Already seen by PCP and no new or worsening symptoms.)     Patient has a history of arthritis.    Additional Information   Negative: SEVERE difficulty breathing (e.g., struggling for each breath, speaks in single words)   Negative: Difficult to awaken or acting confused (e.g., disoriented, slurred speech)   Negative: Bluish (or gray) lips or face now   Negative: Shock suspected (e.g., cold/pale/clammy skin, too weak to stand, low BP, rapid pulse)   Negative: Sounds like a life-threatening emergency to the triager   Negative: SEVERE or constant chest pain or pressure  (Exception: Mild central chest pain, present only when coughing.)   Negative: MODERATE difficulty breathing (e.g., speaks in phrases, SOB even at rest, pulse 100-120)   Negative: [1] Headache AND [2] stiff neck (can't touch chin to chest)   Negative: Oxygen level (e.g., pulse oximetry) 90 percent or lower   Negative: Chest pain or pressure  (Exception: MILD  central chest pain, present only when coughing.)   Negative: [1] Drinking very little AND [2] dehydration suspected (e.g., no urine > 12 hours, very dry mouth, very lightheaded)   Negative: Patient sounds very sick or weak to the triager   Negative: MILD difficulty breathing (e.g., minimal/no SOB at rest, SOB with walking, pulse <100)   Negative: Fever > 103 F (39.4 C)   Negative: [1] Fever > 101 F (38.3 C) AND [2] age > 60 years   Negative: [1] Fever > 100.0 F (37.8 C) AND [2] bedridden (e.g., CVA, chronic illness, recovering from surgery)   Negative: Oxygen level (e.g., pulse oximetry) 91 to 94 percent    Protocols used: Coronavirus (COVID-19) Diagnosed or [id number]

## 2023-11-09 NOTE — TELEPHONE ENCOUNTER
RN COVID TREATMENT VISIT  11/09/23      The patient has been triaged and does not require a higher level of care.    Arabella Hart  58 year old  Current weight? 217 lbs    Has the patient been seen by a primary care provider at an [facility] or [facility] within the past two years? Yes.   Have you been in close proximity to/do you have a known exposure to a person with a confirmed case of influenza? No.     General treatment eligibility:  Date of positive COVID test (PCR or at home)?  11/8/2023    Are you or have you been hospitalized for this COVID-19 infection? No.   Have you received monoclonal antibodies or antiviral treatment for COVID-19 since this positive test? No.   Do you have any of the following conditions that place you at risk of being very sick from COVID-19?   - Age 50 years or older  - Overweight or Obesity (BMI >85th percentile or BMI 25 or higher)  Yes, patient has at least one high risk condition as noted above.     Current COVID symptoms:   - fever or chills  - cough  - shortness of breath or difficulty breathing  - muscle or body aches  - headache  - sore throat  - congestion or runny nose  Yes. Patient has at least one symptom as selected.     How many days since symptoms started? 5 days or less. Established patient, 12 years or older weighing at least 88.2 lbs, who has symptoms that started in the past 5 days, has not been hospitalized nor received treatment already, and is at risk for being very sick from COVID-19.     Treatment eligibility by RN:  Are you currently pregnant or nursing? No  Do you have a clinically significant hypersensitivity to nirmatrelvir or ritonavir, or toxic epidermal necrolysis (TEN) or Hilliard-Clay Syndrome? No  Do you have a history of hepatitis, any hepatic impairment on the Problem List (such as Child-Merino Class C, cirrhosis, fatty liver disease, alcoholic liver disease), or was the last liver lab (hepatic panel, ALT, AST,  ALK Phos, bilirubin) elevated in the past 6 months? No  Do you have any history of severe renal impairment (eGFR < 30mL/min)? No    Is patient eligible to continue? Yes, patient meets all eligibility requirements for the RN COVID treatment (as denoted by all no responses above).     Current Outpatient Medications   Medication Sig Dispense Refill    ACETAMINOPHEN PO Take 325-750 mg by mouth every 6 hours as needed      albuterol (PROAIR HFA/PROVENTIL HFA/VENTOLIN HFA) 108 (90 Base) MCG/ACT inhaler Inhale 2 puffs into the lungs every 6 hours as needed for wheezing 18 g 0    amitriptyline (ELAVIL) 50 MG tablet Take 1 tablet (50 mg) by mouth At Bedtime 90 tablet 3    Fexofenadine HCl (ALLEGRA ALLERGY PO)        gabapentin (NEURONTIN) 300 MG capsule Take 1 capsule (300 mg) by mouth At Bedtime Take with 600 mg capsule 90 capsule 3    gabapentin (NEURONTIN) 600 MG tablet Take 1 tablet (600 mg) by mouth 3 times daily 270 tablet 0    levothyroxine (SYNTHROID/LEVOTHROID) 175 MCG tablet Take 2 tablets one day of the week and 1 tablet the rest of the days of the week 102 tablet 0    meloxicam (MOBIC) 15 MG tablet Take 1 tablet (15 mg) by mouth daily as needed for moderate pain (4-6) 30 tablet 0    tiZANidine (ZANAFLEX) 2 MG tablet Take 1-2 tablets (2-4 mg) by mouth 3 times daily as needed for muscle spasms 90 tablet 1    venlafaxine (EFFEXOR-ER) 225 MG 24 hr tablet Take 1 tablet (225 mg) by mouth daily 90 tablet 1       Medications from List 1 of the standing order (on medications that exclude the use of Paxlovid) that patient is taking: NONE. Is patient taking Faina's Wort? No  Is patient taking Faina's Wort or any meds from List 1? No.   Medications from List 2 of the standing order (on meds that provider needs to adjust) that patient is taking: NONE. Is patient on any of the meds from List 2? No.   Medications from List 3 of standing order (on meds that a RN needs to adjust) that patient is taking: NONE. Is patient on  any meds from List 3? No.     Paxlovid has an approximate 90% reduction in hospitalization. Paxlovid can possibly cause altered sense of taste, diarrhea (loose, watery stools), high blood pressure, muscle aches.     Would patient like a Paxlovid prescription?   Yes.   Lab Results   Component Value Date    GFRESTIMATED >90  Non  GFR Calc   06/07/2017       Was last eGFR reduced? No, eGFR 60 or greater/ No Result on record. Patient can receive the normal renal function dose. Paxlovid Rx sent to [place] pharmacy   Preferred    Temporary change to home medications: None    All medication adjustments (holds, etc) were discussed with the patient and patient was asked to repeat back (teachback) their med adjustment.  Did patient understand med adjustment? No medication adjustments needed.         Reviewed the following instructions with the patient:    Paxlovid (nimatrelvir and ritonavir)    How it works  Two medicines (nirmatrelvir and ritonavir) are taken together. They stop the virus from growing. Less amount of virus is easier for your body to fight.    How to take  Medicine comes in a daily container with both medicine tablets. Take by mouth twice daily (once in the morning, once at night) for 5 days.  The number of tablets to take varies by patient.  Don't chew or break capsules. Swallow whole.    When to take  Take as soon as possible after positive COVID-19 test result, and within 5 days of your first symptoms.    Possible side effects  Can cause altered sense of taste, diarrhea (loose, watery stools), high blood pressure, muscle aches.    Elaina Hayes RN

## 2023-12-05 DIAGNOSIS — E06.3 HYPOTHYROIDISM DUE TO HASHIMOTO'S THYROIDITIS: ICD-10-CM

## 2023-12-05 RX ORDER — LEVOTHYROXINE SODIUM 175 UG/1
TABLET ORAL
Qty: 102 TABLET | Refills: 0 | Status: SHIPPED | OUTPATIENT
Start: 2023-12-05 | End: 2024-03-03

## 2024-01-11 ENCOUNTER — TELEPHONE (OUTPATIENT)
Dept: FAMILY MEDICINE | Facility: CLINIC | Age: 59
End: 2024-01-11
Payer: COMMERCIAL

## 2024-01-11 DIAGNOSIS — M54.30 SCIATIC LEG PAIN: ICD-10-CM

## 2024-01-11 DIAGNOSIS — M47.816 SPONDYLOSIS OF LUMBAR REGION WITHOUT MYELOPATHY OR RADICULOPATHY: ICD-10-CM

## 2024-01-11 RX ORDER — AMITRIPTYLINE HYDROCHLORIDE 50 MG/1
50 TABLET ORAL AT BEDTIME
Qty: 90 TABLET | Refills: 0 | Status: SHIPPED | OUTPATIENT
Start: 2024-01-11 | End: 2024-01-13

## 2024-01-11 NOTE — LETTER
January 11, 2024      Arabella Hart  [address] [place] [address]        Dear Arabella,       According to our records, you are due for a mammogram.       Please call our office to schedule.      Any questions, please let us know.           Sincerely,        Ely Dozier MD/Saeid Barrios CMA

## 2024-01-11 NOTE — TELEPHONE ENCOUNTER
Patient Quality Outreach    Patient is due for the following:   Breast Cancer Screening - Mammogram    Next Steps:   Schedule a office visit for Mammogram    Type of outreach:    Sent letter.    Next Steps:  Reach out within 90 days via Letter.    Max number of attempts reached: No. Will try again in 90 days if patient still on fail list.    Questions for provider review:    None           Saeid Barrios, CASIMIRO  Chart routed to none.

## 2024-01-11 NOTE — LETTER
January 15, 2024      Arabella Arellanorox  [address] [place] [address]        Arabella Hart      We recently received a refill request for your amitriptyline (ELAVIL) 50 MG tablet . We have sent in a refill for you to your pharmacy.    Our records show you are due for a follow up visit with your provider.     Please call the clinic at [phone] to schedule as the providers are booking out.          Sincerely,        Shreya Costello/

## 2024-01-13 DIAGNOSIS — M47.816 SPONDYLOSIS OF LUMBAR REGION WITHOUT MYELOPATHY OR RADICULOPATHY: ICD-10-CM

## 2024-01-13 DIAGNOSIS — M54.30 SCIATIC LEG PAIN: ICD-10-CM

## 2024-01-15 RX ORDER — AMITRIPTYLINE HYDROCHLORIDE 50 MG/1
50 TABLET ORAL AT BEDTIME
Qty: 90 TABLET | Refills: 0 | Status: SHIPPED | OUTPATIENT
Start: 2024-01-15 | End: 2024-04-22

## 2024-03-07 DIAGNOSIS — F41.1 GAD (GENERALIZED ANXIETY DISORDER): ICD-10-CM

## 2024-03-07 RX ORDER — VENLAFAXINE HYDROCHLORIDE 225 MG/1
225 TABLET, EXTENDED RELEASE ORAL DAILY
Qty: 90 TABLET | Refills: 0 | Status: SHIPPED | OUTPATIENT
Start: 2024-03-07 | End: 2024-05-21

## 2024-03-09 ENCOUNTER — MYC REFILL (OUTPATIENT)
Dept: PALLIATIVE MEDICINE | Facility: CLINIC | Age: 59
End: 2024-03-09
Payer: COMMERCIAL

## 2024-03-09 DIAGNOSIS — M79.18 MYOFASCIAL PAIN: ICD-10-CM

## 2024-03-09 RX ORDER — MELOXICAM 15 MG/1
15 TABLET ORAL DAILY PRN
Qty: 30 TABLET | Refills: 0 | Status: CANCELLED | OUTPATIENT
Start: 2024-03-09

## 2024-03-11 NOTE — TELEPHONE ENCOUNTER
Patient last seen 03/2023. Was discharged to PCP with provider departure    Brie FORTE, RN Care Coordinator  [facility]  Pain [facility]

## 2024-03-15 DIAGNOSIS — M79.18 MYOFASCIAL PAIN: ICD-10-CM

## 2024-03-15 RX ORDER — METHOCARBAMOL 500 MG/1
500 TABLET, FILM COATED ORAL 4 TIMES DAILY PRN
Qty: 120 TABLET | Refills: 0 | Status: SHIPPED | OUTPATIENT
Start: 2024-03-15 | End: 2024-05-21

## 2024-03-15 NOTE — TELEPHONE ENCOUNTER
Routing refill request to provider for review/approval because:  Drug not active on patient's medication list    REBA GarciaN, RN

## 2024-03-15 NOTE — TELEPHONE ENCOUNTER
Pharmacy calling to follow up on refill request for methocarbamol 500mg take 1 tab 4 times daily prn muscle spasms. Informed pharmacy we never rec'd request.     Not on current med list, last refilled in 2022.    Anita Patterson,

## 2024-04-22 DIAGNOSIS — M54.30 SCIATIC LEG PAIN: ICD-10-CM

## 2024-04-22 DIAGNOSIS — M47.816 SPONDYLOSIS OF LUMBAR REGION WITHOUT MYELOPATHY OR RADICULOPATHY: ICD-10-CM

## 2024-04-22 RX ORDER — AMITRIPTYLINE HYDROCHLORIDE 50 MG/1
50 TABLET ORAL AT BEDTIME
Qty: 90 TABLET | Refills: 0 | Status: SHIPPED | OUTPATIENT
Start: 2024-04-22 | End: 2024-05-21

## 2024-05-18 NOTE — TELEPHONE ENCOUNTER
Med Rec completed per patient   Allergies reviewed  No ORAL antibiotics in last 30 days    Patient is not taking anticoagulants      Per mychart response:          I did request this. It really helped a lot. I m going to schedule an the MRI. A few days after I stopped taking it, my knee swelled right back up.       Please advise  Graeme Whittington RN, BSN

## 2024-05-20 ENCOUNTER — TRANSFERRED RECORDS (OUTPATIENT)
Dept: HEALTH INFORMATION MANAGEMENT | Facility: CLINIC | Age: 59
End: 2024-05-20
Payer: COMMERCIAL

## 2024-05-21 ENCOUNTER — OFFICE VISIT (OUTPATIENT)
Dept: FAMILY MEDICINE | Facility: CLINIC | Age: 59
End: 2024-05-21
Payer: COMMERCIAL

## 2024-05-21 VITALS
BODY MASS INDEX: 36.65 KG/M2 | OXYGEN SATURATION: 98 % | TEMPERATURE: 98.4 F | HEIGHT: 65 IN | WEIGHT: 220 LBS | HEART RATE: 103 BPM | SYSTOLIC BLOOD PRESSURE: 146 MMHG | RESPIRATION RATE: 16 BRPM | DIASTOLIC BLOOD PRESSURE: 78 MMHG

## 2024-05-21 DIAGNOSIS — E66.01 CLASS 2 SEVERE OBESITY DUE TO EXCESS CALORIES WITH SERIOUS COMORBIDITY AND BODY MASS INDEX (BMI) OF 37.0 TO 37.9 IN ADULT (H): ICD-10-CM

## 2024-05-21 DIAGNOSIS — R73.03 PREDIABETES: ICD-10-CM

## 2024-05-21 DIAGNOSIS — R00.0 TACHYCARDIA: Primary | ICD-10-CM

## 2024-05-21 DIAGNOSIS — R03.0 ELEVATED BLOOD PRESSURE READING WITHOUT DIAGNOSIS OF HYPERTENSION: ICD-10-CM

## 2024-05-21 DIAGNOSIS — M79.18 MYOFASCIAL PAIN: ICD-10-CM

## 2024-05-21 DIAGNOSIS — M54.30 SCIATIC LEG PAIN: ICD-10-CM

## 2024-05-21 DIAGNOSIS — E66.812 CLASS 2 SEVERE OBESITY DUE TO EXCESS CALORIES WITH SERIOUS COMORBIDITY AND BODY MASS INDEX (BMI) OF 37.0 TO 37.9 IN ADULT (H): ICD-10-CM

## 2024-05-21 DIAGNOSIS — M47.816 SPONDYLOSIS OF LUMBAR REGION WITHOUT MYELOPATHY OR RADICULOPATHY: ICD-10-CM

## 2024-05-21 DIAGNOSIS — F41.1 GAD (GENERALIZED ANXIETY DISORDER): ICD-10-CM

## 2024-05-21 DIAGNOSIS — M54.16 LUMBAR RADICULOPATHY: ICD-10-CM

## 2024-05-21 DIAGNOSIS — E06.3 HYPOTHYROIDISM DUE TO HASHIMOTO'S THYROIDITIS: ICD-10-CM

## 2024-05-21 DIAGNOSIS — Z13.220 LIPID SCREENING: ICD-10-CM

## 2024-05-21 LAB
ALBUMIN SERPL BCG-MCNC: 4.7 G/DL (ref 3.5–5.2)
ALP SERPL-CCNC: 120 U/L (ref 40–150)
ALT SERPL W P-5'-P-CCNC: 34 U/L (ref 0–50)
ANION GAP SERPL CALCULATED.3IONS-SCNC: 12 MMOL/L (ref 7–15)
AST SERPL W P-5'-P-CCNC: 21 U/L (ref 0–45)
BILIRUB SERPL-MCNC: 0.2 MG/DL
BUN SERPL-MCNC: 15.2 MG/DL (ref 6–20)
CALCIUM SERPL-MCNC: 9.9 MG/DL (ref 8.6–10)
CHLORIDE SERPL-SCNC: 106 MMOL/L (ref 98–107)
CHOLEST SERPL-MCNC: 226 MG/DL
CREAT SERPL-MCNC: 0.57 MG/DL (ref 0.51–0.95)
DEPRECATED HCO3 PLAS-SCNC: 22 MMOL/L (ref 22–29)
EGFRCR SERPLBLD CKD-EPI 2021: >90 ML/MIN/1.73M2
FASTING STATUS PATIENT QL REPORTED: NO
FASTING STATUS PATIENT QL REPORTED: NO
GLUCOSE SERPL-MCNC: 129 MG/DL (ref 70–99)
HBA1C MFR BLD: 6.2 % (ref 0–5.6)
HDLC SERPL-MCNC: 47 MG/DL
LDLC SERPL CALC-MCNC: 161 MG/DL
NONHDLC SERPL-MCNC: 179 MG/DL
POTASSIUM SERPL-SCNC: 3.9 MMOL/L (ref 3.4–5.3)
PROT SERPL-MCNC: 7.3 G/DL (ref 6.4–8.3)
SODIUM SERPL-SCNC: 140 MMOL/L (ref 135–145)
T4 FREE SERPL-MCNC: 1.53 NG/DL (ref 0.9–1.7)
TRIGL SERPL-MCNC: 88 MG/DL
TSH SERPL DL<=0.005 MIU/L-ACNC: 0.03 UIU/ML (ref 0.3–4.2)

## 2024-05-21 PROCEDURE — 80061 LIPID PANEL: CPT | Performed by: FAMILY MEDICINE

## 2024-05-21 PROCEDURE — 36415 COLL VENOUS BLD VENIPUNCTURE: CPT | Performed by: FAMILY MEDICINE

## 2024-05-21 PROCEDURE — 99214 OFFICE O/P EST MOD 30 MIN: CPT | Performed by: FAMILY MEDICINE

## 2024-05-21 PROCEDURE — 84439 ASSAY OF FREE THYROXINE: CPT | Performed by: FAMILY MEDICINE

## 2024-05-21 PROCEDURE — 84443 ASSAY THYROID STIM HORMONE: CPT | Performed by: FAMILY MEDICINE

## 2024-05-21 PROCEDURE — 80053 COMPREHEN METABOLIC PANEL: CPT | Performed by: FAMILY MEDICINE

## 2024-05-21 PROCEDURE — 83036 HEMOGLOBIN GLYCOSYLATED A1C: CPT | Performed by: FAMILY MEDICINE

## 2024-05-21 RX ORDER — VENLAFAXINE HYDROCHLORIDE 225 MG/1
225 TABLET, EXTENDED RELEASE ORAL DAILY
Qty: 90 TABLET | Refills: 0 | Status: SHIPPED | OUTPATIENT
Start: 2024-05-21 | End: 2024-09-10

## 2024-05-21 RX ORDER — AMITRIPTYLINE HYDROCHLORIDE 50 MG/1
50 TABLET ORAL AT BEDTIME
Qty: 90 TABLET | Refills: 0 | Status: SHIPPED | OUTPATIENT
Start: 2024-05-21 | End: 2024-09-24

## 2024-05-21 RX ORDER — LEVOTHYROXINE SODIUM 175 UG/1
TABLET ORAL
Qty: 102 TABLET | Refills: 0 | Status: SHIPPED | OUTPATIENT
Start: 2024-05-21 | End: 2024-09-10

## 2024-05-21 RX ORDER — METHOCARBAMOL 500 MG/1
500 TABLET, FILM COATED ORAL 4 TIMES DAILY PRN
Qty: 120 TABLET | Refills: 0 | Status: SHIPPED | OUTPATIENT
Start: 2024-05-21 | End: 2024-07-07

## 2024-05-21 RX ORDER — GABAPENTIN 600 MG/1
600 TABLET ORAL 3 TIMES DAILY
Qty: 270 TABLET | Refills: 0 | Status: SHIPPED | OUTPATIENT
Start: 2024-05-21 | End: 2024-09-10

## 2024-05-21 NOTE — PROGRESS NOTES
Assessment & Plan     Tachycardia - twice weekly episodes. Will typically not be doing anything exertional during these events. Has been under more stress lately - family, work. Will develop some SOB with these events but no chest pain.   - Adult Cardiac Event Monitor; Future    Lipid screening  - Lipid panel reflex to direct LDL Non-fasting; Future  - Comprehensive metabolic panel (BMP + Alb, Alk Phos, ALT, AST, Total. Bili, TP); Future  - Lipid panel reflex to direct LDL Non-fasting  - Comprehensive metabolic panel (BMP + Alb, Alk Phos, ALT, AST, Total. Bili, TP)    Prediabetes  - Hemoglobin A1c; Future  - Hemoglobin A1c    Hypothyroidism due to Hashimoto's thyroiditis - has previously had elevated T4 level. Needs surveillance labs.   - TSH; Future  - T4, free; Future  - levothyroxine (SYNTHROID/LEVOTHROID) 175 MCG tablet; Take 2 tablets one day of the week and 1 tablet the rest of the days of the week  - TSH  - T4, free    Class 2 severe obesity due to excess calories with serious comorbidity and body mass index (BMI) of 37.0 to 37.9 in adult (H)    Sciatic leg pain - occasional issues, would like pain clinic consult again.   - amitriptyline (ELAVIL) 50 MG tablet; Take 1 tablet (50 mg) by mouth at bedtime  - gabapentin (NEURONTIN) 600 MG tablet; Take 1 tablet (600 mg) by mouth 3 times daily    Spondylosis of lumbar region without myelopathy or radiculopathy   - amitriptyline (ELAVIL) 50 MG tablet; Take 1 tablet (50 mg) by mouth at bedtime    Myofascial pain  - methocarbamol (ROBAXIN) 500 MG tablet; Take 1 tablet (500 mg) by mouth 4 times daily as needed for muscle spasms    NAGI (generalized anxiety disorder) - stable, refills  - venlafaxine (EFFEXOR-ER) 225 MG 24 hr tablet; Take 1 tablet (225 mg) by mouth daily    Lumbar radiculopathy  - Pain Management  Referral; Future    Elevated blood pressure reading without diagnosis of hypertension - reviewed risks of continued elevated BP. She would like to  "monitor for now. Can consider BB for tachy and BP if indicated.       Nicotine/Tobacco Cessation  She reports that she has been smoking cigarettes. She has a 7.5 pack-year smoking history. She has never used smokeless tobacco.  Nicotine/Tobacco Cessation Plan  Information offered: Patient not interested at this time      BMI  Estimated body mass index is 37.18 kg/m  as calculated from the following:    Height as of this encounter: 1.638 m (5' 4.5\").    Weight as of this encounter: 99.8 kg (220 lb).         Claudia Rahman is a 58 year old, presenting for the following health issues:     Follow Up and Medication Refill (Myofascial pain medications)        5/21/2024    10:55 AM   Additional Questions   Roomed by Jennifer WALKER     History of Present Illness       Reason for visit:  Medication review    She eats 2-3 servings of fruits and vegetables daily.She consumes 0 sweetened beverage(s) daily.She exercises with enough effort to increase her heart rate 10 to 19 minutes per day.  She exercises with enough effort to increase her heart rate 3 or less days per week.   She is taking medications regularly.         Anxiety   How are you doing with your anxiety since your last visit? No change  Are you having other symptoms that might be associated with anxiety? Yes:  rapid heart rate  Have you had a significant life event? Health Concerns and OTHER: family health concern    Are you feeling depressed? No  Do you have any concerns with your use of alcohol or other drugs? No    Social History     Tobacco Use    Smoking status: Some Days     Current packs/day: 0.50     Average packs/day: 0.5 packs/day for 15.0 years (7.5 ttl pk-yrs)     Types: Cigarettes    Smokeless tobacco: Never   Vaping Use    Vaping status: Never Used   Substance Use Topics    Alcohol use: Yes     Comment: 1-2 drinks seldom    Drug use: No         3/8/2021     8:17 AM 10/18/2022     4:44 PM 7/31/2023    10:52 AM   NAGI-7 SCORE   Total Score  3 (minimal " "anxiety) 9 (mild anxiety)   Total Score 1 3 9         8/21/2019     9:09 AM 3/8/2021     8:17 AM 10/18/2022     4:44 PM   PHQ   PHQ-9 Total Score 2 2 8   Q9: Thoughts of better off dead/self-harm past 2 weeks Not at all Not at all Not at all           Review of Systems  Constitutional, neuro, ENT, endocrine, pulmonary, cardiac, gastrointestinal, genitourinary, musculoskeletal, integument and psychiatric systems are negative, except as otherwise noted.      Objective    BP (!) 146/78 (Cuff Size: Adult Large)   Pulse 103   Temp 98.4  F (36.9  C) (Oral)   Resp 16   Ht 1.638 m (5' 4.5\")   Wt 99.8 kg (220 lb)   LMP 08/12/2012   SpO2 98%   BMI 37.18 kg/m    Body mass index is 37.18 kg/m .  Physical Exam   GENERAL: alert and no distress  EYES: Eyes grossly normal to inspection, PERRL and conjunctivae and sclerae normal  HENT: ear canals and TM's normal, nose and mouth without ulcers or lesions  NECK: no adenopathy, no asymmetry, masses, or scars  RESP: lungs clear to auscultation - no rales, rhonchi or wheezes  BREAST: normal without masses, tenderness or nipple discharge and no palpable axillary masses or adenopathy  CV: regular rate and rhythm, normal S1 S2, no S3 or S4, no murmur, click or rub, no peripheral edema  ABDOMEN: soft, nontender, no hepatosplenomegaly, no masses and bowel sounds normal  MS: no gross musculoskeletal defects noted, no edema  SKIN: no suspicious lesions or rashes  NEURO: Normal strength and tone, mentation intact and speech normal  PSYCH: mentation appears normal, affect normal/bright          Signed Electronically by: Ely Dozier MD    "

## 2024-05-24 ENCOUNTER — TELEPHONE (OUTPATIENT)
Dept: FAMILY MEDICINE | Facility: CLINIC | Age: 59
End: 2024-05-24
Payer: COMMERCIAL

## 2024-05-24 DIAGNOSIS — R00.0 TACHYCARDIA: Primary | ICD-10-CM

## 2024-05-24 PROCEDURE — 93246 EXT ECG>7D<15D RECORDING: CPT | Performed by: FAMILY MEDICINE

## 2024-05-24 NOTE — TELEPHONE ENCOUNTER
S-(situation): cardiopulmonary calls to clarify cardiac event monitoring order     B-(background): OV 5/21/24, cardiac event monitoring ordered      A-(assessment): In comment of order it says ziopatch. Cardiopulmonary calling to clarify if neeing cardiac event monitor (snap shots of heart rhythm) or ziopatch (continuous heart monitoring). If wanting ziopatch, needs separate ziopatch order and ziopatch can either be sent out to patient or applied to patient in clinic.     R-(recommendations): Please clarify what type of cardiac monitoring is needed prior to patient appointment on 5/28/24 at 1pm.     Call back cardiopulmonary and update at: [phone] OK to leave message on provider order.     Dina OSBORN

## 2024-05-28 NOTE — TELEPHONE ENCOUNTER
Left message with cardiopulmonary that ziopatch order was placed. Ziopatch will reach out to patient and patient is aware this will be happening.  Anita Pattreson,

## 2024-05-31 ENCOUNTER — ALLIED HEALTH/NURSE VISIT (OUTPATIENT)
Dept: FAMILY MEDICINE | Facility: CLINIC | Age: 59
End: 2024-05-31
Payer: COMMERCIAL

## 2024-05-31 DIAGNOSIS — M54.30 SCIATIC LEG PAIN: ICD-10-CM

## 2024-05-31 DIAGNOSIS — R00.0 TACHYCARDIA: Primary | ICD-10-CM

## 2024-05-31 PROCEDURE — 93246 EXT ECG>7D<15D RECORDING: CPT | Performed by: FAMILY MEDICINE

## 2024-05-31 PROCEDURE — 99207 PR NO CHARGE NURSE ONLY: CPT | Performed by: FAMILY MEDICINE

## 2024-05-31 RX ORDER — GABAPENTIN 300 MG/1
300 CAPSULE ORAL AT BEDTIME
Qty: 90 CAPSULE | Refills: 3 | Status: SHIPPED | OUTPATIENT
Start: 2024-05-31

## 2024-05-31 NOTE — PROGRESS NOTES
Arabella Hart arrived here on 5/31/2024 1:53 PM for 8-14 Days  Zio monitor placement per ordering provider Dr. Dozier for the diagnosis Tachycardia.  Patient s skin was prepped per protocol.  Zio monitor was placed.  Instructions were reviewed with and given to the patient.  Patient verbalized understanding of wear, troubleshooting and monitor return instructions.   Serial Number - [id number]  Shauna NUNEZ RN

## 2024-05-31 NOTE — TELEPHONE ENCOUNTER
Pt states taking 300 mg at bedtime along with the 600 mg dose.    Rx pended for review.    Shauna NUNEZ RN

## 2024-06-23 PROCEDURE — 93248 EXT ECG>7D<15D REV&INTERPJ: CPT | Performed by: INTERNAL MEDICINE

## 2024-06-24 ENCOUNTER — TELEPHONE (OUTPATIENT)
Dept: FAMILY MEDICINE | Facility: CLINIC | Age: 59
End: 2024-06-24
Payer: COMMERCIAL

## 2024-06-24 DIAGNOSIS — R00.0 SINUS TACHYCARDIA: Primary | ICD-10-CM

## 2024-06-24 RX ORDER — METOPROLOL SUCCINATE 25 MG/1
25 TABLET, EXTENDED RELEASE ORAL DAILY
Qty: 90 TABLET | Refills: 3 | Status: SHIPPED | OUTPATIENT
Start: 2024-06-24

## 2024-06-24 NOTE — TELEPHONE ENCOUNTER
New Medication Request        What medication are you requesting?: Beta - blocker medication     Reason for medication request: Per pcp recommendation based on their lab results and suggestion, pt agrees on provider's suggestion and would like to start     Have you taken this medication before?: No    Controlled Substance Agreement on file:   CSA -- Patient Level:    CSA: None found at the patient level.         Patient offered an appointment? No    Preferred Pharmacy:   [id number] [place], [place] - [address]  [address] [place] [address]  Phone: [phone] Fax: [phone]        Could we send this information to you in [employer] or would you prefer to receive a phone call?:   No preference   Okay to leave a detailed message?: Yes at Cell number on file:    Telephone Information:   Mobile [phone]

## 2024-06-26 ENCOUNTER — MYC MEDICAL ADVICE (OUTPATIENT)
Dept: FAMILY MEDICINE | Facility: CLINIC | Age: 59
End: 2024-06-26

## 2024-06-27 ENCOUNTER — MYC MEDICAL ADVICE (OUTPATIENT)
Dept: FAMILY MEDICINE | Facility: CLINIC | Age: 59
End: 2024-06-27
Payer: COMMERCIAL

## 2024-07-02 NOTE — TELEPHONE ENCOUNTER
If I am following correctly, initially she didn't like how she felt on the full dose but then she began feeling better on the full dose. Tried 1/2 dose and had rebound tachy?    If she was feeling well on full dose, she should continue at that dose.     I think everything was a bit delayed because of the weekend. Glad she is feeling better.

## 2024-07-02 NOTE — TELEPHONE ENCOUNTER
Dr. Dozier see patient last two messages, please inform if any additional recommendations.     Thank you,  IRENA Joseph  [facility]

## 2024-07-07 DIAGNOSIS — M79.18 MYOFASCIAL PAIN: ICD-10-CM

## 2024-07-08 RX ORDER — METHOCARBAMOL 500 MG/1
500 TABLET, FILM COATED ORAL 4 TIMES DAILY PRN
Qty: 120 TABLET | Refills: 0 | Status: SHIPPED | OUTPATIENT
Start: 2024-07-08

## 2024-07-29 NOTE — PROGRESS NOTES
ASSESSMENT & PLAN    Josiah was seen today for pain.    Diagnoses and all orders for this visit:    Chondromalacia of patellofemoral joint, right  -     XR Knee Standing AP [place] Bilat Lat Right; Future  -     Large Joint Injection/Arthocentesis: R knee joint  -     Physical Therapy  Referral; Future    Degenerative tear of medial meniscus of right knee  -     Large Joint Injection/Arthocentesis: R knee joint  -     Physical Therapy  Referral; Future      This issue is chronic and Worsening. Josiah presents for clinic today to discuss her chronic right knee pain.  She was seen back in 2019 by Dr. Epps where an MRI showed chondromalacia of the patella and degenerative meniscal tears.  She had a corticosteroid injection at that time that provided good relief for many years.  More recently she reports a return of her pain that has progressively worsened over the past several months.  On examination today she has tenderness palpation around the patella and pain with patellar grind, as well as pain with Caryn's testing.  We discussed the treatment options including repeated corticosteroid injections, physical therapy, and surgical intervention.  Given the patient had such good relief with a CSI, it would be reasonable to repeat this today and the patient was amenable to proceeding with this.  We also discussed the role of physical therapy to help strengthen the muscles around the knee and improving function of the knee long-term, the patient wished to proceed with this as well.  We determined the following plan:  - CSI to the right knee performed today under ultrasound guidance, see procedure note below for details  - Physical therapy referral placed today  - She can continue use over-the-counter pain medicines, ice, heat as needed  - She can follow-up in our clinic as needed      Chaka Mendez DO  [facility]    SUBJECTIVE- Interim History July 29,  "2024    Chief Complaint   Patient presents with    Right Knee - Pain       Arabella Hart is a 59 year old female who is seen in f/u up for right knee pain. Patient's last visit was on 9/12/19 with Jax Epps M.D and she had a cortisone injection at that time. Patient reports great relief from the injection. She states pain returned 4-5 months ago and is progressively getting worse. She denies new injury injury or trauma. Pain is located in right lateral knee.     Worsened by: stairs, difficult to find a comfortable position at night, walking, weighted blanket on top of knee, driving  Better with: nothing  Treatments tried: corticosteroid injection (most recent date: 9/12/19) that provided  4.5 years(s) of relief, ice, Aleve 440mg BID, Tylenol 3,000-4,000 per day, Diclofenac gel  Associated symptoms:  swelling    The patient is seen by themselves.    Orthopedic/Surgical history: YES - h/o right knee OA  Social History/Occupation: nurse      REVIEW OF SYSTEMS:  Review of Systems    OBJECTIVE:  /76   Ht 1.638 m (5' 4.5\")   Wt 99.8 kg (220 lb)   LMP 08/12/2012   BMI 37.18 kg/m     General: healthy, alert and in no distress  Skin: no suspicious lesions or rash.  CV: distal perfusion intact   Resp: normal respiratory effort without conversational dyspnea   Psych: normal mood and affect  Gait: NORMAL  Neuro: Normal light sensory exam of RL extremity     Right Knee exam  Gait: Normal  Alignment:  [x] Normal  [] Anatomic valgus  [] Anatomic varus  Inspection: [x] Normal  [] Ecchymosis present []Other   Palpation:  Joint Tenderness: [] No Tenderness  [x] MJL [] LJL [] MCL Margin [] LCL Margin [] Pes Anserine [] Distal Quadricep  [] Other   Peripatellar Tenderness: [] None [x] Lateral pole [x] Medial pole [x] Superior pole [] Inferior pole    Patellar tendon pain: [x] None [] Present    Patellar apprehension: [x] None [] Present    Patellar motion: [x] Normal [] Abnormal  Crepitus: [x] None [] Present  Effusion: " [x] None [] Trace [] 1+ [] 2+ [] 3+  Range of motion:  Flexion: 135, Extension: 0  Strength:  [x] Full in all planes, including intact extensor mechanism [] Limited as described  Neurologic: Normal sensation   Vascular: Normal pulses   Special tests:   Lachman: Negative  Anterior Drawer: Negative  Sag/quad Activation: NP  Posterior Drawer: Negative  Valgus Stress: Negative at 0/30  Varus Stress: Negative at 0/30  Caryn's: painful   Thessaly: NP     Other notable findings/comments: None      RADIOLOGY:  Final results and radiologist's interpretation, available in the [facility] health record.  Images were reviewed with the patient in the office today.  My personal interpretation of the performed imaging: Mild joint space narrowing medial compartment bilateral knees.  No acute bony abnormalities.  MRI right knee from 9/4/2018 shows:  IMPRESSION:  1. Degenerative tearing of the posterior horn medial meniscus.  2. Patellofemoral chondromalacia. Mild lateral patellar subluxation.  3. Synovitis and moderate-prominent effusion. Loose body cannot be  excluded.  4. Minimal Baker's cyst.  5. Moderate semimembranosus tendinosis    Large Joint Injection/Arthocentesis: R knee joint    Date/Time: 7/30/2024 11:20 AM    Performed by: Chaka Mendez DO  Authorized by: Chaka Mendez DO    Indications:  Pain and osteoarthritis  Needle Size:  22 G  Guidance: ultrasound    Approach:  Anterolateral  Location:  Knee      Medications:  6 mg betamethasone acet & sod phos 6 (3-3) MG/ML; 5 mL lidocaine 1 %; 2 mL ROPivacaine 5 MG/ML  Medications comment:  1ml of 8.4% Sodium Bicarbonate solution was used to buffer the local numbing agent for today's injection    Outcome:  Tolerated well, no immediate complications  Procedure discussed: discussed risks, benefits, and alternatives    Consent Given by:  Patient  Timeout: timeout called immediately prior to procedure    Prep: patient was prepped and draped in usual sterile fashion     Ultrasound  was used to ensure safe and accurate needle placement and injection. Ultrasound images of the procedure were permanently stored.

## 2024-07-30 ENCOUNTER — OFFICE VISIT (OUTPATIENT)
Dept: ORTHOPEDICS | Facility: CLINIC | Age: 59
End: 2024-07-30
Payer: COMMERCIAL

## 2024-07-30 ENCOUNTER — ANCILLARY PROCEDURE (OUTPATIENT)
Dept: GENERAL RADIOLOGY | Facility: CLINIC | Age: 59
End: 2024-07-30
Attending: STUDENT IN AN ORGANIZED HEALTH CARE EDUCATION/TRAINING PROGRAM
Payer: COMMERCIAL

## 2024-07-30 VITALS
BODY MASS INDEX: 36.65 KG/M2 | DIASTOLIC BLOOD PRESSURE: 76 MMHG | WEIGHT: 220 LBS | SYSTOLIC BLOOD PRESSURE: 132 MMHG | HEIGHT: 65 IN

## 2024-07-30 DIAGNOSIS — M25.561 CHRONIC PAIN OF RIGHT KNEE: ICD-10-CM

## 2024-07-30 DIAGNOSIS — M23.203 DEGENERATIVE TEAR OF MEDIAL MENISCUS OF RIGHT KNEE: ICD-10-CM

## 2024-07-30 DIAGNOSIS — G89.29 CHRONIC PAIN OF RIGHT KNEE: ICD-10-CM

## 2024-07-30 DIAGNOSIS — M22.41 CHONDROMALACIA OF PATELLOFEMORAL JOINT, RIGHT: Primary | ICD-10-CM

## 2024-07-30 PROCEDURE — 73562 X-RAY EXAM OF KNEE 3: CPT | Mod: TC | Performed by: RADIOLOGY

## 2024-07-30 PROCEDURE — 99203 OFFICE O/P NEW LOW 30 MIN: CPT | Mod: 25 | Performed by: STUDENT IN AN ORGANIZED HEALTH CARE EDUCATION/TRAINING PROGRAM

## 2024-07-30 PROCEDURE — 73560 X-RAY EXAM OF KNEE 1 OR 2: CPT | Mod: TC | Performed by: RADIOLOGY

## 2024-07-30 PROCEDURE — 20611 DRAIN/INJ JOINT/BURSA W/US: CPT | Mod: RT | Performed by: STUDENT IN AN ORGANIZED HEALTH CARE EDUCATION/TRAINING PROGRAM

## 2024-07-30 RX ORDER — LIDOCAINE HYDROCHLORIDE 10 MG/ML
5 INJECTION, SOLUTION INFILTRATION; PERINEURAL
Status: SHIPPED | OUTPATIENT
Start: 2024-07-30

## 2024-07-30 RX ORDER — BETAMETHASONE SODIUM PHOSPHATE AND BETAMETHASONE ACETATE 3; 3 MG/ML; MG/ML
6 INJECTION, SUSPENSION INTRA-ARTICULAR; INTRALESIONAL; INTRAMUSCULAR; SOFT TISSUE
Status: SHIPPED | OUTPATIENT
Start: 2024-07-30

## 2024-07-30 RX ORDER — ROPIVACAINE HYDROCHLORIDE 5 MG/ML
2 INJECTION, SOLUTION EPIDURAL; INFILTRATION; PERINEURAL
Status: SHIPPED | OUTPATIENT
Start: 2024-07-30

## 2024-07-30 RX ADMIN — BETAMETHASONE SODIUM PHOSPHATE AND BETAMETHASONE ACETATE 6 MG: 3; 3 INJECTION, SUSPENSION INTRA-ARTICULAR; INTRALESIONAL; INTRAMUSCULAR; SOFT TISSUE at 11:20

## 2024-07-30 RX ADMIN — ROPIVACAINE HYDROCHLORIDE 2 ML: 5 INJECTION, SOLUTION EPIDURAL; INFILTRATION; PERINEURAL at 11:20

## 2024-07-30 RX ADMIN — LIDOCAINE HYDROCHLORIDE 5 ML: 10 INJECTION, SOLUTION INFILTRATION; PERINEURAL at 11:20

## 2024-07-30 NOTE — LETTER
7/30/2024      Arabella Hart  [address] [place] [address]      Dear Colleague,    Thank you for referring your patient, Arabella Hart, to the [facility] [place]. Please see a copy of my visit note below.    ASSESSMENT & PLAN    Josiah was seen today for pain.    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 Josiah presents for clinic today to discuss her chronic right knee pain.  She was seen back in 2019 by Dr. Epps where an MRI showed chondromalacia of the patella and degenerative meniscal tears.  She had a corticosteroid injection at that time that provided good relief for many years.  More recently she reports a return of her pain that has progressively worsened over the past several months.  On examination today she has tenderness palpation around the patella and pain with patellar grind, as well as pain with Caryn's testing.  We discussed the treatment options including repeated corticosteroid injections, physical therapy, and surgical intervention.  Given the patient had such good relief with a CSI, it would be reasonable to repeat this today and the patient was amenable to proceeding with this.  We also discussed the role of physical therapy to help strengthen the muscles around the knee and improving function of the knee long-term, the patient wished to proceed with this as well.  We determined the following plan:  - CSI to the right knee performed today under ultrasound guidance, see procedure note below for details  - Physical therapy  "referral placed today  - She can continue use over-the-counter pain medicines, ice, heat as needed  - She can follow-up in our clinic as needed      DO WESLEY Marquez [facility] [place]    SUBJECTIVE- Interim History July 29, 2024    Chief Complaint   Patient presents with     Right Knee - Pain       Arabella Hart is a 59 year old female who is seen in f/u up for right knee pain. Patient's last visit was on 9/12/19 with Jax Epps M.D and she had a cortisone injection at that time. Patient reports great relief from the injection. She states pain returned 4-5 months ago and is progressively getting worse. She denies new injury injury or trauma. Pain is located in right lateral knee.     Worsened by: stairs, difficult to find a comfortable position at night, walking, weighted blanket on top of knee, driving  Better with: nothing  Treatments tried: corticosteroid injection (most recent date: 9/12/19) that provided  4.5 years(s) of relief, ice, Aleve 440mg BID, Tylenol 3,000-4,000 per day, Diclofenac gel  Associated symptoms:  swelling    The patient is seen by themselves.    Orthopedic/Surgical history: YES - h/o right knee OA  Social History/Occupation: nurse      REVIEW OF SYSTEMS:  Review of Systems    OBJECTIVE:  /76   Ht 1.638 m (5' 4.5\")   Wt 99.8 kg (220 lb)   LMP 08/12/2012   BMI 37.18 kg/m     General: healthy, alert and in no distress  Skin: no suspicious lesions or rash.  CV: distal perfusion intact   Resp: normal respiratory effort without conversational dyspnea   Psych: normal mood and affect  Gait: NORMAL  Neuro: Normal light sensory exam of RL extremity     Right Knee exam  Gait: Normal  Alignment:  [x] Normal  [] Anatomic valgus  [] Anatomic varus  Inspection: [x] Normal  [] Ecchymosis present []Other   Palpation:  Joint Tenderness: [] No Tenderness  [x] MJL [] LJL [] MCL Margin [] LCL Margin [] Pes Anserine [] Distal Quadricep  [] Other   Peripatellar " Tenderness: [] None [x] Lateral pole [x] Medial pole [x] Superior pole [] Inferior pole    Patellar tendon pain: [x] None [] Present    Patellar apprehension: [x] None [] Present    Patellar motion: [x] Normal [] Abnormal  Crepitus: [x] None [] Present  Effusion: [x] None [] Trace [] 1+ [] 2+ [] 3+  Range of motion:  Flexion: 135, Extension: 0  Strength:  [x] Full in all planes, including intact extensor mechanism [] Limited as described  Neurologic: Normal sensation   Vascular: Normal pulses   Special tests:   Lachman: Negative  Anterior Drawer: Negative  Sag/quad Activation: NP  Posterior Drawer: Negative  Valgus Stress: Negative at 0/30  Varus Stress: Negative at 0/30  Caryn's: painful   Thessaly: NP     Other notable findings/comments: None      RADIOLOGY:  Final results and radiologist's interpretation, available in the [facility] health record.  Images were reviewed with the patient in the office today.  My personal interpretation of the performed imaging: Mild joint space narrowing medial compartment bilateral knees.  No acute bony abnormalities.  MRI right knee from 9/4/2018 shows:  IMPRESSION:  1. Degenerative tearing of the posterior horn medial meniscus.  2. Patellofemoral chondromalacia. Mild lateral patellar subluxation.  3. Synovitis and moderate-prominent effusion. Loose body cannot be  excluded.  4. Minimal Baker's cyst.  5. Moderate semimembranosus tendinosis    Large Joint Injection/Arthocentesis: R knee joint    Date/Time: 7/30/2024 11:20 AM    Performed by: Chaka Mendez DO  Authorized by: Chaka Mendez DO    Indications:  Pain and osteoarthritis  Needle Size:  22 G  Guidance: ultrasound    Approach:  Anterolateral  Location:  Knee      Medications:  6 mg betamethasone acet & sod phos 6 (3-3) MG/ML; 5 mL lidocaine 1 %; 2 mL ROPivacaine 5 MG/ML  Medications comment:  1ml of 8.4% Sodium Bicarbonate solution was used to buffer the local numbing agent for today's injection    Outcome:  Tolerated  well, no immediate complications  Procedure discussed: discussed risks, benefits, and alternatives    Consent Given by:  Patient  Timeout: timeout called immediately prior to procedure    Prep: patient was prepped and draped in usual sterile fashion     Ultrasound was used to ensure safe and accurate needle placement and injection. Ultrasound images of the procedure were permanently stored.                     Again, thank you for allowing me to participate in the care of your patient.        Sincerely,        Chaka Mendez, DO

## 2024-08-10 ENCOUNTER — HEALTH MAINTENANCE LETTER (OUTPATIENT)
Age: 59
End: 2024-08-10

## 2024-08-13 ENCOUNTER — MYC MEDICAL ADVICE (OUTPATIENT)
Dept: ORTHOPEDICS | Facility: CLINIC | Age: 59
End: 2024-08-13
Payer: COMMERCIAL

## 2024-08-13 DIAGNOSIS — M23.203 DEGENERATIVE TEAR OF MEDIAL MENISCUS OF RIGHT KNEE: Primary | ICD-10-CM

## 2024-08-15 NOTE — TELEPHONE ENCOUNTER
Patient Quality Outreach    Patient is due for the following:   Breast Cancer Screening - Mammogram    Next Steps:   No follow up needed at this time.    Type of outreach:    Chart review performed, no outreach needed.    Next Steps:  Reach out within 90 days via [employer].    Max number of attempts reached: No. Will try again in 90 days if patient still on fail list.    Questions for provider review:    None           Elaina Cox, CASIMIRO  Chart routed to Care Team.

## 2024-08-25 ASSESSMENT — KOOS JR
STRAIGHTENING KNEE FULLY: EXTREME
TWISING OR PIVOTING ON KNEE: SEVERE
GOING UP OR DOWN STAIRS: EXTREME
KOOS JR SCORING: 39.63
HOW SEVERE IS YOUR KNEE STIFFNESS AFTER FIRST WAKING IN MORNING: MODERATE
RISING FROM SITTING: MODERATE
BENDING TO THE FLOOR TO PICK UP OBJECT: MODERATE
STANDING UPRIGHT: MODERATE

## 2024-08-26 ENCOUNTER — OFFICE VISIT (OUTPATIENT)
Dept: ORTHOPEDICS | Facility: CLINIC | Age: 59
End: 2024-08-26
Attending: STUDENT IN AN ORGANIZED HEALTH CARE EDUCATION/TRAINING PROGRAM
Payer: COMMERCIAL

## 2024-08-26 VITALS — WEIGHT: 220 LBS | HEIGHT: 65 IN | BODY MASS INDEX: 36.65 KG/M2

## 2024-08-26 DIAGNOSIS — M23.203 DEGENERATIVE TEAR OF MEDIAL MENISCUS OF RIGHT KNEE: ICD-10-CM

## 2024-08-26 PROCEDURE — 99203 OFFICE O/P NEW LOW 30 MIN: CPT | Performed by: STUDENT IN AN ORGANIZED HEALTH CARE EDUCATION/TRAINING PROGRAM

## 2024-08-26 RX ORDER — DICLOFENAC SODIUM 75 MG/1
75 TABLET, DELAYED RELEASE ORAL 2 TIMES DAILY
Qty: 28 TABLET | Refills: 0 | Status: SHIPPED | OUTPATIENT
Start: 2024-08-26

## 2024-08-26 NOTE — LETTER
8/26/2024      Arabella Hart  [address] [facility] [address]      Dear Colleague,    Thank you for referring your patient, Arabella Hart, to the [facility] [place]. Please see a copy of my visit note below.    CC: Right knee pain    HPI: Patient is a 59-year-old female seen here today for subacute right lateral knee pain.  She has a history of a right knee medial meniscus tear in 2018 that was treated nonoperatively with a corticosteroid injection by my former colleague, Dr Epps.  She notes that over the last 2 months she has had increasing the lateral joint line pain and knee effusion.  She does not recall a traumatic event.  Since that time she has had pain over the lateral aspect of her knee.  This is aggravated by stairs and at the end of a long shift.  She is taking oral Aleve and Tylenol as well as using Voltaren gel.  She has physical therapy set up to start in [place] this week.  She did have an intra-articular corticosteroid injection on July 30 with my colleague, Dr Mendez.  X-rays at that time did not show any significant osteoarthritis.  She is using a knee sleeve.  She has never had a surgery before the right knee.    She does not take any medication for diabetes.  BMI is 37.  She works as a nurse at [employer].  She smokes half a pack of cigarettes per day.  She enjoys walking for exercise.         Patient Active Problem List   Diagnosis     Allergic rhinitis     Uric acid kidney stones     S/P hysterectomy     Hypothyroidism due to Hashimoto's thyroiditis     Irritable bowel syndrome without diarrhea     NAGI (generalized anxiety disorder)     Menopausal flushing     Tobacco use disorder     Spondylosis of lumbar region without myelopathy or radiculopathy     Sacroiliac joint pain     Class 2 severe obesity due to excess calories with serious comorbidity in adult (H)          Past Medical History:   Diagnosis Date     Abnormal Papanicolaou smear of  cervix and cervical HPV     colpo done     ASCUS on Pap smear 12/20/06    + HPV     History of colposcopy with cervical biopsy 1/24/07    Atypical squamous metaplasia          Past Surgical History:   Procedure Laterality Date     COLONOSCOPY N/A 12/11/2018    Procedure: COLONOSCOPY;  Surgeon: Vishnu Mandujano MD;  Location: [facility]     LAPAROSCOPIC CHOLECYSTECTOMY N/A 6/4/2015    Procedure: LAPAROSCOPIC CHOLECYSTECTOMY;  Surgeon: Maria Fernanda Regalado MD;  Location: [facility] OR     LAPAROSCOPIC HYSTERECTOMY TOTAL, BILATERAL SALPINGO-OOPHORECTOMY, COMBINED  8/14/2012    Procedure: COMBINED LAPAROSCOPIC HYSTERECTOMY TOTAL, SALPINGO-OOPHORECTOMY;  LAPAROSCOPIC HYSTERECTOMY TOTAL, Bilateral SALPINGO-OOPHORECTOMY, pelvic exam under anesthesia;  Surgeon: Jolene Baez MD;  Location: [facility] OR     ZZC LIGATE FALLOPIAN TUBE,POSTPARTUM  1998    Tubal Ligation     ZZC NONSPECIFIC PROCEDURE  1983    Right Ankle Surgery          Current Outpatient Medications   Medication Sig Dispense Refill     diclofenac (VOLTAREN) 75 MG EC tablet Take 1 tablet (75 mg) by mouth 2 times daily. 28 tablet 0     ACETAMINOPHEN PO Take 325-750 mg by mouth every 6 hours as needed       amitriptyline (ELAVIL) 50 MG tablet Take 1 tablet (50 mg) by mouth at bedtime 90 tablet 0     Fexofenadine HCl (ALLEGRA ALLERGY PO)         gabapentin (NEURONTIN) 300 MG capsule Take 1 capsule (300 mg) by mouth at bedtime Take with 600 mg capsule 90 capsule 3     gabapentin (NEURONTIN) 600 MG tablet Take 1 tablet (600 mg) by mouth 3 times daily 270 tablet 0     levothyroxine (SYNTHROID/LEVOTHROID) 175 MCG tablet Take 2 tablets one day of the week and 1 tablet the rest of the days of the week 102 tablet 0     methocarbamol (ROBAXIN) 500 MG tablet Take 1 tablet (500 mg) by mouth 4 times daily as needed for muscle spasms 120 tablet 0     metoprolol succinate ER (TOPROL XL) 25 MG 24 hr tablet Take 1 tablet (25 mg) by mouth daily 90 tablet 3     venlafaxine (EFFEXOR-ER)  225 MG 24 hr tablet Take 1 tablet (225 mg) by mouth daily 90 tablet 0          Allergies   Allergen Reactions     Ibuprofen Itching     Monosodium Glutamate      MSG=sinus headache     Oxycodone           Family History   Problem Relation Age of Onset     Breast Cancer Paternal Grandmother      Thyroid Disease Mother         Hypothyroid     Cancer Mother         Ovarian - age 61     Coronary Artery Disease Paternal Grandfather      Hypertension Father         Heart complication after MI.       Cancer Father         Throat (Epiglottis) smoker     Heart Disease Father          of heart attack     Coronary Artery Disease Father      Breast Cancer Paternal Aunt         and P great aunt     Breast Cancer Paternal Aunt      Coronary Artery Disease Brother      Colon Cancer No family hx of           Social History     Tobacco Use     Smoking status: Some Days     Current packs/day: 0.50     Average packs/day: 0.5 packs/day for 15.0 years (7.5 ttl pk-yrs)     Types: Cigarettes     Smokeless tobacco: Never   Substance Use Topics     Alcohol use: Yes     Comment: 1-2 drinks seldom            Objective:  Physical Exam:  RLE: No pain with axial load or logroll of the hip.  No open wounds, lacerations, or prior surgical incisions about the knee.  No significant edema or ecchymosis.  No erythema or drainage.  Grade 2 effusion of the knee joint.  No pain to palpation over the quad tendon patella or patellar tendon.  No pain to palpation over the medial joint line.  Pain to palpation focally over the lateral joint line no pain to palpation over the femoral origin or tibial insertion of the MCL.  No pain to palpation over the femoral origin or fibular insertion of the LCL.  Passive range of motion 0 to 150 degrees of knee flexion.  Active range of motion is equivalent to passive range of motion.  5/5 strength in flexion and extension.  Stable to varus and valgus stress at 0 and 30 degrees of knee flexion with firm  endpoint.  Negative Lachman.  Stable anterior posterior drawer.  Grossly neurovascular intact.    Imaging:  Three-view x-ray of the right knee from July 30, 2024 including weightbearing AP x-ray were reviewed.  This shows very slight narrowing of the medial joint space with well over 50% joint space remaining.  There is small osteophyte off the medial tibial plateau.  Well-preserved patellofemoral joint space.    Assessment and Plan: Patient is a 59-year-old female seen here today for evaluation of subacute right lateral sided knee pain.  She does have a grade 2 effusion today.  She has lateral joint line pain.  X-rays are negative for any significant arthritis.  At this time diagnosis is pointing towards lateral meniscus pathology.  She has already trialed anti-inflammatory medication and intra-articular injection.  She has an MRI from 2018.  Discussed with her that we would need to repeat her MRI to evaluate for new lateral meniscus tear.  At this time point she has trialed significant nonoperative management.  She may be a candidate for arthroscopic surgery.  She is in agreement with this plan.  Will order an MRI without contrast of her right knee.  I will call her after to discuss results and treatment moving forward.      Manav Calderón MD    [facility]   Department of Orthopedic Surgery      Disclaimer: This note consists of symbols derived from keyboarding, dictation and/or voice recognition software. As a result, there may be errors in the script that have gone undetected. Please consider this when interpreting information found in this chart.         Again, thank you for allowing me to participate in the care of your patient.        Sincerely,        Manav Calderón MD

## 2024-08-26 NOTE — PATIENT INSTRUCTIONS
[facility]   [address], [address]  [place] [address] [address] [place] [address]   Appointments: [phone] Appointments: [phone]   Fax: [phone] Fax: [phone]       1. Degenerative tear of medial meniscus of right knee      For scheduling at [facility] ([facility] [place]), call [phone] or [phone]     For scheduling in the North ([facility], and [place]) call  [phone] or  [phone]        For scheduling in the South ([facility]) call [phone]  or   [phone]              Call my office with any questions or concerns, [phone].

## 2024-08-26 NOTE — PROGRESS NOTES
CC: Right knee pain    HPI: Patient is a 59-year-old female seen here today for subacute right lateral knee pain.  She has a history of a right knee medial meniscus tear in 2018 that was treated nonoperatively with a corticosteroid injection by my former colleague, Dr Epps.  She notes that over the last 2 months she has had increasing the lateral joint line pain and knee effusion.  She does not recall a traumatic event.  Since that time she has had pain over the lateral aspect of her knee.  This is aggravated by stairs and at the end of a long shift.  She is taking oral Aleve and Tylenol as well as using Voltaren gel.  She has physical therapy set up to start in [place] this week.  She did have an intra-articular corticosteroid injection on July 30 with my colleague, Dr Mendez.  X-rays at that time did not show any significant osteoarthritis.  She is using a knee sleeve.  She has never had a surgery before the right knee.    She does not take any medication for diabetes.  BMI is 37.  She works as a nurse at [employer].  She smokes half a pack of cigarettes per day.  She enjoys walking for exercise.         Patient Active Problem List   Diagnosis    Allergic rhinitis    Uric acid kidney stones    S/P hysterectomy    Hypothyroidism due to Hashimoto's thyroiditis    Irritable bowel syndrome without diarrhea    NAGI (generalized anxiety disorder)    Menopausal flushing    Tobacco use disorder    Spondylosis of lumbar region without myelopathy or radiculopathy    Sacroiliac joint pain    Class 2 severe obesity due to excess calories with serious comorbidity in adult (H)          Past Medical History:   Diagnosis Date    Abnormal Papanicolaou smear of cervix and cervical HPV     colpo done    ASCUS on Pap smear 12/20/06    + HPV    History of colposcopy with cervical biopsy 1/24/07    Atypical squamous metaplasia          Past Surgical History:   Procedure Laterality Date    COLONOSCOPY N/A 12/11/2018    Procedure:  COLONOSCOPY;  Surgeon: Vishnu Mandujano MD;  Location: [facility]    LAPAROSCOPIC CHOLECYSTECTOMY N/A 6/4/2015    Procedure: LAPAROSCOPIC CHOLECYSTECTOMY;  Surgeon: Maria Fernanda Regalado MD;  Location: [facility]    LAPAROSCOPIC HYSTERECTOMY TOTAL, BILATERAL SALPINGO-OOPHORECTOMY, COMBINED  8/14/2012    Procedure: COMBINED LAPAROSCOPIC HYSTERECTOMY TOTAL, SALPINGO-OOPHORECTOMY;  LAPAROSCOPIC HYSTERECTOMY TOTAL, Bilateral SALPINGO-OOPHORECTOMY, pelvic exam under anesthesia;  Surgeon: Jolene Baez MD;  Location: [facility]    ZZC LIGATE FALLOPIAN TUBE,POSTPARTUM  1998    Tubal Ligation    ZZC NONSPECIFIC PROCEDURE  1983    Right Ankle Surgery          Current Outpatient Medications   Medication Sig Dispense Refill    diclofenac (VOLTAREN) 75 MG EC tablet Take 1 tablet (75 mg) by mouth 2 times daily. 28 tablet 0    ACETAMINOPHEN PO Take 325-750 mg by mouth every 6 hours as needed      amitriptyline (ELAVIL) 50 MG tablet Take 1 tablet (50 mg) by mouth at bedtime 90 tablet 0    Fexofenadine HCl (ALLEGRA ALLERGY PO)        gabapentin (NEURONTIN) 300 MG capsule Take 1 capsule (300 mg) by mouth at bedtime Take with 600 mg capsule 90 capsule 3    gabapentin (NEURONTIN) 600 MG tablet Take 1 tablet (600 mg) by mouth 3 times daily 270 tablet 0    levothyroxine (SYNTHROID/LEVOTHROID) 175 MCG tablet Take 2 tablets one day of the week and 1 tablet the rest of the days of the week 102 tablet 0    methocarbamol (ROBAXIN) 500 MG tablet Take 1 tablet (500 mg) by mouth 4 times daily as needed for muscle spasms 120 tablet 0    metoprolol succinate ER (TOPROL XL) 25 MG 24 hr tablet Take 1 tablet (25 mg) by mouth daily 90 tablet 3    venlafaxine (EFFEXOR-ER) 225 MG 24 hr tablet Take 1 tablet (225 mg) by mouth daily 90 tablet 0          Allergies   Allergen Reactions    Ibuprofen Itching    Monosodium Glutamate      MSG=sinus headache    Oxycodone           Family History   Problem Relation Age of Onset    Breast Cancer Paternal Grandmother      Thyroid Disease Mother         Hypothyroid    Cancer Mother         Ovarian - age 61    Coronary Artery Disease Paternal Grandfather     Hypertension Father         Heart complication after MI.      Cancer Father         Throat (Epiglottis) smoker    Heart Disease Father          of heart attack    Coronary Artery Disease Father     Breast Cancer Paternal Aunt         and P great aunt    Breast Cancer Paternal Aunt     Coronary Artery Disease Brother     Colon Cancer No family hx of           Social History     Tobacco Use    Smoking status: Some Days     Current packs/day: 0.50     Average packs/day: 0.5 packs/day for 15.0 years (7.5 ttl pk-yrs)     Types: Cigarettes    Smokeless tobacco: Never   Substance Use Topics    Alcohol use: Yes     Comment: 1-2 drinks seldom            Objective:  Physical Exam:  RLE: No pain with axial load or logroll of the hip.  No open wounds, lacerations, or prior surgical incisions about the knee.  No significant edema or ecchymosis.  No erythema or drainage.  Grade 2 effusion of the knee joint.  No pain to palpation over the quad tendon patella or patellar tendon.  No pain to palpation over the medial joint line.  Pain to palpation focally over the lateral joint line no pain to palpation over the femoral origin or tibial insertion of the MCL.  No pain to palpation over the femoral origin or fibular insertion of the LCL.  Passive range of motion 0 to 150 degrees of knee flexion.  Active range of motion is equivalent to passive range of motion.  5/5 strength in flexion and extension.  Stable to varus and valgus stress at 0 and 30 degrees of knee flexion with firm endpoint.  Negative Lachman.  Stable anterior posterior drawer.  Grossly neurovascular intact.    Imaging:  Three-view x-ray of the right knee from 2024 including weightbearing AP x-ray were reviewed.  This shows very slight narrowing of the medial joint space with well over 50% joint space  remaining.  There is small osteophyte off the medial tibial plateau.  Well-preserved patellofemoral joint space.    Assessment and Plan: Patient is a 59-year-old female seen here today for evaluation of subacute right lateral sided knee pain.  She does have a grade 2 effusion today.  She has lateral joint line pain.  X-rays are negative for any significant arthritis.  At this time diagnosis is pointing towards lateral meniscus pathology.  She has already trialed anti-inflammatory medication and intra-articular injection.  She has an MRI from 2018.  Discussed with her that we would need to repeat her MRI to evaluate for new lateral meniscus tear.  At this time point she has trialed significant nonoperative management.  She may be a candidate for arthroscopic surgery.  She is in agreement with this plan.  Will order an MRI without contrast of her right knee.  I will call her after to discuss results and treatment moving forward.      Manav Calderón MD    [facility]   Department of Orthopedic Surgery      Disclaimer: This note consists of symbols derived from keyboarding, dictation and/or voice recognition software. As a result, there may be errors in the script that have gone undetected. Please consider this when interpreting information found in this chart.

## 2024-09-10 DIAGNOSIS — F41.1 GAD (GENERALIZED ANXIETY DISORDER): ICD-10-CM

## 2024-09-10 DIAGNOSIS — M54.30 SCIATIC LEG PAIN: ICD-10-CM

## 2024-09-10 DIAGNOSIS — E06.3 HYPOTHYROIDISM DUE TO HASHIMOTO'S THYROIDITIS: ICD-10-CM

## 2024-09-10 RX ORDER — LEVOTHYROXINE SODIUM 175 UG/1
TABLET ORAL
Qty: 102 TABLET | Refills: 3 | Status: SHIPPED | OUTPATIENT
Start: 2024-09-10

## 2024-09-10 RX ORDER — GABAPENTIN 600 MG/1
600 TABLET ORAL 3 TIMES DAILY
Qty: 270 TABLET | Refills: 3 | Status: SHIPPED | OUTPATIENT
Start: 2024-09-10

## 2024-09-10 RX ORDER — VENLAFAXINE HYDROCHLORIDE 225 MG/1
225 TABLET, EXTENDED RELEASE ORAL DAILY
Qty: 90 TABLET | Refills: 3 | Status: SHIPPED | OUTPATIENT
Start: 2024-09-10

## 2024-09-14 ENCOUNTER — HOSPITAL ENCOUNTER (OUTPATIENT)
Dept: MRI IMAGING | Facility: CLINIC | Age: 59
Discharge: HOME | End: 2024-09-14
Attending: STUDENT IN AN ORGANIZED HEALTH CARE EDUCATION/TRAINING PROGRAM | Admitting: STUDENT IN AN ORGANIZED HEALTH CARE EDUCATION/TRAINING PROGRAM
Payer: COMMERCIAL

## 2024-09-14 DIAGNOSIS — M23.203 DEGENERATIVE TEAR OF MEDIAL MENISCUS OF RIGHT KNEE: ICD-10-CM

## 2024-09-14 PROCEDURE — 73721 MRI JNT OF LWR EXTRE W/O DYE: CPT | Mod: RT

## 2024-09-14 PROCEDURE — 73721 MRI JNT OF LWR EXTRE W/O DYE: CPT | Mod: 26 | Performed by: RADIOLOGY

## 2024-09-18 ENCOUNTER — TELEPHONE (OUTPATIENT)
Dept: ORTHOPEDICS | Facility: CLINIC | Age: 59
End: 2024-09-18
Payer: COMMERCIAL

## 2024-09-18 DIAGNOSIS — S83.241A ACUTE MEDIAL MENISCUS TEAR OF RIGHT KNEE, INITIAL ENCOUNTER: Primary | ICD-10-CM

## 2024-09-18 PROCEDURE — 99207 PR NO BILLABLE SERVICE THIS VISIT: CPT | Performed by: STUDENT IN AN ORGANIZED HEALTH CARE EDUCATION/TRAINING PROGRAM

## 2024-09-18 NOTE — TELEPHONE ENCOUNTER
CC: MRI follow-up    Patient is a 59-year-old female known to my practice with right, predominantly medial sided knee pain.  For full history and physical please see my prior note from August.  This is a phone encounter to go over her right knee MRI.  MRI shows complex tearing of the medial meniscus in the mid body extending to the posterior horn.  There are small areas of grade 3-4 chondral wear from the femoral condyle and tibial plateau with a small area of bony edema underneath the femoral condyle.  Lateral meniscus and lateral compartment cartilage intact.  There is also noted to be mucoid cystic changes of the ACL at the tibial insertion and femoral origin.  We discussed treatment options.  Discussed nonoperative management the form of oral anti-inflammatory medication, physical therapy, bracing, and retrial of intra-articular corticosteroid injection.  We discussed operative options of form of right knee arthroscopic partial medial meniscectomy and ACL debridement.  I described the operative technique.  We outlined the postoperative protocol.  I discussed risk and benefits of operative intervention clued but not limited to bleeding, infection, failure to cure pain, damage surrounding nerves and blood vessels, stiffness, posttraumatic arthritis, postoperative DVT/PE, loss of limb and loss of life.  Specifically, I discussed with her that she does have some early cartilage wear in the medial compartment.  This certainly may be contributing to her medial sided knee pain.  On standing x-ray from July she has greater than 50% joint space remaining.  Think it is still reasonable to pursue arthroscopic surgery.  However, I discussed with her that I cannot promise it will take away 100% of her pain.  I cannot predict when her arthritis would come symptomatic in the future.  I had the opportunity answer questions.  At this time she feels she has trialed and exhausted nonoperative management.  She would like to move  forward with operative intervention.  Will get her scheduled for right knee arthroscopic partial medial meniscectomy and ACL debridement.    10 minutes were spent on the phone with the patient.    Manav Calderón MD    [facility]   Department of Orthopedic Surgery

## 2024-09-24 ENCOUNTER — TELEPHONE (OUTPATIENT)
Dept: ORTHOPEDICS | Facility: CLINIC | Age: 59
End: 2024-09-24
Payer: COMMERCIAL

## 2024-09-24 DIAGNOSIS — M54.30 SCIATIC LEG PAIN: ICD-10-CM

## 2024-09-24 DIAGNOSIS — M47.816 SPONDYLOSIS OF LUMBAR REGION WITHOUT MYELOPATHY OR RADICULOPATHY: ICD-10-CM

## 2024-09-24 RX ORDER — AMITRIPTYLINE HYDROCHLORIDE 50 MG/1
50 TABLET ORAL AT BEDTIME
Qty: 90 TABLET | Refills: 1 | Status: SHIPPED | OUTPATIENT
Start: 2024-09-24

## 2024-09-24 NOTE — CONFIDENTIAL NOTE
Other: Patient called and is ready to schedule her surgery for her right knee. Please call patient back to schedule.     Could we send this information to you in [employer] or would you prefer to receive a phone call?:   Patient would prefer a phone call   Okay to leave a detailed message?: Yes at Cell number on file:    Telephone Information:   Mobile [phone]

## 2024-09-24 NOTE — TELEPHONE ENCOUNTER
Spoke to Josiah, advised we need to do a PA with [facility], and we will call her back to schedule surgery.

## 2024-09-27 ENCOUNTER — MYC MEDICAL ADVICE (OUTPATIENT)
Dept: FAMILY MEDICINE | Facility: CLINIC | Age: 59
End: 2024-09-27
Payer: COMMERCIAL

## 2024-10-01 ENCOUNTER — MYC MEDICAL ADVICE (OUTPATIENT)
Dept: ORTHOPEDICS | Facility: CLINIC | Age: 59
End: 2024-10-01
Payer: COMMERCIAL

## 2024-10-02 ENCOUNTER — TELEPHONE (OUTPATIENT)
Dept: ORTHOPEDICS | Facility: CLINIC | Age: 59
End: 2024-10-02
Payer: COMMERCIAL

## 2024-10-02 DIAGNOSIS — Z98.890 S/P RIGHT KNEE ARTHROSCOPY: Primary | ICD-10-CM

## 2024-10-02 DIAGNOSIS — S83.241A ACUTE MEDIAL MENISCUS TEAR OF RIGHT KNEE, INITIAL ENCOUNTER: ICD-10-CM

## 2024-10-02 NOTE — TELEPHONE ENCOUNTER
Teaching Flowsheet   Relevant Diagnosis: Acute medial meniscus tear of right knee, initial encounter   Teaching Topic: 11/5/24 Right knee arthroscopy, partial medial meniscectomy, ACL debridement at the [facility] with Dr. Calderón.        Person(s) involved in teaching:   Patient     Motivation Level:  Asks Questions: Yes  Eager to Learn: Yes  Cooperative: Yes  Receptive (willing/able to accept information): Yes  Any cultural factors/Mu-ism beliefs that may influence understanding or compliance? No     Patient demonstrates understanding of the following:  Reason for the appointment, diagnosis and treatment plan: Yes  Knowledge of proper use of medications and conditions for which they are ordered (with special attention to potential side effects or drug interactions): Yes  Which situations necessitate calling provider and whom to contact: Yes     Teaching Concerns Addressed: Smoking.         Proper use and care of crutches (medical equip, care aids, etc.): Yes  Nutritional needs and diet plan: Yes  Pain management techniques: Yes  Wound Care: Yes  How and/when to access community resources: Yes     Instructional Materials Used/Given: Reviewed same-day surgery instructions with patient (NPO, showering, stoplight tool, need for pre-op H&P within 30 days of procedure, and responsible adult /person to stay with patient for 24 hours post surgery).  Folder mailed to patient.

## 2024-10-12 ENCOUNTER — NURSE TRIAGE (OUTPATIENT)
Dept: NURSING | Facility: CLINIC | Age: 59
End: 2024-10-12
Payer: COMMERCIAL

## 2024-10-12 ENCOUNTER — TELEPHONE (OUTPATIENT)
Dept: FAMILY MEDICINE | Facility: CLINIC | Age: 59
End: 2024-10-12
Payer: COMMERCIAL

## 2024-10-12 DIAGNOSIS — U07.1 INFECTION DUE TO 2019 NOVEL CORONAVIRUS: Primary | ICD-10-CM

## 2024-10-12 NOTE — TELEPHONE ENCOUNTER
COVID Positive/Requesting COVID treatment    Patient is positive for COVID and requesting treatment options.    Date of positive COVID test (PCR or at home)? 10/12/2024 on home test  Current COVID symptoms: cough, shortness of breath or difficulty breathing, fatigue, headache, and congestion or runny nose  Date COVID symptoms began: 10/10/2024 - sinus headache & tested negative. Pt is a nurse.     Message should be routed to clinic RN pool. Best phone number to use for call back: [phone], voicemail is available.

## 2024-10-12 NOTE — TELEPHONE ENCOUNTER
Nurse Triage SBAR    Is this a 2nd Level Triage? NO    Situation: Patient calling     Background: covid    Assessment: Patient positive for covid.   has routed request for Paxlovid to clinic. Pt stated she has not taken her temp but feels that she may have fever.  Feels SOB only when coughing, otherwise, no SOB.     Protocol Recommended Disposition:   Call PCP Within 24 Hours    Recommendation: Pt transferred to  for [facility] per request to get Paxlovid sooner than Monday.           Does the patient meet one of the following criteria for ADS visit consideration? 16+ years old, with an [facility] PCP     TIP  Providers, please consider if this condition is appropriate for management at one of our Acute and Diagnostic Services sites.     If patient is a good candidate, please use dotphrase <dot>triageresponse and select Refer to ADS to document.    Reason for Disposition   [1] HIGH RISK patient (e.g., weak immune system, age > 64 years, obesity with BMI 30 or higher, pregnant, chronic lung disease) AND [2] COVID symptoms (e.g., cough, fever)  (Exceptions: Already seen by PCP and no new or worsening symptoms.)    Additional Information   Negative: SEVERE difficulty breathing (e.g., struggling for each breath, speaks in single words)   Negative: Difficult to awaken or acting confused (e.g., disoriented, slurred speech)   Negative: Bluish (or gray) lips or face now   Negative: Shock suspected (e.g., cold/pale/clammy skin, too weak to stand, low BP, rapid pulse)   Negative: Sounds like a life-threatening emergency to the triager   Negative: SEVERE or constant chest pain or pressure  (Exception: Mild central chest pain, present only when coughing.)   Negative: MODERATE difficulty breathing (e.g., speaks in phrases, SOB even at rest, pulse 100-120)   Negative: [1] Headache AND [2] stiff neck (can't touch chin to chest)   Negative: Oxygen level (e.g., pulse oximetry) 90% or lower   Negative: Chest pain or pressure   (Exception: MILD central chest pain, present only when coughing.)   Negative: [1] Drinking very little AND [2] dehydration suspected (e.g., no urine > 12 hours, very dry mouth, very lightheaded)   Negative: Patient sounds very sick or weak to the triager   Negative: MILD difficulty breathing (e.g., minimal/no SOB at rest, SOB with walking, pulse <100)   Negative: Fever > 103 F (39.4 C)   Negative: [1] Fever > 101 F (38.3 C) AND [2] age > 60 years   Negative: [1] Fever > 100 F (37.8 C) AND [2] bedridden (e.g., CVA, chronic illness, recovering from surgery)   Negative: Oxygen level (e.g., pulse oximetry) 91 to 94%    Protocols used: COVID-19 - Diagnosed or [id number]

## 2024-10-14 NOTE — TELEPHONE ENCOUNTER
RN COVID TREATMENT VISIT  10/14/24      The patient has been triaged and does not require a higher level of care.    Arabella Hart  59 year old  Current weight? 220    Has the patient been seen by a primary care provider at an [facility] or [facility] within the past two years? Yes.   Have you been in close proximity to/do you have a known exposure to a person with a confirmed case of influenza? No.     General treatment eligibility:  Date of positive COVID test (PCR or at home)?  10/12/2024    Are you or have you been hospitalized for this COVID-19 infection? No.   Have you received monoclonal antibodies or antiviral treatment for COVID-19 since this positive test? No.   Do you have any of the following conditions that place you at risk of being very sick from COVID-19?   - Age 50 years or older  Yes, patient has at least one high risk condition as noted above.     Current COVID symptoms:   - cough  - sore throat  - congestion or runny nose  Yes. Patient has at least one symptom as selected.     How many days since symptoms started? 5 days or less. Established patient, 12 years or older weighing at least 88.2 lbs, who has symptoms that started in the past 5 days, has not been hospitalized nor received treatment already, and is at risk for being very sick from COVID-19.     Treatment eligibility by RN:  Are you currently pregnant or nursing? No  Do you have a clinically significant hypersensitivity to nirmatrelvir or ritonavir, or toxic epidermal necrolysis (TEN) or Hilliard-Clay Syndrome? No  Do you have a history of hepatitis, any hepatic impairment on the Problem List (such as Child-Merino Class C, cirrhosis, fatty liver disease, alcoholic liver disease), or was the last liver lab (hepatic panel, ALT, AST, ALK Phos, bilirubin) elevated in the past 6 months? No  Do you have any history of severe renal impairment (eGFR < 30mL/min)? No    Is patient eligible to continue? Yes,  patient meets all eligibility requirements for the RN COVID treatment (as denoted by all no responses above).     Current Outpatient Medications   Medication Sig Dispense Refill    ACETAMINOPHEN PO Take 325-750 mg by mouth every 6 hours as needed      amitriptyline (ELAVIL) 50 MG tablet Take 1 tablet (50 mg) by mouth at bedtime 90 tablet 1    diclofenac (VOLTAREN) 75 MG EC tablet Take 1 tablet (75 mg) by mouth 2 times daily. 28 tablet 0    Fexofenadine HCl (ALLEGRA ALLERGY PO)        gabapentin (NEURONTIN) 300 MG capsule Take 1 capsule (300 mg) by mouth at bedtime Take with 600 mg capsule 90 capsule 3    gabapentin (NEURONTIN) 600 MG tablet Take 1 tablet (600 mg) by mouth 3 times daily 270 tablet 3    levothyroxine (SYNTHROID/LEVOTHROID) 175 MCG tablet Take 2 tablets by mouth one day of the week and 1 tablet the rest of the days of the week 102 tablet 3    methocarbamol (ROBAXIN) 500 MG tablet Take 1 tablet (500 mg) by mouth 4 times daily as needed for muscle spasms 120 tablet 0    metoprolol succinate ER (TOPROL XL) 25 MG 24 hr tablet Take 1 tablet (25 mg) by mouth daily 90 tablet 3    venlafaxine (EFFEXOR-ER) 225 MG 24 hr tablet Take 1 tablet (225 mg) by mouth daily 90 tablet 3       Medications from List 1 of the standing order (on medications that exclude the use of Paxlovid) that patient is taking: NONE. Is patient taking Faina's Wort? No  Is patient taking [facility]'s Wort or any meds from List 1? No.   Medications from List 2 of the standing order (on meds that provider needs to adjust) that patient is taking: NONE. Is patient on any of the meds from List 2? No.   Medications from List 3 of standing order (on meds that a RN needs to adjust) that patient is taking: NONE. Is patient on any meds from List 3? No.     Paxlovid has an approximate 90% reduction in hospitalization. Paxlovid can possibly cause altered sense of taste, diarrhea (loose, watery stools), high blood pressure, muscle aches.     Would  patient like a Paxlovid prescription?   Yes.   Lab Results   Component Value Date    GFRESTIMATED >90 05/21/2024       Was last eGFR reduced? No, eGFR 60 or greater/ No Result on record. Patient can receive the normal renal function dose. Sent to [facility] pharmacy per pt request Temporary change to home medications: None    All medication adjustments (holds, etc) were discussed with the patient and patient was asked to repeat back (teachback) their med adjustment.  Did patient understand med adjustment? Yes, patient repeated back and understood correctly.        Reviewed the following instructions with the patient:    Paxlovid (nimatrelvir and ritonavir)    How it works  Two medicines (nirmatrelvir and ritonavir) are taken together. They stop the virus from growing. Less amount of virus is easier for your body to fight.    How to take  Medicine comes in a daily container with both medicine tablets. Take by mouth twice daily (once in the morning, once at night) for 5 days.  The number of tablets to take varies by patient.  Don't chew or break capsules. Swallow whole.    When to take  Take as soon as possible after positive COVID-19 test result, and within 5 days of your first symptoms.    Possible side effects  Can cause altered sense of taste, diarrhea (loose, watery stools), high blood pressure, muscle aches.    Nathalie Bennett, RN

## 2024-10-21 ENCOUNTER — TELEPHONE (OUTPATIENT)
Dept: FAMILY MEDICINE | Facility: CLINIC | Age: 59
End: 2024-10-21
Payer: COMMERCIAL

## 2024-10-21 ENCOUNTER — TELEPHONE (OUTPATIENT)
Dept: ORTHOPEDICS | Facility: CLINIC | Age: 59
End: 2024-10-21
Payer: COMMERCIAL

## 2024-10-21 NOTE — TELEPHONE ENCOUNTER
Other: Josiah called she tested positive for covid on 10/12/24 and she took the plaxvoid but was asymptomatic she has now tested negative on 10/17/24 and 10/19/24 and she has returned to work and wants to know if she can continue with the plan for surgery on 11/05/24. Please call to discuss    Could we send this information to you in [facility] or would you prefer to receive a phone call?:   Patient would prefer a phone call   Okay to leave a detailed message?: Yes at Cell number on file:    Telephone Information:   Mobile [phone]

## 2024-10-21 NOTE — TELEPHONE ENCOUNTER
Josiah does not have any significant symptoms of COVID that would delay surgery.  She is presently negative.

## 2024-10-21 NOTE — TELEPHONE ENCOUNTER
Pt stated that she tested positive for covid om 10/12/2024 and she is suppose to have surgery on 11/5/2024 can she still have surgery - Rn advised that she call the surgeons office as she cannot speak for them     Patient stated an understanding and agreed with plan.    Leatha Gonsalez RN, BSN  [facility]

## 2024-10-24 ENCOUNTER — TELEPHONE (OUTPATIENT)
Dept: ORTHOPEDICS | Facility: CLINIC | Age: 59
End: 2024-10-24

## 2024-10-24 NOTE — TELEPHONE ENCOUNTER
Reason for Call:  Form, our goal is to have forms completed with 7 days, however, some forms may require a visit or additional information.    Type of letter, form or note:  FMLA     Who is the form from?: Patient    Where did the form come from: Patient or family brought in       Phone number of person requesting form: Josiah, phone #: [phone]  Can we leave a detailed message on this number:  YES    Desired completion date of form: ASAP      How will form be returned?:  fax to Elba Robins at fax #: [phone]    Has the patient signed a consent form for release of information (may be included with form)? Not Applicable    Form was started and place in accordion folder for provider review/ completion at [place].

## 2024-11-01 ENCOUNTER — OFFICE VISIT (OUTPATIENT)
Dept: FAMILY MEDICINE | Facility: CLINIC | Age: 59
End: 2024-11-01
Payer: COMMERCIAL

## 2024-11-01 VITALS
OXYGEN SATURATION: 98 % | RESPIRATION RATE: 16 BRPM | WEIGHT: 224 LBS | BODY MASS INDEX: 37.32 KG/M2 | DIASTOLIC BLOOD PRESSURE: 77 MMHG | TEMPERATURE: 98.3 F | HEIGHT: 65 IN | SYSTOLIC BLOOD PRESSURE: 129 MMHG | HEART RATE: 86 BPM

## 2024-11-01 DIAGNOSIS — F17.200 TOBACCO USE DISORDER: ICD-10-CM

## 2024-11-01 DIAGNOSIS — M79.18 MYOFASCIAL PAIN: ICD-10-CM

## 2024-11-01 DIAGNOSIS — Z01.818 PREOP GENERAL PHYSICAL EXAM: Primary | ICD-10-CM

## 2024-11-01 DIAGNOSIS — E06.3 HYPOTHYROIDISM DUE TO HASHIMOTO'S THYROIDITIS: ICD-10-CM

## 2024-11-01 DIAGNOSIS — S83.241D ACUTE MEDIAL MENISCUS TEAR OF RIGHT KNEE, SUBSEQUENT ENCOUNTER: ICD-10-CM

## 2024-11-01 DIAGNOSIS — R73.03 PREDIABETES: ICD-10-CM

## 2024-11-01 LAB
EST. AVERAGE GLUCOSE BLD GHB EST-MCNC: 137 MG/DL
HBA1C MFR BLD: 6.4 % (ref 0–5.6)
HGB BLD-MCNC: 14.1 G/DL (ref 11.7–15.7)

## 2024-11-01 PROCEDURE — 84443 ASSAY THYROID STIM HORMONE: CPT | Performed by: FAMILY MEDICINE

## 2024-11-01 PROCEDURE — 83036 HEMOGLOBIN GLYCOSYLATED A1C: CPT | Performed by: FAMILY MEDICINE

## 2024-11-01 PROCEDURE — 85018 HEMOGLOBIN: CPT | Performed by: FAMILY MEDICINE

## 2024-11-01 PROCEDURE — 36415 COLL VENOUS BLD VENIPUNCTURE: CPT | Performed by: FAMILY MEDICINE

## 2024-11-01 PROCEDURE — 80048 BASIC METABOLIC PNL TOTAL CA: CPT | Performed by: FAMILY MEDICINE

## 2024-11-01 PROCEDURE — 84439 ASSAY OF FREE THYROXINE: CPT | Performed by: FAMILY MEDICINE

## 2024-11-01 PROCEDURE — 99214 OFFICE O/P EST MOD 30 MIN: CPT | Performed by: FAMILY MEDICINE

## 2024-11-01 RX ORDER — VARENICLINE TARTRATE 0.5 MG/1
TABLET, FILM COATED ORAL
Qty: 11 TABLET | Refills: 0 | Status: SHIPPED | OUTPATIENT
Start: 2024-11-01 | End: 2024-11-07

## 2024-11-01 RX ORDER — VARENICLINE TARTRATE 1 MG/1
1 TABLET, FILM COATED ORAL 2 TIMES DAILY
Qty: 60 TABLET | Refills: 2 | Status: SHIPPED | OUTPATIENT
Start: 2024-11-01

## 2024-11-01 RX ORDER — METHOCARBAMOL 500 MG/1
500 TABLET, FILM COATED ORAL 4 TIMES DAILY PRN
Qty: 60 TABLET | Refills: 0 | Status: SHIPPED | OUTPATIENT
Start: 2024-11-01

## 2024-11-01 ASSESSMENT — ANXIETY QUESTIONNAIRES
2. NOT BEING ABLE TO STOP OR CONTROL WORRYING: NOT AT ALL
GAD7 TOTAL SCORE: 3
1. FEELING NERVOUS, ANXIOUS, OR ON EDGE: SEVERAL DAYS
8. IF YOU CHECKED OFF ANY PROBLEMS, HOW DIFFICULT HAVE THESE MADE IT FOR YOU TO DO YOUR WORK, TAKE CARE OF THINGS AT HOME, OR GET ALONG WITH OTHER PEOPLE?: NOT DIFFICULT AT ALL
IF YOU CHECKED OFF ANY PROBLEMS ON THIS QUESTIONNAIRE, HOW DIFFICULT HAVE THESE PROBLEMS MADE IT FOR YOU TO DO YOUR WORK, TAKE CARE OF THINGS AT HOME, OR GET ALONG WITH OTHER PEOPLE: NOT DIFFICULT AT ALL
GAD7 TOTAL SCORE: 3
6. BECOMING EASILY ANNOYED OR IRRITABLE: NOT AT ALL
7. FEELING AFRAID AS IF SOMETHING AWFUL MIGHT HAPPEN: NOT AT ALL
3. WORRYING TOO MUCH ABOUT DIFFERENT THINGS: SEVERAL DAYS
5. BEING SO RESTLESS THAT IT IS HARD TO SIT STILL: SEVERAL DAYS
7. FEELING AFRAID AS IF SOMETHING AWFUL MIGHT HAPPEN: NOT AT ALL
GAD7 TOTAL SCORE: 3
4. TROUBLE RELAXING: NOT AT ALL

## 2024-11-01 ASSESSMENT — PATIENT HEALTH QUESTIONNAIRE - PHQ9
SUM OF ALL RESPONSES TO PHQ QUESTIONS 1-9: 9
SUM OF ALL RESPONSES TO PHQ QUESTIONS 1-9: 9
10. IF YOU CHECKED OFF ANY PROBLEMS, HOW DIFFICULT HAVE THESE PROBLEMS MADE IT FOR YOU TO DO YOUR WORK, TAKE CARE OF THINGS AT HOME, OR GET ALONG WITH OTHER PEOPLE: VERY DIFFICULT

## 2024-11-01 NOTE — PROGRESS NOTES
Preoperative Evaluation  [facility]  [address] [address]  Phone: [phone]  Primary Provider: Ely Dozier MD  Pre-op Performing Provider: Jovan Gold DO  Nov 1, 2024 11/1/2024   Surgical Information   What procedure is being done? Meniscus remival    Facility or Hospital where procedure/surgery will be performed: [facility]    Who is doing the procedure / surgery? Dr Calderón    Date of surgery / procedure: Nov 5    Time of surgery / procedure: 7am    Where do you plan to recover after surgery? at home with family        Patient-reported     Fax number for surgical facility: Note does not need to be faxed, will be available electronically in Epic.    Assessment & Plan     The proposed surgical procedure is considered INTERMEDIATE risk.  Patient cleared for upcoming surgery.    See after visit summary and result note for helpful information and advice given to patient.    Preop general physical exam  Patient cleared for upcoming procedure.  - Basic metabolic panel  - Hemoglobin  - Hemoglobin    Acute medial meniscus tear of right knee, subsequent encounter    Myofascial pain    - methocarbamol (ROBAXIN) 500 MG tablet  Dispense: 60 tablet; Refill: 0    Tobacco use disorder    - varenicline (CHANTIX) 0.5 MG tablet  Dispense: 11 tablet; Refill: 0  - varenicline (CHANTIX) 1 MG tablet  Dispense: 60 tablet; Refill: 2    Hypothyroidism due to Hashimoto's thyroiditis    - TSH with free T4 reflex  - T4 free    Prediabetes    - Hemoglobin A1c  - Basic metabolic panel       - No identified additional risk factors other than previously addressed    Preoperative Medication Instructions  Antiplatelet or Anticoagulation Medication Instructions   - Patient is on no antiplatelet or anticoagulation medications.    Additional Medication Instructions  Take all scheduled medications on the day of surgery    Recommendation  Approval given to proceed with  proposed procedure, without further diagnostic evaluation.    Claudia Rahman is a 59 year old, presenting for the following:  Pre-Op Exam (- Right Knee)          11/1/2024    11:17 AM   Additional Questions   Roomed by DESIRE Ng   Accompanied by Self     HPI related to upcoming procedure: Patient has diagnosis of right knee medial meniscus tear.  This condition is going to be addressed through upcoming surgery.        11/1/2024   Pre-Op Questionnaire   Have you ever had a heart attack or stroke? No    Have you ever had surgery on your heart or blood vessels, such as a stent placement, a coronary artery bypass, or surgery on an artery in your head, neck, heart, or legs? No    Do you have chest pain with activity? No    Do you have a history of heart failure? No    Do you currently have a cold, bronchitis or symptoms of other infection? No    Do you have a cough, shortness of breath, or wheezing? No    Do you or anyone in your family have previous history of blood clots? No    Do you or does anyone in your family have a serious bleeding problem such as prolonged bleeding following surgeries or cuts? No    Have you ever had problems with anemia or been told to take iron pills? No    Have you had any abnormal blood loss such as black, tarry or bloody stools, or abnormal vaginal bleeding? No    Have you ever had a blood transfusion? No    Are you willing to have a blood transfusion if it is medically needed before, during, or after your surgery? Yes    Have you or any of your relatives ever had problems with anesthesia? (!) YES--Personal history of vomiting using anesthesia.     Do you have sleep apnea, excessive snoring or daytime drowsiness? No    Do you have any artifical heart valves or other implanted medical devices like a pacemaker, defibrillator, or continuous glucose monitor? No    Do you have artificial joints? No    Are you allergic to latex? No        Patient-reported     Health Care  Directive  Patient does not have a Health Care Directive: Discussed advance care planning with patient; however, patient declined at this time.    Preoperative Review of    reviewed - controlled substances reflected in medication list.          Patient Active Problem List    Diagnosis Date Noted    Class 2 severe obesity due to excess calories with serious comorbidity in adult (H) 05/21/2024     Priority: Medium    Sacroiliac joint pain 04/03/2023     Priority: Medium    Spondylosis of lumbar region without myelopathy or radiculopathy 10/27/2022     Priority: Medium    Tobacco use disorder 06/22/2018     Priority: Medium    Menopausal flushing 05/09/2018     Priority: Medium    Hypothyroidism due to Hashimoto's thyroiditis 08/18/2016     Priority: Medium    Irritable bowel syndrome without diarrhea 08/18/2016     Priority: Medium    NAGI (generalized anxiety disorder) 08/18/2016     Priority: Medium    S/P hysterectomy 08/14/2012     Priority: Medium    Uric acid kidney stones 09/27/2011     Priority: Medium     Problem list name updated by automated process. Provider to review and confirm      Allergic rhinitis      Priority: Medium     Problem list name updated by automated process. Provider to review        Past Medical History:   Diagnosis Date    Abnormal Papanicolaou smear of cervix and cervical HPV     colpo done    Arthritis     ASCUS on Pap smear 12/20/2006    + HPV    Cancer (H)     History of colposcopy with cervical biopsy 01/24/2007    Atypical squamous metaplasia    Thyroid disease 1999     Past Surgical History:   Procedure Laterality Date    ABDOMEN SURGERY      BIOPSY      COLONOSCOPY N/A 12/11/2018    Procedure: COLONOSCOPY;  Surgeon: Vishnu Mandujano MD;  Location:  GI    LAPAROSCOPIC CHOLECYSTECTOMY N/A 06/04/2015    Procedure: LAPAROSCOPIC CHOLECYSTECTOMY;  Surgeon: Maria Fernanda Regalado MD;  Location: [facility] OR    LAPAROSCOPIC HYSTERECTOMY TOTAL, BILATERAL SALPINGO-OOPHORECTOMY, COMBINED   08/14/2012    Procedure: COMBINED LAPAROSCOPIC HYSTERECTOMY TOTAL, SALPINGO-OOPHORECTOMY;  LAPAROSCOPIC HYSTERECTOMY TOTAL, Bilateral SALPINGO-OOPHORECTOMY, pelvic exam under anesthesia;  Surgeon: Jolene Baez MD;  Location: [facility] OR    ZZC LIGATE FALLOPIAN TUBE,POSTPARTUM  1998    Tubal Ligation    ZZC NONSPECIFIC PROCEDURE  1983    Right Ankle Surgery     Current Outpatient Medications   Medication Sig Dispense Refill    ACETAMINOPHEN PO Take 325-750 mg by mouth every 6 hours as needed      amitriptyline (ELAVIL) 50 MG tablet Take 1 tablet (50 mg) by mouth at bedtime 90 tablet 1    diclofenac (VOLTAREN) 75 MG EC tablet Take 1 tablet (75 mg) by mouth 2 times daily. 28 tablet 0    Fexofenadine HCl (ALLEGRA ALLERGY PO)        gabapentin (NEURONTIN) 300 MG capsule Take 1 capsule (300 mg) by mouth at bedtime Take with 600 mg capsule 90 capsule 3    gabapentin (NEURONTIN) 600 MG tablet Take 1 tablet (600 mg) by mouth 3 times daily 270 tablet 3    levothyroxine (SYNTHROID/LEVOTHROID) 175 MCG tablet Take 2 tablets by mouth one day of the week and 1 tablet the rest of the days of the week 102 tablet 3    methocarbamol (ROBAXIN) 500 MG tablet Take 1 tablet (500 mg) by mouth 4 times daily as needed for muscle spasms. 60 tablet 0    metoprolol succinate ER (TOPROL XL) 25 MG 24 hr tablet Take 1 tablet (25 mg) by mouth daily 90 tablet 3    varenicline (CHANTIX) 0.5 MG tablet Take 1 tablet (0.5 mg) by mouth daily for 3 days, THEN 1 tablet (0.5 mg) 2 times daily for 4 days. 11 tablet 0    varenicline (CHANTIX) 1 MG tablet Take 1 tablet (1 mg) by mouth 2 times daily. 60 tablet 2    venlafaxine (EFFEXOR-ER) 225 MG 24 hr tablet Take 1 tablet (225 mg) by mouth daily 90 tablet 3       Allergies   Allergen Reactions    Ibuprofen Itching    Monosodium Glutamate      MSG=sinus headache    Oxycodone         Social History     Tobacco Use    Smoking status: Every Day     Current packs/day: 0.50     Average packs/day: 0.7 packs/day  "for 27.5 years (20.0 ttl pk-yrs)     Types: Cigarettes    Smokeless tobacco: Never   Substance Use Topics    Alcohol use: Yes     Comment: 1-2 drinks seldom       History   Drug Use No             Review of Systems  Constitutional, neuro, ENT, endocrine, pulmonary, cardiac, gastrointestinal, genitourinary, musculoskeletal, integument and psychiatric systems are negative, except as otherwise noted.    Patient has \"situational\" mental health problems related to right knee discomfort. No thoughts of self harm.    At time of exam, other then right knee discomfort which can adversely affect her sleep and cause fatigue, patient has no acute physical or mental health concerns.    Patient feels her palpitations are better with taking an oral beta blocker.     Patient is trying to cut back on tobacco use before surgery. She is interested in starting Chantix treatment today.     Objective    /77 (BP Location: Right arm, Patient Position: Sitting, Cuff Size: Adult Large)   Pulse 86   Temp 98.3  F (36.8  C) (Oral)   Resp 16   Ht 1.638 m (5' 4.5\")   Wt 101.6 kg (224 lb)   LMP 08/12/2012 (Approximate)   SpO2 98%   Breastfeeding No   BMI 37.86 kg/m     Estimated body mass index is 37.86 kg/m  as calculated from the following:    Height as of this encounter: 1.638 m (5' 4.5\").    Weight as of this encounter: 101.6 kg (224 lb).  Physical Exam  General: Vital signs reviewed.  Patient is in no acute appearing distress.  Breathing appears nonlabored.  Patient is alert and oriented ×3.      ENT: Ear exam shows bilateral tympanic membranes to be clear without injection, nasal turbinates show no injection or edema, no pharyngeal injection or exudate.    Neck: supple with no adenoapthy, palpable abnormal masses, or thyroid abnormality.    Eyes: No scleral, lid, or periorbital injection or edema noted.  No eye mattering noted.  Corneas are clear. Pupils are equal round and reactive to light with normal consensual eye " movement.    Heart: Heart rate is regular without murmur.    Lungs: Lungs are clear to auscultation with good airflow bilaterally.    Abdomen:  Abdomen is soft, nontender.  No palpable abnormal masses or organomegaly.  Bowel sounds are normal.    GYN exam: Declined by patient, who states she has no acute pelvic or breast concerns.    Back: No areas of tenderness.    Skin: Warm and dry, with no rash or abnormal lesions noted.    Extremities: No ankle edema noted.  No joint edema or restricted range of motion noted.    Focal exam right knee: Exam is significant for right knee being mildly warm over lateral/inferior anterior knee without swelling.     Neuro: No acute focal deficits or other abnormalities noted.    Psych: Patient is pleasant, making good eye contact, with clear and fluent speech.  Answers questions appropriately. No psychomotor agitation.    Recent Labs   Lab Test 05/21/24  1132      POTASSIUM 3.9   CR 0.57   A1C 6.2*        Diagnostics  Recent Results (from the past week)   Hemoglobin A1c    Collection Time: 11/01/24 12:08 PM   Result Value Ref Range    Estimated Average Glucose 137 (H) <117 mg/dL    Hemoglobin A1C 6.4 (H) 0.0 - 5.6 %   Basic metabolic panel    Collection Time: 11/01/24 12:08 PM   Result Value Ref Range    Sodium 141 135 - 145 mmol/L    Potassium 3.6 3.4 - 5.3 mmol/L    Chloride 105 98 - 107 mmol/L    Carbon Dioxide (CO2) 26 22 - 29 mmol/L    Anion Gap 10 7 - 15 mmol/L    Urea Nitrogen 14.3 6.0 - 20.0 mg/dL    Creatinine 0.62 0.51 - 0.95 mg/dL    GFR Estimate >90 >60 mL/min/1.73m2    Calcium 9.0 8.8 - 10.4 mg/dL    Glucose 180 (H) 70 - 99 mg/dL   TSH with free T4 reflex    Collection Time: 11/01/24 12:08 PM   Result Value Ref Range    TSH 0.07 (L) 0.30 - 4.20 uIU/mL   Hemoglobin    Collection Time: 11/01/24 12:08 PM   Result Value Ref Range    Hemoglobin 14.1 11.7 - 15.7 g/dL   T4 free    Collection Time: 11/01/24 12:08 PM   Result Value Ref Range    Free T4 1.69 0.90 - 1.70  ng/dL      No EKG required, no history of coronary heart disease, significant arrhythmia, peripheral arterial disease or other structural heart disease.    Revised Cardiac Risk Index (RCRI)  The patient has the following serious cardiovascular risks for perioperative complications:   - No serious cardiac risks = 0 points     RCRI Interpretation: 0 points: Class I (very low risk - 0.4% complication rate)         Signed Electronically by: Jovan Gold DO  A copy of this evaluation report is provided to the requesting physician.         Answers submitted by the patient for this visit:  Patient Health Questionnaire (Submitted on 11/1/2024)  If you checked off any problems, how difficult have these problems made it for you to do your work, take care of things at home, or get along with other people?: Very difficult  PHQ9 TOTAL SCORE: 9  Patient Health Questionnaire (G7) (Submitted on 11/1/2024)  NAGI 7 TOTAL SCORE: 3

## 2024-11-01 NOTE — PATIENT INSTRUCTIONS
Patient Education   Preparing for Your Surgery  For Adults  Getting started  In most cases, a nurse will call to review your health history and instructions. They will give you an arrival time based on your scheduled surgery time. Please be ready to share:  Your doctor's clinic name and phone number  Your medical, surgical, and anesthesia history  A list of allergies and sensitivities  A list of medicines, including herbal treatments and over-the-counter drugs  Whether the patient has a legal guardian (ask how to send us the papers in advance)  Note: You may not receive a call if you were seen at our PAC (Preoperative Assessment Center).  Please tell us if you're pregnant--or if there's any chance you might be pregnant. Some surgeries may injure a fetus (unborn baby), so they require a pregnancy test. Surgeries that are safe for a fetus don't always need a test, and you can choose whether to have one.   Preparing for surgery  Within 10 to 30 days of surgery: Have a pre-op exam (sometimes called an H&P, or History and Physical). This can be done at a clinic or pre-operative center.  If you're having a , you may not need this exam. Talk to your care team.  At your pre-op exam, talk to your care team about all medicines you take. (This includes CBD oil and any drugs, such as THC, marijuana, and other forms of cannabis.) If you need to stop any medicine before surgery, ask when to start taking it again.  This is for your safety. Many medicines and drugs can make you bleed too much during surgery. Some change how well surgery (anesthesia) drugs work.  Call your insurance company to let them know you're having surgery. (If you don't have insurance, call [phone].)  Call your clinic if there's any change in your health. This includes a scrape or scratch near the surgery site, or any signs of a cold (sore throat, runny nose, cough, rash, fever).  Eating and drinking guidelines  For your safety: Unless your  surgeon tells you otherwise, follow the guidelines below.  Eat and drink as normal until 8 hours before you arrive for surgery. After that, no food or milk. You can spit out gum when you arrive.  Drink clear liquids until 2 hours before you arrive. These are liquids you can see through, like water, Gatorade, and Propel Water. They also include plain black coffee and tea (no cream or milk).  No alcohol for 24 hours before you arrive. The night before surgery, stop any drinks that contain THC.  If your care team tells you to take medicine on the morning of surgery, it's okay to take it with a sip of water. No other medicines or drugs are allowed (including CBD oil)--follow your care team's instructions.  If you have questions the day of surgery, call your hospital or surgery center.   Preventing infection  Shower or bathe the night before and the morning of surgery. Follow the instructions your clinic gave you. (If no instructions, use regular soap.)  Don't shave or clip hair near your surgery site. We'll remove the hair if needed.  Don't smoke or vape the morning of surgery. No chewing tobacco for 6 hours before you arrive. A nicotine patch is okay. You may spit out nicotine gum when you arrive.  For some surgeries, the surgeon will tell you to fully quit smoking and nicotine.  We will make every effort to keep you safe from infection. We will:  Clean our hands often with soap and water (or an alcohol-based hand rub).  Clean the skin at your surgery site with a special soap that kills germs.  Give you a special gown to keep you warm. (Cold raises the risk of infection.)  Wear hair covers, masks, gowns, and gloves during surgery.  Give antibiotic medicine, if prescribed. Not all surgeries need this medicine.  What to bring on the day of surgery  Photo ID and insurance card  Copy of your health care directive, if you have one  Glasses and hearing aids (bring cases)  You can't wear contacts during surgery  Inhaler and  eye drops, if you use them (tell us about these when you arrive)  CPAP machine or breathing device, if you use them  A few personal items, if spending the night  If you have . . .  A pacemaker, ICD (cardiac defibrillator), or other implant: Bring the ID card.  An implanted stimulator: Bring the remote control.  A legal guardian: Bring a copy of the certified (court-stamped) guardianship papers.  Please remove any jewelry, including body piercings. Leave jewelry and other valuables at home.  If you're going home the day of surgery  You must have a responsible adult drive you home. They should stay with you overnight as well.  If you don't have someone to stay with you, and you aren't safe to go home alone, we may keep you overnight. Insurance often won't pay for this.  After surgery  If it's hard to control your pain or you need more pain medicine, please call your surgeon's office.  Questions?   If you have any questions for your care team, list them here:   ____________________________________________________________________________________________________________________________________________________________________________________________________________________________________________________________  For informational purposes only. Not to replace the advice of your health care provider. Copyright   2003, 2019 [employer]. All rights reserved. Clinically reviewed by Robbin Hwang MD. [email or web address] [id number] - REV 08/24.     How to Take Your Medication Before Surgery  Preoperative Medication Instructions   Antiplatelet or Anticoagulation Medication Instructions   - Patient is on no antiplatelet or anticoagulation medications.    Additional Medication Instructions  Take all scheduled medications on the day of surgery       Patient Education   Preparing for Your Surgery  For Adults  Getting started  In most cases, a nurse will call to review your health history and instructions. They will give you an  arrival time based on your scheduled surgery time. Please be ready to share:  Your doctor's clinic name and phone number  Your medical, surgical, and anesthesia history  A list of allergies and sensitivities  A list of medicines, including herbal treatments and over-the-counter drugs  Whether the patient has a legal guardian (ask how to send us the papers in advance)  Note: You may not receive a call if you were seen at our PAC (Preoperative Assessment Center).  Please tell us if you're pregnant--or if there's any chance you might be pregnant. Some surgeries may injure a fetus (unborn baby), so they require a pregnancy test. Surgeries that are safe for a fetus don't always need a test, and you can choose whether to have one.   Preparing for surgery  Within 10 to 30 days of surgery: Have a pre-op exam (sometimes called an H&P, or History and Physical). This can be done at a clinic or pre-operative center.  If you're having a , you may not need this exam. Talk to your care team.  At your pre-op exam, talk to your care team about all medicines you take. (This includes CBD oil and any drugs, such as THC, marijuana, and other forms of cannabis.) If you need to stop any medicine before surgery, ask when to start taking it again.  This is for your safety. Many medicines and drugs can make you bleed too much during surgery. Some change how well surgery (anesthesia) drugs work.  Call your insurance company to let them know you're having surgery. (If you don't have insurance, call [phone].)  Call your clinic if there's any change in your health. This includes a scrape or scratch near the surgery site, or any signs of a cold (sore throat, runny nose, cough, rash, fever).  Eating and drinking guidelines  For your safety: Unless your surgeon tells you otherwise, follow the guidelines below.  Eat and drink as normal until 8 hours before you arrive for surgery. After that, no food or milk. You can spit out gum when  you arrive.  Drink clear liquids until 2 hours before you arrive. These are liquids you can see through, like water, Gatorade, and Propel Water. They also include plain black coffee and tea (no cream or milk).  No alcohol for 24 hours before you arrive. The night before surgery, stop any drinks that contain THC.  If your care team tells you to take medicine on the morning of surgery, it's okay to take it with a sip of water. No other medicines or drugs are allowed (including CBD oil)--follow your care team's instructions.  If you have questions the day of surgery, call your hospital or surgery center.   Preventing infection  Shower or bathe the night before and the morning of surgery. Follow the instructions your clinic gave you. (If no instructions, use regular soap.)  Don't shave or clip hair near your surgery site. We'll remove the hair if needed.  Don't smoke or vape the morning of surgery. No chewing tobacco for 6 hours before you arrive. A nicotine patch is okay. You may spit out nicotine gum when you arrive.  For some surgeries, the surgeon will tell you to fully quit smoking and nicotine.  We will make every effort to keep you safe from infection. We will:  Clean our hands often with soap and water (or an alcohol-based hand rub).  Clean the skin at your surgery site with a special soap that kills germs.  Give you a special gown to keep you warm. (Cold raises the risk of infection.)  Wear hair covers, masks, gowns, and gloves during surgery.  Give antibiotic medicine, if prescribed. Not all surgeries need this medicine.  What to bring on the day of surgery  Photo ID and insurance card  Copy of your health care directive, if you have one  Glasses and hearing aids (bring cases)  You can't wear contacts during surgery  Inhaler and eye drops, if you use them (tell us about these when you arrive)  CPAP machine or breathing device, if you use them  A few personal items, if spending the night  If you have . . .  A  pacemaker, ICD (cardiac defibrillator), or other implant: Bring the ID card.  An implanted stimulator: Bring the remote control.  A legal guardian: Bring a copy of the certified (court-stamped) guardianship papers.  Please remove any jewelry, including body piercings. Leave jewelry and other valuables at home.  If you're going home the day of surgery  You must have a responsible adult drive you home. They should stay with you overnight as well.  If you don't have someone to stay with you, and you aren't safe to go home alone, we may keep you overnight. Insurance often won't pay for this.  After surgery  If it's hard to control your pain or you need more pain medicine, please call your surgeon's office.  Questions?   If you have any questions for your care team, list them here:   ____________________________________________________________________________________________________________________________________________________________________________________________________________________________________________________________  For informational purposes only. Not to replace the advice of your health care provider. Copyright   2003, 2019 [employer]. All rights reserved. Clinically reviewed by Robbin Hwang MD. [employer] [id number] - REV 08/24.

## 2024-11-02 LAB
ANION GAP SERPL CALCULATED.3IONS-SCNC: 10 MMOL/L (ref 7–15)
BUN SERPL-MCNC: 14.3 MG/DL (ref 6–20)
CALCIUM SERPL-MCNC: 9 MG/DL (ref 8.8–10.4)
CHLORIDE SERPL-SCNC: 105 MMOL/L (ref 98–107)
CREAT SERPL-MCNC: 0.62 MG/DL (ref 0.51–0.95)
EGFRCR SERPLBLD CKD-EPI 2021: >90 ML/MIN/1.73M2
GLUCOSE SERPL-MCNC: 180 MG/DL (ref 70–99)
HCO3 SERPL-SCNC: 26 MMOL/L (ref 22–29)
POTASSIUM SERPL-SCNC: 3.6 MMOL/L (ref 3.4–5.3)
SODIUM SERPL-SCNC: 141 MMOL/L (ref 135–145)
T4 FREE SERPL-MCNC: 1.69 NG/DL (ref 0.9–1.7)
TSH SERPL DL<=0.005 MIU/L-ACNC: 0.07 UIU/ML (ref 0.3–4.2)

## 2024-11-04 NOTE — TELEPHONE ENCOUNTER
Patient states Dr. Calderón said he would keep her out of work for 6 weeks, and then she would reach out when ready to return. 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     Please advise if in agreement for patient to be off of work for 6 weeks, and for work ability to be reassessed at 6 week post op visit.     Clarice Barfield, ATC

## 2024-11-05 ENCOUNTER — NON-VISIT BILLABLE ENCOUNTER (OUTPATIENT)
Dept: ORTHOPEDICS | Facility: CLINIC | Age: 59
End: 2024-11-05
Payer: COMMERCIAL

## 2024-11-05 DIAGNOSIS — Z98.890 S/P RIGHT KNEE ARTHROSCOPY: Primary | ICD-10-CM

## 2024-11-05 PROCEDURE — 29881 ARTHRS KNE SRG MNISECTMY M/L: CPT | Mod: RT | Performed by: STUDENT IN AN ORGANIZED HEALTH CARE EDUCATION/TRAINING PROGRAM

## 2024-11-05 RX ORDER — HYDROCODONE BITARTRATE AND ACETAMINOPHEN 5; 325 MG/1; MG/1
1 TABLET ORAL EVERY 6 HOURS PRN
Qty: 10 TABLET | Refills: 0 | Status: SHIPPED | OUTPATIENT
Start: 2024-11-05 | End: 2024-11-08

## 2024-11-05 RX ORDER — ACETAMINOPHEN 500 MG
500-1000 TABLET ORAL EVERY 6 HOURS PRN
Qty: 90 TABLET | Refills: 0 | Status: SHIPPED | OUTPATIENT
Start: 2024-11-05

## 2024-11-05 RX ORDER — IBUPROFEN 600 MG/1
600 TABLET, FILM COATED ORAL EVERY 6 HOURS PRN
Qty: 90 TABLET | Refills: 0 | Status: SHIPPED | OUTPATIENT
Start: 2024-11-05

## 2024-11-05 NOTE — TELEPHONE ENCOUNTER
Forms completed and faxed to desired location.    Copy sent to scan and original placed in Dr. Calderón's provider folder.    Brandi Miranda, VF

## 2024-11-05 NOTE — PROCEDURES
Date of Surgery: November 5, 2024    Location: [facility]    Surgeon: Manav Calderón MD     Assistants:   1. Nithin Simms PA-C    A skilled surgical assistant was required to assist with patient positioning, draping, and retraction during open portions of the case.  Specifically, Mr. Simms was critical for patient positioning, limb manipulation, and closure.    Pre-operative Diagnosis:   1) Right Knee Medial Meniscus Tear  2) Right Mucoid ACL Degeneration    Post-operative Diagnosis:   1) Right Knee Medial Meniscus Tear  2) Right Mucoid ACL Degeneration    Procedure:   1) Right Knee Arthroscopic Partial MEdial Meniscectomy - 71115  2) Right Knee ACL Debridement - 72203     Implants:  None    Anesthesia: General with Block    Estimated Blood Loss: 10 ml       Complications: None       Specimen: None    Tourniquette Time: 25 min    Condition: Stable to PACU    Procedure Details   Indications for Procedure:  Patient is a 59-year-old female known to my clinic with a right knee medial meniscus tear and ACL mucoid degeneration.  For full history and physical please see my clinic H&P.  In brief review, she has had worsening symptoms over the past 3-4 months.  MRI confirmed medial meniscus tear ACL mucoid degeneration.  I discussed operative and nonoperative options in clinic.  We discussed nonoperative management in the form of oral anti-inflammatory medication, activity modification, physical therapy, bracing, and intra-articular steroid injection.  We discussed operative intervention in the form of  knee arthroscopy, partial meniscectomy, and ACL debridement.  We discussed the risks and benefits of operative intervention including but not limited to bleeding, infection, failure to cure pain, damage to surrounding nerves and blood vessels, post-traumatic arthritis, stiffness,  loss of function, postoperative DVT/PE, loss of limb and loss of life.  I had the opportunity answer any  questions.  The patient ultimately elected to move forward with a right knee arthroscopic partial medial meniscectomy.     Procedure Details:  On the day of surgery, the patient was met in the preoperative holding area.  The correct limb was confirmed and marked with a permanent skin marker.  Informed consent was again reviewed confirming the correct patient, site, procedure, surgeon, and laterality.  We again discussed the risks and benefits of operative intervention, and the nonoperative alternatives.  I had the opportunity to answer any questions.  The patient wished to move forward with operative intervention.      The patient was brought back to the operative suite, transferred from the hospital bed to the operative table without incident, and secured using a belt.  General anesthesia was induced by my anesthesia colleagues.  A nonsterile thigh tourniquet was placed. The knee was sterilely prepped and draped in the normal fashion.  A final timeout was performed with all in the room in agreement with the correct patient, site, procedure, laterality, and surgeon, as well as preoperative antibiotics have been instilled.     The location of the anterior inferolateral and inferomedial medial portals were identified by palpation of bony landmarks. I injected 15 cc of 0.25% Marcaine without epinephrine subcutaneously into the portal sites.     The anterior inferolateral portal was made with an 11 blade.  The trocar was inserted.  Any joint effusion was drained.  The camera was inserted.  I began the diagnostic arthroscopy in the patellofemoral joint.  Grade 2 chondral wear was note on the patella.  Grade 1 chondral wear was note on the trochlea.  I then entered the lateral gutter.  Any effusion was drained.  No synovitis or loose bodies were noted.  I then entered the medial gutter.  Any effusion was drained.  No loose bodies or synovitis were noted.  The knee was placed in a valgus stress.  I entered the medial  compartment.  The location of the medial portal was identified with a spinal needle and the portal was made with an 11 blade under direct visualization.  I inserted a probe.  Diffuse Grade 2 chondral wear was note on the  medial femoral condyle. Diffuse Grade 1 chondral wear was note on the medial tibial plateau. I evaluated the medial meniscus.  There was noted to be complex tearing of the posterior horn of the medial meniscus extending into the mid body.  The posterior root of the medial meniscus was intact.  The tear was probed.  There was noted to be an upper and lower leaflet.  He is unstable to probing.  The meniscus tear to be repairable.  A partial medial meniscectomy was performed with arthroscopic biter and shaver to trim the mid body and posterior horn back to a stable rim.  Approximately 40% of the meniscus within the mid body and posterior horn remained.     I then entered to the notch.  The ACL and PCL were noted to be taught and intact.  There was noted to be a mucoid cyst at the base of the ACL.  I brought the knee into full extension.  This did not impinge on the notch.  This was gently debrided with arthroscopic biter and shaver back to the native ACL.     The leg was then placed in the Figure-4 position.  I entered the lateral compartment.  No chondral wear was note on the  lateral femoral condyle. No chondral wear was note on the lateral tibial plateau. I evaluated the lateral meniscus.  There was noted to be no meniscus tear.     The knee was drained of any effusion.  The portal sites were closed with 3-0 nylon in a figure-of-eight fashion.  10mg morphine in 9cc normal saline was injected intra-articular through the lateral portal.  Soft dressings were applied. The drapes were taken down.  2+ dorsalis pedis and posterior tibialis pulse were noted.  An Ace wrap was applied.       The patient was successfully awoken from general anesthesia.  The patient was transferred from the operative table to  "the hospital bed without incident.  The patient returned to PACU in stable condition.     All sponge, needle, and instrument counts were correct at the end of the case.    Post-Operative Plan:  Patient will be discharged home when stable from PACU. Oral pain medication and anti-nausea medication to the outpatient pharmacy. Crutches have been provided for comfort.    -Weight Bearing Status: as tolerated   -ROM: as tolerated.  -Physical therapy: begin next week per the \"Knee Arthroscopy, Meniscectomy, and Chondroplasty\" postoperative protocol.   -Follow-up: myself or Nithin Simms PA-C in 10-14 days in the outpatient clinic for suture removal.     Manav Calderón MD    [facility]   Department of Orthopedic Surgery      Disclaimer: This note consists of symbols derived from keyboarding, dictation and/or voice recognition software. As a result, there may be errors in the script that have gone undetected. Please consider this when interpreting information found in this chart.     "

## 2024-11-07 NOTE — TELEPHONE ENCOUNTER
Patient called again asking to resend/refax FMLA forms. Employer did not received them. Per patient.

## 2024-11-07 NOTE — CONFIDENTIAL NOTE
Other: Patient called with a new fax number for her [facility] paperwork. Can you please fax it to [phone].     Could we send this information to you in [place] or would you prefer to receive a phone call?:   Patient would prefer a phone call   Okay to leave a detailed message?: Yes at Cell number on file:    Telephone Information:   Mobile [phone]

## 2024-11-11 ASSESSMENT — ACTIVITIES OF DAILY LIVING (ADL)
WEAKNESS: I DO NOT HAVE THE SYMPTOM
PAIN: THE SYMPTOM AFFECTS MY ACTIVITY SLIGHTLY
GO DOWN STAIRS: ACTIVITY IS FAIRLY DIFFICULT
GO UP STAIRS: ACTIVITY IS FAIRLY DIFFICULT
GIVING WAY, BUCKLING OR SHIFTING OF KNEE: I DO NOT HAVE THE SYMPTOM
WEAKNESS: I DO NOT HAVE THE SYMPTOM
LIMPING: THE SYMPTOM AFFECTS MY ACTIVITY SLIGHTLY
SIT WITH YOUR KNEE BENT: ACTIVITY IS MINIMALLY DIFFICULT
KNEEL ON THE FRONT OF YOUR KNEE: I AM UNABLE TO DO THE ACTIVITY
PAIN: THE SYMPTOM AFFECTS MY ACTIVITY SLIGHTLY
SIT WITH YOUR KNEE BENT: ACTIVITY IS MINIMALLY DIFFICULT
RISE FROM A CHAIR: ACTIVITY IS FAIRLY DIFFICULT
SQUAT: I AM UNABLE TO DO THE ACTIVITY
HOW_WOULD_YOU_RATE_THE_OVERALL_FUNCTION_OF_YOUR_KNEE_DURING_YOUR_USUAL_DAILY_ACTIVITIES?: ABNORMAL
HOW_WOULD_YOU_RATE_THE_CURRENT_FUNCTION_OF_YOUR_KNEE_DURING_YOUR_USUAL_DAILY_ACTIVITIES_ON_A_SCALE_FROM_0_TO_100_WITH_100_BEING_YOUR_LEVEL_OF_KNEE_FUNCTION_PRIOR_TO_YOUR_INJURY_AND_0_BEING_THE_INABILITY_TO_PERFORM_ANY_OF_YOUR_USUAL_DAILY_ACTIVITIES?: 40
STAND: ACTIVITY IS NOT DIFFICULT
WALK: ACTIVITY IS FAIRLY DIFFICULT
WALK: ACTIVITY IS FAIRLY DIFFICULT
RAW_SCORE: 38
HOW_WOULD_YOU_RATE_THE_CURRENT_FUNCTION_OF_YOUR_KNEE_DURING_YOUR_USUAL_DAILY_ACTIVITIES_ON_A_SCALE_FROM_0_TO_100_WITH_100_BEING_YOUR_LEVEL_OF_KNEE_FUNCTION_PRIOR_TO_YOUR_INJURY_AND_0_BEING_THE_INABILITY_TO_PERFORM_ANY_OF_YOUR_USUAL_DAILY_ACTIVITIES?: 40
GO DOWN STAIRS: ACTIVITY IS FAIRLY DIFFICULT
SQUAT: I AM UNABLE TO DO THE ACTIVITY
STIFFNESS: THE SYMPTOM AFFECTS MY ACTIVITY SLIGHTLY
KNEE_ACTIVITY_OF_DAILY_LIVING_SUM: 38
LIMPING: THE SYMPTOM AFFECTS MY ACTIVITY SLIGHTLY
GO UP STAIRS: ACTIVITY IS FAIRLY DIFFICULT
SWELLING: THE SYMPTOM AFFECTS MY ACTIVITY MODERATELY
RISE FROM A CHAIR: ACTIVITY IS FAIRLY DIFFICULT
KNEE_ACTIVITY_OF_DAILY_LIVING_SCORE: 54.29
AS_A_RESULT_OF_YOUR_KNEE_INJURY,_HOW_WOULD_YOU_RATE_YOUR_CURRENT_LEVEL_OF_DAILY_ACTIVITY?: SEVERELY ABNORMAL
GIVING WAY, BUCKLING OR SHIFTING OF KNEE: I DO NOT HAVE THE SYMPTOM
HOW_WOULD_YOU_RATE_THE_OVERALL_FUNCTION_OF_YOUR_KNEE_DURING_YOUR_USUAL_DAILY_ACTIVITIES?: ABNORMAL
KNEEL ON THE FRONT OF YOUR KNEE: I AM UNABLE TO DO THE ACTIVITY
SWELLING: THE SYMPTOM AFFECTS MY ACTIVITY MODERATELY
PLEASE_INDICATE_YOR_PRIMARY_REASON_FOR_REFERRAL_TO_THERAPY:: KNEE
STIFFNESS: THE SYMPTOM AFFECTS MY ACTIVITY SLIGHTLY
STAND: ACTIVITY IS NOT DIFFICULT
AS_A_RESULT_OF_YOUR_KNEE_INJURY,_HOW_WOULD_YOU_RATE_YOUR_CURRENT_LEVEL_OF_DAILY_ACTIVITY?: SEVERELY ABNORMAL

## 2024-11-12 ENCOUNTER — THERAPY VISIT (OUTPATIENT)
Dept: PHYSICAL THERAPY | Facility: CLINIC | Age: 59
End: 2024-11-12
Payer: COMMERCIAL

## 2024-11-12 DIAGNOSIS — S83.241A ACUTE MEDIAL MENISCUS TEAR OF RIGHT KNEE, INITIAL ENCOUNTER: ICD-10-CM

## 2024-11-12 DIAGNOSIS — Z98.890 S/P RIGHT KNEE ARTHROSCOPY: ICD-10-CM

## 2024-11-12 PROCEDURE — 97110 THERAPEUTIC EXERCISES: CPT | Mod: GP | Performed by: PHYSICAL THERAPIST

## 2024-11-12 PROCEDURE — 97161 PT EVAL LOW COMPLEX 20 MIN: CPT | Mod: GP | Performed by: PHYSICAL THERAPIST

## 2024-11-12 NOTE — TELEPHONE ENCOUNTER
[place] Paperwork faxed to location requested by patient.  Fax #: [phone]    Confirmed using Right[employer]ax that fax was successful.    Original copy placed back in provider folder.    No further action required.  Brandi Miranda, Visit Facilitator

## 2024-11-12 NOTE — PROGRESS NOTES
PHYSICAL THERAPY EVALUATION  Type of Visit: Evaluation     Fall Risk Screen:  Fall screen completed by: PT  Have you fallen 2 or more times in the past year?: No  Have you fallen and had an injury in the past year?: No  Is patient a fall risk?: No    Subjective history of right knee pain secondary to a medial meniscus tear and mucoid degeneration of the ACL. 11-5-24 ACL surgery for a right medial menisectomy and debridement of the ACL.         Presenting condition or subjective complaint: (Patient-Rptd) 1 week post op right knee  Date of onset:      Relevant medical history: (Patient-Rptd) Arthritis; Osteoarthritis; Pain at night or rest; Thyroid problems   Dates & types of surgery: (Patient-Rptd) Right knee 11.5.2024    Prior diagnostic imaging/testing results: (Patient-Rptd) MRI; X-ray     Prior therapy history for the same diagnosis, illness or injury: (Patient-Rptd) No      Prior Level of Function  Transfers: Independent  Ambulation:  pt is not using crutches, pt was advised to use at least 1 crutch until pain and swelling decrease  ADL: Independent  IADL: Driving, Housekeeping, Work    Living Environment  Social support: (Patient-Rptd) With family members   Type of home: (Patient-Rptd) House; Multi-level   Stairs to enter the home: (Patient-Rptd) Yes (Patient-Rptd) 11 Is there a railing: (Patient-Rptd) Yes     Ramp: (Patient-Rptd) No   Stairs inside the home: (Patient-Rptd) Yes (Patient-Rptd) 18 Is there a railing: (Patient-Rptd) Yes     Help at home: (Patient-Rptd) Home management tasks (cooking, cleaning); Home and Yard maintenance tasks  Equipment owned: (Patient-Rptd) Crutches     Employment: (Patient-Rptd) Yes (Patient-Rptd) Nurse  Hobbies/Interests: (Patient-Rptd) [employer] painting    Patient goals for therapy: (Patient-Rptd) Reduce swelling    Pain assessment: Pain present  Location: right medial/ lateral aspect of the knee /Rating: 3/10 to 7/10     Objective   KNEE EVALUATION  PAIN: Pain Level at Rest:  3/10  Pain Level with Use: 7/10  Pain Location: knee  Pain Quality: Aching and Sharp  Pain Frequency: intermittent  Pain is Worst: daytime  Pain is Exacerbated By: ambulation, standing, stairs and transfers  Pain is Relieved By: cold, rest, and elevation  Pain Progression: Improved  INTEGUMENTARY (edema, incisions):  moderate swelling posterior right knee   POSTURE: WNL  GAIT:  Weightbearing Status: WBAT  Assistive Device(s): None  Gait Deviations: Stance time decreased  BALANCE/PROPRIOCEPTION: N/A  WEIGHTBEARING ALIGNMENT:   NON-WEIGHTBEARING ALIGNMENT:   ROM:  right knee AROM 0- PROM 0-3-120    STRENGTH:  MMT right quadriceps 4-/5 hamstrings 4/5 hip abductors 4/5  FLEXIBILITY:   SPECIAL TESTS:   FUNCTIONAL TESTS:   PALPATION:  point tenderness lateral joint line right knee   JOINT MOBILITY:     Assessment & Plan   CLINICAL IMPRESSIONS  Medical Diagnosis: right medial menisectomy, right ACL debridement    Treatment Diagnosis: right knee pain, decreased ROM/ strength, edema   Impression/Assessment: Patient is a 59 year old female with right knee complaints.  The following significant findings have been identified: Pain, Decreased ROM/flexibility, and Decreased strength. These impairments interfere with their ability to perform self care tasks, work tasks, recreational activities, household chores, driving , household mobility, and community mobility as compared to previous level of function.     Clinical Decision Making (Complexity):  Clinical Presentation: Stable/Uncomplicated  Clinical Presentation Rationale: based on medical and personal factors listed in PT evaluation  Clinical Decision Making (Complexity): Low complexity    PLAN OF CARE  Treatment Interventions:  Modalities: Cryotherapy  Interventions: Therapeutic Exercise    Long Term Goals     PT Goal 1  Goal Identifier: stairs  Goal Description: pt able to ascend/descend 10 steps in a reciprocal manner pain levle 2  Rationale: to maximize safety and  independence with performance of ADLs and functional tasks;to maximize safety and independence within the home;to maximize safety and independence within the community;to maximize safety and independence with transportation;to maximize safety and independence with self cares  Target Date: 01/07/25      Frequency of Treatment: 1x/week  Duration of Treatment: 6 weeks    Recommended Referrals to Other Professionals:   Education Assessment:   Learner/Method: No Barriers to Learning    Risks and benefits of evaluation/treatment have been explained.   Patient/Family/caregiver agrees with Plan of Care.     Evaluation Time:             Signing Clinician: Jovan Uribe PT

## 2024-11-18 ENCOUNTER — THERAPY VISIT (OUTPATIENT)
Dept: PHYSICAL THERAPY | Facility: CLINIC | Age: 59
End: 2024-11-18
Payer: COMMERCIAL

## 2024-11-18 ENCOUNTER — OFFICE VISIT (OUTPATIENT)
Dept: ORTHOPEDICS | Facility: CLINIC | Age: 59
End: 2024-11-18
Payer: COMMERCIAL

## 2024-11-18 VITALS — WEIGHT: 224 LBS | BODY MASS INDEX: 37.32 KG/M2 | HEIGHT: 65 IN

## 2024-11-18 DIAGNOSIS — Z98.890 S/P RIGHT KNEE ARTHROSCOPY: Primary | ICD-10-CM

## 2024-11-18 DIAGNOSIS — Z98.890 S/P RIGHT KNEE ARTHROSCOPY: ICD-10-CM

## 2024-11-18 DIAGNOSIS — S83.241A ACUTE MEDIAL MENISCUS TEAR OF RIGHT KNEE, INITIAL ENCOUNTER: Primary | ICD-10-CM

## 2024-11-18 PROCEDURE — 99024 POSTOP FOLLOW-UP VISIT: CPT | Performed by: STUDENT IN AN ORGANIZED HEALTH CARE EDUCATION/TRAINING PROGRAM

## 2024-11-18 PROCEDURE — 97110 THERAPEUTIC EXERCISES: CPT | Mod: GP | Performed by: PHYSICAL THERAPIST

## 2024-11-18 NOTE — PROGRESS NOTES
CC: Status post right knee arthroscopic partial medial meniscectomy and ACL debridement    HPI: Patient is a 59-year-old female seen here today 2 weeks status post a right knee arthroscopic partial medial meniscectomy ACL debridement.  Overall she is doing well.  She is ambulate without assistive device.  She is doing physical therapy at our [place] location.  She is weaned off her pain medication.    Objective:   PE:  RLE: Surgical incisions over the anterior aspect of the knee healing appropriately.  No sign of erythema or drainage.  Trace joint effusion.  Passive range of motion is 0 to 130 degrees knee flexion.  Active range of motion is closer to passive range of motion.    A/P:  Patient is a 59-year-old female seen here today 2 weeks status post right knee arthroscopic partial medial meniscectomy and ACL debridement.  Overall she is doing well.  She is doing physical therapy at our [place] location.  I will not give her any restrictions on range of motion strengthening.  I reviewed her intraoperative arthroscopic images with her today.  She will follow-up with me in 4 weeks for clinical evaluation.    Manav Calderón MD    [facility]   Department of Orthopedic Surgery      Disclaimer: This note consists of symbols derived from keyboarding, dictation and/or voice recognition software. As a result, there may be errors in the script that have gone undetected. Please consider this when interpreting information found in this chart.

## 2024-11-18 NOTE — LETTER
11/18/2024      Arabella Hart  [address] [place] [address]      Dear Colleague,    Thank you for referring your patient, Arabella Hart, to the [facility] [place]. Please see a copy of my visit note below.    CC: Status post right knee arthroscopic partial medial meniscectomy and ACL debridement    HPI: Patient is a 59-year-old female seen here today 2 weeks status post a right knee arthroscopic partial medial meniscectomy ACL debridement.  Overall she is doing well.  She is ambulate without assistive device.  She is doing physical therapy at our [place] location.  She is weaned off her pain medication.    Objective:   PE:  RLE: Surgical incisions over the anterior aspect of the knee healing appropriately.  No sign of erythema or drainage.  Trace joint effusion.  Passive range of motion is 0 to 130 degrees knee flexion.  Active range of motion is closer to passive range of motion.    A/P:  Patient is a 59-year-old female seen here today 2 weeks status post right knee arthroscopic partial medial meniscectomy and ACL debridement.  Overall she is doing well.  She is doing physical therapy at our [place] location.  I will not give her any restrictions on range of motion strengthening.  I reviewed her intraoperative arthroscopic images with her today.  She will follow-up with me in 4 weeks for clinical evaluation.    Manav Calderón MD    [facility]   Department of Orthopedic Surgery      Disclaimer: This note consists of symbols derived from keyboarding, dictation and/or voice recognition software. As a result, there may be errors in the script that have gone undetected. Please consider this when interpreting information found in this chart.         Again, thank you for allowing me to participate in the care of your patient.        Sincerely,        Manav Calderón MD

## 2024-12-03 ENCOUNTER — THERAPY VISIT (OUTPATIENT)
Dept: PHYSICAL THERAPY | Facility: CLINIC | Age: 59
End: 2024-12-03
Payer: COMMERCIAL

## 2024-12-03 DIAGNOSIS — Z98.890 S/P RIGHT KNEE ARTHROSCOPY: ICD-10-CM

## 2024-12-03 DIAGNOSIS — S83.241A ACUTE MEDIAL MENISCUS TEAR OF RIGHT KNEE, INITIAL ENCOUNTER: Primary | ICD-10-CM

## 2024-12-03 PROCEDURE — 97110 THERAPEUTIC EXERCISES: CPT | Mod: GP | Performed by: PHYSICAL THERAPIST

## 2024-12-09 ENCOUNTER — MYC MEDICAL ADVICE (OUTPATIENT)
Dept: ORTHOPEDICS | Facility: CLINIC | Age: 59
End: 2024-12-09

## 2024-12-09 NOTE — TELEPHONE ENCOUNTER
"Patient complains of new pain in knee.      On 11/5/24 she had:  1) Right Knee Arthroscopic Partial MEdial Meniscectomy - 47728  2) Right Knee ACL Debridement - 29554    Since Friday she has had new pain below her knee cap which feels like a \"golf ball\".  No obvious sign of deformity.  Pain is increased by kneeling and going down steps.  She is also having new muscle spasms. She cancelled her PT appt for today.  I would recommend keeping that appointment if she can get back in.  They can assess her knee and they have myofascial release techniques that may be of benefit in this situation.      I recommended rest, ice, elevation, anti-inflammatories.  I will forward to Dr. Calderón for further recommendations.  Her next follow up with Dr. Calderón is on 12/20.     "

## 2024-12-10 ENCOUNTER — PATIENT OUTREACH (OUTPATIENT)
Dept: CARE COORDINATION | Facility: CLINIC | Age: 59
End: 2024-12-10
Payer: COMMERCIAL

## 2024-12-17 ENCOUNTER — THERAPY VISIT (OUTPATIENT)
Dept: PHYSICAL THERAPY | Facility: CLINIC | Age: 59
End: 2024-12-17
Payer: COMMERCIAL

## 2024-12-17 DIAGNOSIS — Z98.890 S/P RIGHT KNEE ARTHROSCOPY: ICD-10-CM

## 2024-12-17 DIAGNOSIS — S83.241A ACUTE MEDIAL MENISCUS TEAR OF RIGHT KNEE, INITIAL ENCOUNTER: Primary | ICD-10-CM

## 2024-12-17 PROCEDURE — 97110 THERAPEUTIC EXERCISES: CPT | Mod: GP | Performed by: PHYSICAL THERAPIST

## 2024-12-22 ENCOUNTER — HEALTH MAINTENANCE LETTER (OUTPATIENT)
Age: 59
End: 2024-12-22

## 2024-12-23 DIAGNOSIS — M79.18 MYOFASCIAL PAIN: ICD-10-CM

## 2024-12-23 RX ORDER — METHOCARBAMOL 500 MG/1
TABLET, FILM COATED ORAL
Qty: 60 TABLET | Refills: 0 | Status: SHIPPED | OUTPATIENT
Start: 2024-12-23

## 2025-01-22 NOTE — PROGRESS NOTES
[facility] INTERVENTIONAL CONSULT    Date of visit: 1/23/2025    Reason for consultation:    Arabella Hart is a 59 year old female who is seen in consultation today at the request of her primary care physician, Ely Dozier.     Consultation and Evaluation for: PROCEDURE ONLY CONSULT  Lumbar radiculopathy    Review of Electronic Chart: Today I have also reviewed available medical information in the patient's medical record at [place] ([facility]), including relevant provider notes, laboratory work, and imaging.     Chief Complaint:    Pain and Back Pain      Pain history:  Arabella Hart  is a 59-year-old female with PMH of with a past medical history of thyroid disease & arthritis presenting with low back pain. She describes the pain as achy, originating from the midline and radiating across the lower back and buttock region. It is associated with a  shooting, electric-like sensation that extends to the buttock, lateral thigh, lateral calf, and big toe, predominantly on the left side(Couple time a week)). The pain is exacerbated by sitting and transitioning from a sitting to a standing position, while movement and walking provide relief.  Denies numbness, tingling, weakness  Red Flags: The patient denies bowel or bladder incontinence, parasthesias, weakness, saddle anesthesia, unintentional weight loss, or fever/chills/sweats.       PAIN MEDICATIONS:  - Methocarbamol 500 QID PRN  - Tylenol 500-1000 QID PRN\Ibuprofen PRN  - Amitriptyline 50 mg at bedtime  - Gabapentin 600-600-900    INJECTIONS/SURGICAL HISTORY:   Lumbar RFA 9/13/2022 - Dr. Montez & 9/2/2021 - Dr. Miller  Left L4-5 transforaminal epidural steroid injection 5/27/2022  Right SI joint injection 12/2/2021 - Dr. Nickerson    IMAGING:  MRI LUMBAR SPINE 5/1/2021:   FINDINGS: For numbering purposes, L1 is considered to have small ribs,  normal variant. Alignment is significant for subtle grade  1  retrolisthesis of L4 on L5 and mild grade 1 anterolisthesis of L5 on  S1. Bone marrow demonstrates mild lower lumbar spine degenerative  endplate change. In addition, edema is present surrounding the  bilateral L5-S1 facet joints. Conus medullaris is unremarkable  terminating at the level of the L1-2 disc.     Multiple nonspecific T2 hyperintense renal lesions which are  incompletely characterized, statistically likely benign cysts. Tiny  area of T2 hyperintense signal within the right liver which is  incompletely characterized, statistically likely benign.     Segmental Analysis:      T12-L1:  Disc height maintained. No herniation. Normal facet joints.  No foraminal or spinal canal stenosis.      L1-L2:  Disc height maintained. No herniation. Normal facet joints. No  foraminal or spinal canal stenosis.       L2-L3:  Minimal disc height loss. No herniation. Normal facet joints.  No foraminal or spinal canal stenosis.       L3-L4:  Disc height maintained. No herniation. Normal facet joints. No  foraminal or spinal canal stenosis.       L4-L5:  Disc height maintained. No herniation. Mild bilateral facet  arthropathy. No foraminal or spinal canal stenosis.       L5-S1:  Mild disc height loss. Disc bulge with uncovering. Severe  bilateral facet arthropathy with marrow edema suggestive of active  arthropathy. Multiple synovial cysts are present projecting off the  facet joints, including a 6 mm synovial cyst projecting anteriorly off  the left facet joint which contributes to moderate-to-severe left  foraminal stenosis. Mild right foraminal stenosis. Mild spinal canal  stenosis.                                                                   IMPRESSION:  Severe bilateral facet arthropathy at L5-S1 with synovial  cysts, grade 1 anterolisthesis, and bilateral foraminal stenosis, left  worse than right, related to anteriorly projecting synovial cyst.      LABS:   HgbA1c - 6.4    Past Medical History:  Past Medical  History:   Diagnosis Date    Abnormal Papanicolaou smear of cervix and cervical HPV     colpo done    Arthritis     ASCUS on Pap smear 12/20/2006    + HPV    Cancer (H)     History of colposcopy with cervical biopsy 01/24/2007    Atypical squamous metaplasia    Thyroid disease 1999       Past Surgical History:  Past Surgical History:   Procedure Laterality Date    ABDOMEN SURGERY      BIOPSY      COLONOSCOPY N/A 12/11/2018    Procedure: COLONOSCOPY;  Surgeon: Vishnu Mandujano MD;  Location: [facility]    LAPAROSCOPIC CHOLECYSTECTOMY N/A 06/04/2015    Procedure: LAPAROSCOPIC CHOLECYSTECTOMY;  Surgeon: Maria Fernanda Regalado MD;  Location: [facility] OR    LAPAROSCOPIC HYSTERECTOMY TOTAL, BILATERAL SALPINGO-OOPHORECTOMY, COMBINED  08/14/2012    Procedure: COMBINED LAPAROSCOPIC HYSTERECTOMY TOTAL, SALPINGO-OOPHORECTOMY;  LAPAROSCOPIC HYSTERECTOMY TOTAL, Bilateral SALPINGO-OOPHORECTOMY, pelvic exam under anesthesia;  Surgeon: Jolene Baez MD;  Location: [facility] OR    ZZC LIGATE FALLOPIAN TUBE,POSTPARTUM  1998    Tubal Ligation    ZZC NONSPECIFIC PROCEDURE  1983    Right Ankle Surgery       Medications:  Current Outpatient Medications   Medication Sig Dispense Refill    acetaminophen (TYLENOL) 500 MG tablet Take 1-2 tablets (500-1,000 mg) by mouth every 6 hours as needed for pain. 90 tablet 0    ACETAMINOPHEN PO Take 325-750 mg by mouth every 6 hours as needed      amitriptyline (ELAVIL) 50 MG tablet Take 1 tablet (50 mg) by mouth at bedtime 90 tablet 1    diclofenac (VOLTAREN) 75 MG EC tablet Take 1 tablet (75 mg) by mouth 2 times daily. 28 tablet 0    Fexofenadine HCl (ALLEGRA ALLERGY PO)        gabapentin (NEURONTIN) 300 MG capsule Take 1 capsule (300 mg) by mouth at bedtime Take with 600 mg capsule 90 capsule 3    gabapentin (NEURONTIN) 600 MG tablet Take 1 tablet (600 mg) by mouth 3 times daily 270 tablet 3    ibuprofen (ADVIL/MOTRIN) 600 MG tablet Take 1 tablet (600 mg) by mouth every 6 hours as needed for mild pain. 90  tablet 0    levothyroxine (SYNTHROID/LEVOTHROID) 175 MCG tablet Take 2 tablets by mouth one day of the week and 1 tablet the rest of the days of the week 102 tablet 3    methocarbamol (ROBAXIN) 500 MG tablet TAKE ONE TABLET BY MOUTH FOUR TIMES DAILY AS NEEDED FOR MUSCLE SPASM 60 tablet 0    metoprolol succinate ER (TOPROL XL) 25 MG 24 hr tablet Take 1 tablet (25 mg) by mouth daily 90 tablet 3    varenicline (CHANTIX) 1 MG tablet Take 1 tablet (1 mg) by mouth 2 times daily. 60 tablet 2    venlafaxine (EFFEXOR-ER) 225 MG 24 hr tablet Take 1 tablet (225 mg) by mouth daily 90 tablet 3       Allergies:      Allergies   Allergen Reactions    Ibuprofen Itching    Monosodium Glutamate      MSG=sinus headache    Oxycodone        Family history:  Family History   Problem Relation Age of Onset    Breast Cancer Paternal Grandmother     Thyroid Disease Mother         Hypothyroid    Cancer Mother         Ovarian - age 61    Other Cancer Mother     Coronary Artery Disease Paternal Grandfather     Hypertension Father         Heart complication after MI.      Cancer Father         Throat (Epiglottis) smoker    Heart Disease Father          of heart attack    Coronary Artery Disease Father     Other Cancer Father     Breast Cancer Paternal Aunt         and P great aunt    Breast Cancer Paternal Aunt     Coronary Artery Disease Brother     Coronary Artery Disease Brother     Colon Cancer No family hx of        Physical Exam:  BP (!) 157/88   Pulse 96   LMP 2012 (Approximate)   SpO2 96%        Musculoskeletal exam:  Normal ROM in lumbar flexion, extension  Tender to palpation of the midline lumbar spine /lumbar paraspinals bilaterally, and B/L PSIS  Normal 5/5 strength in BLE   Normal sensation to light touch in the L3-S1 distributions bilaterally  No ankle clonus bilaterally      Scour: negative bilaterally   Straight leg raise: negative bilaterally     Reflexes   Patellar: L 2/4, R 2/4   Ankle:    L 2/4, R  2/4     B/L SI joint examination  CYNDY: Positive  Gaenslen's: Negative  Iliac SI distraction: Positive  Thigh thrust: Positive  Sidelying SI compression: Positive  Yeoman: Positive    Total: 5/6       ASSESSMENT/PLAN:  The following recommendations were given to the patient. Diagnosis, treatment options, risks, benefits, and alternatives were discussed, and all questions were answered. The patient expressed understanding of the plan for management.     The patient is a 59-year-old female with PMH of with a past medical history of thyroid disease & arthritis presenting with low back pain.     1. Sacroiliitis (Primary)  She describes the pain as achy, originating from the midline and radiating across the lower back and buttock region. It is associated with a infrequent shooting, electric-like sensation that extends to the buttock, lateral thigh, lateral calf, and big toe, predominantly on the left side. The pain is exacerbated by sitting and transitioning from a sitting to a standing position, while movement and walking provide relief.    The patient has been managing her pain with methocarbamol, Tylenol, amitriptyline, and gabapentin. She has completed a course of physical therapy and continues with her home exercise program. Past interventions include lumbar RFA, a left L4-L5 transforaminal epidural steroid injection, and a right SI joint injection.    On examination, 5/6 SI joint provocative tests were positive bilaterally. An MRI revealed severe bilateral facet arthropathy at L5-S1,L>R. The patient s symptoms are believed to be multifactorial, with SI joint dysfunction contributing to the low back pain and radiculopathy accounting for the shooting pain.    Let her know she does not need a  for SI joint injections  HgbA1c elevated.  Discussed close monitoring of glucose and limited carbs/sugar day of injection.     - PAIN INJECTION EVAL/TREAT/FOLLOW UP    2. Lumbar radiculopathy    - Pain Management   Referral      MEDICATIONS:     No orders of the defined types were placed in this encounter.         - Continue other medications without change           FOLLOW UP: for injections    Katina Louis MD (pain fellow)     I, Snehal Warren MD, was present with the pain fellow who participated in this patients care and in the documentation of the note.  I have verified the history, physical exam, and the content of the note which accurately represents the visit. I participated in the medical decision making which accurately reflects the assessment & the plan of care as documented in the note.         BILLING TIME DOCUMENTATION:   The total TIME spent on this patient on the date of the encounter/appointment was 40 minutes.      TOTAL TIME includes:   Time spent preparing to see the patient (reviewing records and tests) - 4 min  Time spent face to face (or over the phone) with the patient - 26 min  Time spent ordering tests, medications, procedures and referrals - 2 min  Time spent Referring and communicating with other healthcare professionals - 0 min  Time spent documenting clinical information in Epic - 8 min       SNEHAL WARREN MD   Pain Management

## 2025-01-23 ENCOUNTER — OFFICE VISIT (OUTPATIENT)
Dept: PALLIATIVE MEDICINE | Facility: CLINIC | Age: 60
End: 2025-01-23
Attending: FAMILY MEDICINE
Payer: COMMERCIAL

## 2025-01-23 VITALS — DIASTOLIC BLOOD PRESSURE: 88 MMHG | SYSTOLIC BLOOD PRESSURE: 157 MMHG | OXYGEN SATURATION: 96 % | HEART RATE: 96 BPM

## 2025-01-23 DIAGNOSIS — M46.1 SACROILIITIS: Primary | ICD-10-CM

## 2025-01-23 DIAGNOSIS — M54.16 LUMBAR RADICULOPATHY: ICD-10-CM

## 2025-01-23 ASSESSMENT — PAIN SCALES - PAIN ENJOYMENT GENERAL ACTIVITY SCALE (PEG)
INTERFERED_ENJOYMENT_LIFE: 10
PEG_TOTALSCORE: 10
INTERFERED_GENERAL_ACTIVITY: 10
AVG_PAIN_PASTWEEK: 10
INTERFERED_GENERAL_ACTIVITY: 10 - COMPLETELY INTERFERES
AVG_PAIN_PASTWEEK: 10 - PAIN AS BAD AS YOU CAN IMAGINE
INTERFERED_ENJOYMENT_LIFE: 10 - COMPLETELY INTERFERES
PEG_TOTALSCORE: 10

## 2025-01-23 ASSESSMENT — PAIN SCALES - GENERAL: PAINLEVEL_OUTOF10: MODERATE PAIN (5)

## 2025-01-23 NOTE — PATIENT INSTRUCTIONS
I ordered bilateral SI joint injections.  You will get a phone call to schedule these once we have a prior authorization from your insurance company.    You do not need a      Snehal Nickerson MD     ----------------------------------------------------------------  [facility] Number:  [phone]   Call with any questions about your care and for scheduling assistance.   Calls are returned Monday through Friday between 8 AM and 4:30 PM. We usually get back to you within 2 business days depending on the issue/request.    If we are prescribing your medications:  For opioid medication refills, call the clinic or send a [employer] message 7 days in advance.  Please include:  Name of requested medication  Name of the pharmacy.  For non-opioid medications, call your pharmacy directly to request a refill. Please allow 3-4 days to be processed.   Per [place] State Law:  All controlled substance prescriptions must be filled within 30 days of being written.    For those controlled substances allowing refills, pickup must occur within 30 days of last fill.      We believe regular attendance is key to your success in our program!    Any time you are unable to keep your appointment we ask that you call us at least 24 hours in advance to cancel.This will allow us to offer the appointment time to another patient.   Multiple missed appointments may lead to dismissal from the clinic.

## 2025-03-17 ENCOUNTER — VIRTUAL VISIT (OUTPATIENT)
Dept: INTERNAL MEDICINE | Facility: CLINIC | Age: 60
End: 2025-03-17
Payer: COMMERCIAL

## 2025-03-17 DIAGNOSIS — J01.90 ACUTE NON-RECURRENT SINUSITIS, UNSPECIFIED LOCATION: Primary | ICD-10-CM

## 2025-03-17 PROCEDURE — 98005 SYNCH AUDIO-VIDEO EST LOW 20: CPT

## 2025-03-17 NOTE — PROGRESS NOTES
"Josiah is a 59 year old who is being evaluated via a billable video visit.    How would you like to obtain your AVS? MyChart  If the video visit is dropped, the invitation should be resent by: Text to cell phone: [phone]  Will anyone else be joining your video visit? No      Assessment & Plan     (J01.90) Acute non-recurrent sinusitis, unspecified location  (primary encounter diagnosis)  Comment: Pt is followed by Dr. Dozier. Presents today for acute concerns for sinusitis.  Symptoms of sinusitis x 2 weeks without improvement in symptoms.  Endorses sinus pressure/tenderness.  Has tried robitussin, mucinex, nasal lavage, antihistamine, without much relief in symptoms.   No fevers, chills, cough, wheezing swelling around the eyes, vision changes, stiff neck.  Congestion is very mild- nasal lavage helps.   Does have history of allergies for which she takes Allegra for.  Plan: amoxicillin-clavulanate (AUGMENTIN) 875-125 MG         tablet            BMI  Estimated body mass index is 37.86 kg/m  as calculated from the following:    Height as of 12/20/24: 1.638 m (5' 4.5\").    Weight as of 12/20/24: 101.6 kg (224 lb).         Subjective   Josiah is a 59 year old, presenting for the following health issues:  Sinusitis        3/17/2025     8:49 AM   Additional Questions   Roomed by Conchita     Sinusitis    History of Present Illness       Reason for visit:  Sinus headache on right side for two plus weeks.    She eats 2-3 servings of fruits and vegetables daily.She consumes 0 sweetened beverage(s) daily.She exercises with enough effort to increase her heart rate 10 to 19 minutes per day.  She exercises with enough effort to increase her heart rate 3 or less days per week.   She is taking medications regularly.              Objective           Vitals:  No vitals were obtained today due to virtual visit.    Physical Exam   GENERAL: alert and no distress  EYES: Eyes grossly normal to inspection.  No discharge or erythema, or " obvious scleral/conjunctival abnormalities.  RESP: No audible wheeze, cough, or visible cyanosis.    SKIN: Visible skin clear. No significant rash, abnormal pigmentation or lesions.  NEURO: Cranial nerves grossly intact.  Mentation and speech appropriate for age.  PSYCH: Appropriate affect, tone, and pace of words          Video-Visit Details    Type of service:  Video Visit   Originating Location (pt. Location): Home    Distant Location (provider location):  On-site  Platform used for Video Visit: Tabitha  Signed Electronically by: ERNESTINA Devries CNP

## 2025-04-11 PROBLEM — S83.241A ACUTE MEDIAL MENISCUS TEAR OF RIGHT KNEE, INITIAL ENCOUNTER: Status: RESOLVED | Noted: 2024-11-12 | Resolved: 2025-04-11

## 2025-04-11 PROBLEM — Z98.890 S/P RIGHT KNEE ARTHROSCOPY: Status: RESOLVED | Noted: 2024-11-12 | Resolved: 2025-04-11

## 2025-04-21 ENCOUNTER — OFFICE VISIT (OUTPATIENT)
Dept: ORTHOPEDICS | Facility: CLINIC | Age: 60
End: 2025-04-21
Payer: COMMERCIAL

## 2025-04-21 ENCOUNTER — ANCILLARY PROCEDURE (OUTPATIENT)
Dept: GENERAL RADIOLOGY | Facility: CLINIC | Age: 60
End: 2025-04-21
Attending: STUDENT IN AN ORGANIZED HEALTH CARE EDUCATION/TRAINING PROGRAM
Payer: COMMERCIAL

## 2025-04-21 DIAGNOSIS — Z98.890 S/P RIGHT KNEE ARTHROSCOPY: Primary | ICD-10-CM

## 2025-04-21 DIAGNOSIS — Z98.890 S/P RIGHT KNEE ARTHROSCOPY: ICD-10-CM

## 2025-04-21 PROCEDURE — 73564 X-RAY EXAM KNEE 4 OR MORE: CPT | Mod: TC | Performed by: RADIOLOGY

## 2025-04-21 PROCEDURE — 99213 OFFICE O/P EST LOW 20 MIN: CPT | Performed by: STUDENT IN AN ORGANIZED HEALTH CARE EDUCATION/TRAINING PROGRAM

## 2025-04-21 ASSESSMENT — KOOS JR
STRAIGHTENING KNEE FULLY: SEVERE
RISING FROM SITTING: MILD
GOING UP OR DOWN STAIRS: MODERATE
TWISING OR PIVOTING ON KNEE: MODERATE
HOW SEVERE IS YOUR KNEE STIFFNESS AFTER FIRST WAKING IN MORNING: MODERATE
KOOS JR SCORING: 52.47
BENDING TO THE FLOOR TO PICK UP OBJECT: MODERATE
STANDING UPRIGHT: MODERATE

## 2025-04-21 NOTE — LETTER
2025      Arabella Hart  [address]  [place] [address]      Dear Colleague,    Thank you for referring your patient, Arabella Hart, to the [facility] [place]. Please see a copy of my visit note below.    CC: 5-month status post right knee arthroscopic partial medial meniscectomy and ACL debridement    HPI: Patient is a 59-year-old female seen here today now 5-month status post right knee arthroscopic partial medial meniscectomy and ACL debridement.  She did very well after surgery.  She returned to her job as well as all her day-to-day activities without pain.  Last week she began noticing some pinching pain and achiness on the medial side of her knee.  She does not recall a specific event.  She does not recall an injury.  She notices during work.  She is not having pain at rest or at night.  She is taken Tylenol and Aleve as needed for the pain.  She has not had any locking or mechanical symptoms.  She does note feelings of instability in her knee.    Objective:   PE:  RLE: Well-healed surgical incisions about the right knee.  Trace effusion.  No pain to palpation over the lateral joint line.  Patellar grind negative for pain or crepitus.  Pain to palpation over the medial joint line.  Passive range of motion is 0 to 150 degrees knee flexion.  Active range of motion is: To passive range of motion.  Grade 1 laxity to varus stress.  No laxity to valgus stress.  Stable anterior posterior drawer    Imagin view x-rays right knee from today are was reviewed.  This shows approximately 50% narrowing of the medial joint space.  Small osteophyte formation off the tibial plateau.  Well-maintained lateral patellofemoral joint space.     A/P:  Patient is a 59-year-old female seen here today with right knee medial sided pain and subjective feelings of instability.  She is now 5 months status post ACL debridement and partial medial meniscectomy.  X-rays were reviewed  today.  This shows approximately 50% joint space narrowing in the medial compartment.  I discussed with her today that I think the majority of her symptoms are from her mild medial compartment arthritis.  I do not suspect that she is sustained a new injury.  Discussed treatment options.  Discussed nonoperative management the form of oral anti-inflammatory medication, activity modification, the role of physical therapy, and the role of a medial  brace.  I definitely answer questions.  This time she would like to try medial  brace.  Think that be very reasonable for her instability and pain.  I will order this for her.  She can follow-up with me as needed anytime in the future.    Manav Calderón MD    [facility]   Department of Orthopedic Surgery      Disclaimer: This note consists of symbols derived from keyboarding, dictation and/or voice recognition software. As a result, there may be errors in the script that have gone undetected. Please consider this when interpreting information found in this chart.         Again, thank you for allowing me to participate in the care of your patient.        Sincerely,        Manav Calderón MD    Electronically signed

## 2025-04-21 NOTE — PATIENT INSTRUCTIONS
[facility]   [id number] [place] [address], [facility] [address]  [place] [address] [address] [place] [address]   Appointments: [phone] Appointments: [phone]   Fax: [phone] Fax: [phone]       1. S/P right knee arthroscopy        PHYSICAL THERAPY:  Physical Therapy orders have been placed with [facility].  You can call [phone] to schedule at your convenience.         Call my office with any questions or concerns, [phone].

## 2025-04-21 NOTE — PROGRESS NOTES
CC: 5-month status post right knee arthroscopic partial medial meniscectomy and ACL debridement    HPI: Patient is a 59-year-old female seen here today now 5-month status post right knee arthroscopic partial medial meniscectomy and ACL debridement.  She did very well after surgery.  She returned to her job as well as all her day-to-day activities without pain.  Last week she began noticing some pinching pain and achiness on the medial side of her knee.  She does not recall a specific event.  She does not recall an injury.  She notices during work.  She is not having pain at rest or at night.  She is taken Tylenol and Aleve as needed for the pain.  She has not had any locking or mechanical symptoms.  She does note feelings of instability in her knee.    Objective:   PE:  RLE: Well-healed surgical incisions about the right knee.  Trace effusion.  No pain to palpation over the lateral joint line.  Patellar grind negative for pain or crepitus.  Pain to palpation over the medial joint line.  Passive range of motion is 0 to 150 degrees knee flexion.  Active range of motion is: To passive range of motion.  Grade 1 laxity to varus stress.  No laxity to valgus stress.  Stable anterior posterior drawer    Imagin view x-rays right knee from today are was reviewed.  This shows approximately 50% narrowing of the medial joint space.  Small osteophyte formation off the tibial plateau.  Well-maintained lateral patellofemoral joint space.     A/P:  Patient is a 59-year-old female seen here today with right knee medial sided pain and subjective feelings of instability.  She is now 5 months status post ACL debridement and partial medial meniscectomy.  X-rays were reviewed today.  This shows approximately 50% joint space narrowing in the medial compartment.  I discussed with her today that I think the majority of her symptoms are from her mild medial compartment arthritis.  I do not suspect that she is sustained a new injury.   Discussed treatment options.  Discussed nonoperative management the form of oral anti-inflammatory medication, activity modification, the role of physical therapy, and the role of a medial  brace.  I definitely answer questions.  This time she would like to try medial  brace.  Think that be very reasonable for her instability and pain.  I will order this for her.  She can follow-up with me as needed anytime in the future.    Manav Calderón MD    [facility]   Department of Orthopedic Surgery      Disclaimer: This note consists of symbols derived from keyboarding, dictation and/or voice recognition software. As a result, there may be errors in the script that have gone undetected. Please consider this when interpreting information found in this chart.

## 2025-06-11 ENCOUNTER — MYC MEDICAL ADVICE (OUTPATIENT)
Dept: ORTHOPEDICS | Facility: CLINIC | Age: 60
End: 2025-06-11
Payer: COMMERCIAL

## 2025-06-16 ENCOUNTER — TELEPHONE (OUTPATIENT)
Dept: FAMILY MEDICINE | Facility: CLINIC | Age: 60
End: 2025-06-16
Payer: COMMERCIAL

## 2025-06-16 NOTE — TELEPHONE ENCOUNTER
Patient Quality Outreach    Patient is due for the following:   Breast Cancer Screening - Mammogram    Action(s) Taken:   Schedule a Adult Preventative    Type of outreach:    Sent [employer] message.    Questions for provider review:    None         Saeid Barrios CMA  Chart routed to None.

## 2025-06-19 DIAGNOSIS — M54.30 SCIATIC LEG PAIN: ICD-10-CM

## 2025-06-19 DIAGNOSIS — R00.0 SINUS TACHYCARDIA: ICD-10-CM

## 2025-06-19 DIAGNOSIS — M47.816 SPONDYLOSIS OF LUMBAR REGION WITHOUT MYELOPATHY OR RADICULOPATHY: ICD-10-CM

## 2025-06-19 RX ORDER — METOPROLOL SUCCINATE 25 MG/1
25 TABLET, EXTENDED RELEASE ORAL DAILY
Qty: 90 TABLET | Refills: 2 | Status: SHIPPED | OUTPATIENT
Start: 2025-06-19

## 2025-06-19 RX ORDER — AMITRIPTYLINE HYDROCHLORIDE 50 MG/1
50 TABLET ORAL
Qty: 90 TABLET | Refills: 2 | Status: SHIPPED | OUTPATIENT
Start: 2025-06-19

## 2025-07-17 DIAGNOSIS — F17.200 TOBACCO USE DISORDER: ICD-10-CM

## 2025-07-17 RX ORDER — VARENICLINE TARTRATE 1 MG/1
1 TABLET, FILM COATED ORAL 2 TIMES DAILY
Qty: 60 TABLET | Refills: 0 | Status: SHIPPED | OUTPATIENT
Start: 2025-07-17

## 2025-07-17 NOTE — TELEPHONE ENCOUNTER
Clinic RN: Please investigate patient's chart or contact patient if the information cannot be found because patient should have run out of this medication on 2/2025. Confirm patient is taking this medication as prescribed. Document findings and route refill encounter to provider for approval or denial.    Leatha Gonsalez, RN on 7/17/2025 at 11:17 AM

## 2025-08-16 ENCOUNTER — HEALTH MAINTENANCE LETTER (OUTPATIENT)
Age: 60
End: 2025-08-16

## 2025-08-18 ENCOUNTER — MYC MEDICAL ADVICE (OUTPATIENT)
Dept: ORTHOPEDICS | Facility: CLINIC | Age: 60
End: 2025-08-18

## 2025-08-18 ENCOUNTER — OFFICE VISIT (OUTPATIENT)
Dept: ORTHOPEDICS | Facility: CLINIC | Age: 60
End: 2025-08-18
Payer: COMMERCIAL

## 2025-08-18 DIAGNOSIS — Z98.890 S/P RIGHT KNEE ARTHROSCOPY: Primary | ICD-10-CM

## 2025-08-18 PROCEDURE — 99213 OFFICE O/P EST LOW 20 MIN: CPT | Mod: 25 | Performed by: STUDENT IN AN ORGANIZED HEALTH CARE EDUCATION/TRAINING PROGRAM

## 2025-08-18 PROCEDURE — 20610 DRAIN/INJ JOINT/BURSA W/O US: CPT | Mod: RT | Performed by: STUDENT IN AN ORGANIZED HEALTH CARE EDUCATION/TRAINING PROGRAM

## 2025-08-18 RX ORDER — TRIAMCINOLONE ACETONIDE 40 MG/ML
40 INJECTION, SUSPENSION INTRA-ARTICULAR; INTRAMUSCULAR
Status: COMPLETED | OUTPATIENT
Start: 2025-08-18 | End: 2025-08-18

## 2025-08-18 RX ORDER — LIDOCAINE HYDROCHLORIDE 10 MG/ML
7 INJECTION, SOLUTION INFILTRATION; PERINEURAL
Status: COMPLETED | OUTPATIENT
Start: 2025-08-18 | End: 2025-08-18

## 2025-08-18 RX ADMIN — TRIAMCINOLONE ACETONIDE 40 MG: 40 INJECTION, SUSPENSION INTRA-ARTICULAR; INTRAMUSCULAR at 10:00

## 2025-08-18 RX ADMIN — LIDOCAINE HYDROCHLORIDE 7 ML: 10 INJECTION, SOLUTION INFILTRATION; PERINEURAL at 10:00

## 2025-08-21 ENCOUNTER — TELEPHONE (OUTPATIENT)
Dept: FAMILY MEDICINE | Facility: CLINIC | Age: 60
End: 2025-08-21
Payer: COMMERCIAL

## (undated) DEVICE — KIT ENDO TURNOVER/PROCEDURE W/CLEAN A SCOPE LINERS 103888

## (undated) RX ORDER — FENTANYL CITRATE 50 UG/ML
INJECTION, SOLUTION INTRAMUSCULAR; INTRAVENOUS
Status: DISPENSED
Start: 2018-12-11